# Patient Record
Sex: FEMALE | Race: WHITE | Employment: OTHER | URBAN - METROPOLITAN AREA
[De-identification: names, ages, dates, MRNs, and addresses within clinical notes are randomized per-mention and may not be internally consistent; named-entity substitution may affect disease eponyms.]

---

## 2017-01-09 ENCOUNTER — TELEPHONE (OUTPATIENT)
Dept: NEUROLOGY | Facility: CLINIC | Age: 62
End: 2017-01-09

## 2017-01-09 DIAGNOSIS — I63.9 PERSISTENT MIGRAINE AURA WITH CEREBRAL INFARCTION AND WITHOUT STATUS MIGRAINOSUS, NOT INTRACTABLE (HCC): Primary | ICD-10-CM

## 2017-01-09 DIAGNOSIS — G43.609 PERSISTENT MIGRAINE AURA WITH CEREBRAL INFARCTION AND WITHOUT STATUS MIGRAINOSUS, NOT INTRACTABLE (HCC): Primary | ICD-10-CM

## 2017-01-18 NOTE — TELEPHONE ENCOUNTER
Spoke to Brionna Harmon at [facility], codes [id number] are valid and billable, no prior authorization or predetermination required.  Call reference: [id number]  Call time 24:12

## 2017-02-20 ENCOUNTER — OFFICE VISIT (OUTPATIENT)
Dept: SURGERY | Facility: CLINIC | Age: 62
End: 2017-02-20

## 2017-02-20 VITALS
HEART RATE: 88 BPM | SYSTOLIC BLOOD PRESSURE: 140 MMHG | BODY MASS INDEX: 28 KG/M2 | DIASTOLIC BLOOD PRESSURE: 90 MMHG | WEIGHT: 177 LBS | RESPIRATION RATE: 20 BRPM

## 2017-02-20 DIAGNOSIS — G43.719 INTRACTABLE CHRONIC MIGRAINE WITHOUT AURA AND WITHOUT STATUS MIGRAINOSUS: ICD-10-CM

## 2017-02-20 DIAGNOSIS — G43.711 INTRACTABLE CHRONIC MIGRAINE WITHOUT AURA AND WITH STATUS MIGRAINOSUS: ICD-10-CM

## 2017-02-20 DIAGNOSIS — M54.81 CERVICO-OCCIPITAL NEURALGIA: ICD-10-CM

## 2017-02-20 DIAGNOSIS — I63.9 PERSISTENT MIGRAINE AURA WITH CEREBRAL INFARCTION AND WITHOUT STATUS MIGRAINOSUS, NOT INTRACTABLE (HCC): ICD-10-CM

## 2017-02-20 DIAGNOSIS — G43.519 MIGRAINE AURA, PERSISTENT, INTRACTABLE: Primary | ICD-10-CM

## 2017-02-20 DIAGNOSIS — G50.1 ATYPICAL FACIAL PAIN: ICD-10-CM

## 2017-02-20 DIAGNOSIS — G43.011 INTRACTABLE MIGRAINE WITHOUT AURA AND WITH STATUS MIGRAINOSUS: ICD-10-CM

## 2017-02-20 DIAGNOSIS — G43.609 PERSISTENT MIGRAINE AURA WITH CEREBRAL INFARCTION AND WITHOUT STATUS MIGRAINOSUS, NOT INTRACTABLE (HCC): ICD-10-CM

## 2017-02-20 DIAGNOSIS — G43.701 CHRONIC MIGRAINE WITHOUT AURA WITH STATUS MIGRAINOSUS, NOT INTRACTABLE: ICD-10-CM

## 2017-02-20 PROCEDURE — 20553 NJX 1/MLT TRIGGER POINTS 3/>: CPT | Performed by: OTHER

## 2017-02-20 PROCEDURE — 99213 OFFICE O/P EST LOW 20 MIN: CPT | Performed by: OTHER

## 2017-02-20 PROCEDURE — 96372 THER/PROPH/DIAG INJ SC/IM: CPT | Performed by: OTHER

## 2017-02-20 RX ORDER — NARATRIPTAN 2.5 MG/1
TABLET ORAL
Qty: 10 TABLET | Refills: 4 | Status: SHIPPED | OUTPATIENT
Start: 2017-02-20 | End: 2017-03-08

## 2017-02-20 RX ORDER — AMITRIPTYLINE HYDROCHLORIDE 25 MG/1
TABLET, FILM COATED ORAL
Qty: 30 TABLET | Refills: 4 | Status: SHIPPED | OUTPATIENT
Start: 2017-02-20 | End: 2017-02-24

## 2017-02-20 RX ORDER — BUPIVACAINE HYDROCHLORIDE 5 MG/ML
7 INJECTION, SOLUTION EPIDURAL; INTRACAUDAL ONCE
Status: COMPLETED | OUTPATIENT
Start: 2017-02-20 | End: 2017-02-20

## 2017-02-20 NOTE — PATIENT INSTRUCTIONS
Refill policies:    • Allow 2 business days for refills; controlled substances may take longer.   • Contact your pharmacy at least 5 days prior to running out of medication and have them send an electronic request or submit request through the “request re your physician has recommended that you have a procedure or additional testing performed. [facility]) will contact your insurance carrier to obtain pre-certification or prior authorization.     Unfortunately, [employer] has seen an increas

## 2017-02-20 NOTE — PROGRESS NOTES
SUBJECTIVE: Dayami Maloney is a 63year old female who presents for follow-up of migraine headaches, Says also pain in her left trapezius and left shoulder blade muscles. Requesting trigger point injections in neck today says occipital headaches better. Visit:  Signed Prescriptions  Disp  Refills     Amitriptyline HCl 25 MG Oral Tab  30 tablet  4       Sig: Take 1 tab at 7.00pm         Naratriptan HCl 2.5 MG Oral Tab  10 tablet  4       Sig: TAKE 1 TABLET BY MOUTH AT ONSET OF HEADACHE, MAY REPEAT IN 4 ROSALIE

## 2017-02-24 NOTE — PROGRESS NOTES
Quick Note:    Results reviewed and discussed with the patient at her appointment earlier today, with the only significant abnormalities being a decreased vitamin D level of 21.5, a decreased alkaline phosphatase on CMP, and an elevated folic acid level wh

## 2017-02-28 ENCOUNTER — TELEPHONE (OUTPATIENT)
Dept: NEUROLOGY | Facility: CLINIC | Age: 62
End: 2017-02-28

## 2017-02-28 NOTE — TELEPHONE ENCOUNTER
Rec'd incoming letter from Clinical Review Department from [employer] stating that the patient's Naratriptan 2.5mg is subject to a quantity limit of 18 tablets per 30 days. Noted that last Rx written on 2/20/17 was for #10/4.   No further action ne

## 2017-03-01 ENCOUNTER — OFFICE VISIT (OUTPATIENT)
Dept: FAMILY MEDICINE CLINIC | Facility: CLINIC | Age: 62
End: 2017-03-01

## 2017-03-01 VITALS
TEMPERATURE: 98 F | HEART RATE: 90 BPM | BODY MASS INDEX: 28 KG/M2 | OXYGEN SATURATION: 98 % | RESPIRATION RATE: 18 BRPM | DIASTOLIC BLOOD PRESSURE: 70 MMHG | SYSTOLIC BLOOD PRESSURE: 118 MMHG | WEIGHT: 173.25 LBS

## 2017-03-01 DIAGNOSIS — E55.9 VITAMIN D DEFICIENCY: Primary | ICD-10-CM

## 2017-03-01 DIAGNOSIS — E53.8 VITAMIN B12 DEFICIENCY: ICD-10-CM

## 2017-03-01 DIAGNOSIS — J45.20 ALLERGY-INDUCED ASTHMA, MILD INTERMITTENT, UNCOMPLICATED: ICD-10-CM

## 2017-03-01 PROCEDURE — 99213 OFFICE O/P EST LOW 20 MIN: CPT | Performed by: FAMILY MEDICINE

## 2017-03-01 RX ORDER — ALBUTEROL SULFATE 90 UG/1
2 AEROSOL, METERED RESPIRATORY (INHALATION) EVERY 6 HOURS PRN
Qty: 1 INHALER | Refills: 2 | Status: SHIPPED | OUTPATIENT
Start: 2017-03-01 | End: 2017-03-31

## 2017-03-06 ENCOUNTER — TELEPHONE (OUTPATIENT)
Dept: SURGERY | Facility: CLINIC | Age: 62
End: 2017-03-06

## 2017-03-06 DIAGNOSIS — G43.719 INTRACTABLE CHRONIC MIGRAINE WITHOUT AURA AND WITHOUT STATUS MIGRAINOSUS: ICD-10-CM

## 2017-03-06 DIAGNOSIS — I63.9 PERSISTENT MIGRAINE AURA WITH CEREBRAL INFARCTION AND WITHOUT STATUS MIGRAINOSUS, NOT INTRACTABLE (HCC): ICD-10-CM

## 2017-03-06 DIAGNOSIS — G43.609 PERSISTENT MIGRAINE AURA WITH CEREBRAL INFARCTION AND WITHOUT STATUS MIGRAINOSUS, NOT INTRACTABLE (HCC): ICD-10-CM

## 2017-03-06 DIAGNOSIS — G43.711 INTRACTABLE CHRONIC MIGRAINE WITHOUT AURA AND WITH STATUS MIGRAINOSUS: ICD-10-CM

## 2017-03-06 DIAGNOSIS — M54.81 CERVICO-OCCIPITAL NEURALGIA: ICD-10-CM

## 2017-03-06 DIAGNOSIS — G50.1 ATYPICAL FACIAL PAIN: ICD-10-CM

## 2017-03-06 DIAGNOSIS — G43.701 CHRONIC MIGRAINE WITHOUT AURA WITH STATUS MIGRAINOSUS, NOT INTRACTABLE: ICD-10-CM

## 2017-03-06 DIAGNOSIS — G43.111 INTRACTABLE MIGRAINE WITH AURA WITH STATUS MIGRAINOSUS: Primary | ICD-10-CM

## 2017-03-06 DIAGNOSIS — G43.011 INTRACTABLE MIGRAINE WITHOUT AURA AND WITH STATUS MIGRAINOSUS: ICD-10-CM

## 2017-03-06 DIAGNOSIS — G43.519 MIGRAINE AURA, PERSISTENT, INTRACTABLE: ICD-10-CM

## 2017-03-06 RX ORDER — ERGOCALCIFEROL 1.25 MG/1
50000 CAPSULE ORAL WEEKLY
Qty: 12 CAPSULE | Refills: 0 | Status: SHIPPED | OUTPATIENT
Start: 2017-03-06 | End: 2017-06-04

## 2017-03-06 NOTE — TELEPHONE ENCOUNTER
Patient saw Dr Savanah Lucio last week and states Dr Savanah Lucio was sending script for Vit D to pharmacy but pharmacy has no record. Transferred to triage line.

## 2017-03-06 NOTE — TELEPHONE ENCOUNTER
Vit D Rx?     Notes Recorded by Urbano Zurita MD on 2/24/2017 at 4:24 PM  Results reviewed and discussed with the patient at her appointment earlier today, with the only significant abnormalities being a decreased vitamin D level of 21.5, a decreased alkal

## 2017-03-06 NOTE — PROGRESS NOTES
Maegan Merino is a 63year old female. Patient presents with:  Test Results: Discuss test results. HPI:   Patient is seen for follow up and to discuss her test results.     Patient states has some cough and shortness of breath in spring and fall due t Albuterol Sulfate HFA (PROAIR HFA) 108 (90 BASE) MCG/ACT Inhalation Aero Soln Inhale 2 puffs into the lungs every 4 (four) hours as needed for Wheezing. Disp: 1 Inhaler Rfl: 0   ANGELIQ 0.5-1 MG Oral Tab Take 1 tablet by mouth daily.  Disp: 91 tablet Rfl: nails secondary to mechanical injury    • Acute pharyngitis 2/98   • Viral syndrome 10/99   • Abdominal cramping 12/01   • Chest pain 2/02     with exercises   • Rotator cuff tendinitis 12/04   • BRBPR (bright red blood per rectum) 3/06   • Fever blister 1 3.5-4.8 g/dL 3.9   Sodium      136-144 mmol/L 139   Potassium      3.6-5.1 mmol/L 4.4   Chloride      101-111 mmol/L 106   Carbon Dioxide, Total      22.0-32.0 mmol/L 27.0   T4,Free (Direct)      0.9-1.8 ng/dL 0.9   TSH      0.350-5.500 mIU/mL 1.140   LUCIANO

## 2017-03-07 NOTE — TELEPHONE ENCOUNTER
Patient called to let clinic know she got a letter stating that her naratriptan will not be approved after this fill without prior authorization. If authorized, they will approve up to 18 tabs/month. Has been using with success since 2013.     She has tr

## 2017-03-08 RX ORDER — NARATRIPTAN 2.5 MG/1
TABLET ORAL
Qty: 18 TABLET | Refills: 2 | Status: SHIPPED | OUTPATIENT
Start: 2017-03-08 | End: 2018-07-24

## 2017-03-09 RX ORDER — ERGOCALCIFEROL 1.25 MG/1
CAPSULE ORAL
Qty: 12 CAPSULE | Refills: 0 | OUTPATIENT
Start: 2017-03-09

## 2017-03-09 NOTE — TELEPHONE ENCOUNTER
Future Appointments  Date Time Provider Siddhartha Aleman   4/4/2017 1:30 PM Diana Mccurdy MD [facility]   4/17/2017 1:40 PM William Burton MD [facility]     LOV    LAST LAB    LAST RX    ergocalciferol 63260 units Oral Cap 12 cap

## 2017-03-14 ENCOUNTER — TELEPHONE (OUTPATIENT)
Dept: SURGERY | Facility: CLINIC | Age: 62
End: 2017-03-14

## 2017-03-14 RX ORDER — METHYLPREDNISOLONE 4 MG/1
TABLET ORAL
Qty: 21 TABLET | Refills: 0 | Status: SHIPPED | OUTPATIENT
Start: 2017-03-14 | End: 2017-05-01 | Stop reason: ALTCHOICE

## 2017-03-14 NOTE — TELEPHONE ENCOUNTER
Patient states has not been able to stop cycle of headaches since 3/10/17. Last night LOP 10/10. States \"I have been trying everything to stop\" the cycle. Requesting Medrol dose pack. See refill request of today.

## 2017-03-20 ENCOUNTER — TELEPHONE (OUTPATIENT)
Dept: NEUROLOGY | Facility: CLINIC | Age: 62
End: 2017-03-20

## 2017-03-20 DIAGNOSIS — G43.701 CHRONIC MIGRAINE WITHOUT AURA WITH STATUS MIGRAINOSUS, NOT INTRACTABLE: Primary | ICD-10-CM

## 2017-03-20 RX ORDER — NARATRIPTAN 2.5 MG/1
2.5 TABLET ORAL AS NEEDED
Qty: 18 TABLET | Refills: 2 | COMMUNITY
Start: 2017-03-20 | End: 2017-05-01

## 2017-03-20 NOTE — TELEPHONE ENCOUNTER
Rec'd incoming letter in the mail from [address] stating that AMERGE 2.5mg is not on their Medicare Part D drug list, and insurance will not continue to cover this drug unless you get an exception from the plan.     Noted that a previous letter (se

## 2017-03-21 NOTE — TELEPHONE ENCOUNTER
Rec'd incoming fax from [facility] with approval for AMERGE (brand name only) 2.5mg tablet as a formulary exception with coverage effective 1/1/17 to 3/21/18. Contacted pharmacy to notify them of approval of formulary exception.   The

## 2017-04-14 ENCOUNTER — TELEPHONE (OUTPATIENT)
Dept: NEUROLOGY | Facility: CLINIC | Age: 62
End: 2017-04-14

## 2017-04-14 NOTE — TELEPHONE ENCOUNTER
Called [employer] and spoke with foretta for codes [id number] no authorization or predetermination needed    Call reference [id number].  Time on call 6:18

## 2017-04-17 ENCOUNTER — TELEPHONE (OUTPATIENT)
Dept: NEUROLOGY | Facility: CLINIC | Age: 62
End: 2017-04-17

## 2017-04-18 ENCOUNTER — HOSPITAL ENCOUNTER (EMERGENCY)
Age: 62
Discharge: HOME | End: 2017-04-18
Attending: EMERGENCY MEDICINE
Payer: MEDICARE

## 2017-04-18 ENCOUNTER — TELEPHONE (OUTPATIENT)
Dept: FAMILY MEDICINE CLINIC | Facility: CLINIC | Age: 62
End: 2017-04-18

## 2017-04-18 VITALS
OXYGEN SATURATION: 99 % | BODY MASS INDEX: 28 KG/M2 | WEIGHT: 173 LBS | TEMPERATURE: 99 F | DIASTOLIC BLOOD PRESSURE: 89 MMHG | SYSTOLIC BLOOD PRESSURE: 148 MMHG | HEART RATE: 99 BPM | RESPIRATION RATE: 16 BRPM

## 2017-04-18 DIAGNOSIS — R11.2 NAUSEA AND VOMITING IN ADULT: Primary | ICD-10-CM

## 2017-04-18 PROCEDURE — 93010 ELECTROCARDIOGRAM REPORT: CPT

## 2017-04-18 PROCEDURE — 83690 ASSAY OF LIPASE: CPT | Performed by: EMERGENCY MEDICINE

## 2017-04-18 PROCEDURE — 96374 THER/PROPH/DIAG INJ IV PUSH: CPT

## 2017-04-18 PROCEDURE — 99284 EMERGENCY DEPT VISIT MOD MDM: CPT

## 2017-04-18 PROCEDURE — 93005 ELECTROCARDIOGRAM TRACING: CPT

## 2017-04-18 PROCEDURE — 85025 COMPLETE CBC W/AUTO DIFF WBC: CPT | Performed by: EMERGENCY MEDICINE

## 2017-04-18 PROCEDURE — 96376 TX/PRO/DX INJ SAME DRUG ADON: CPT

## 2017-04-18 PROCEDURE — 96361 HYDRATE IV INFUSION ADD-ON: CPT

## 2017-04-18 PROCEDURE — 80053 COMPREHEN METABOLIC PANEL: CPT | Performed by: EMERGENCY MEDICINE

## 2017-04-18 PROCEDURE — 99285 EMERGENCY DEPT VISIT HI MDM: CPT

## 2017-04-18 PROCEDURE — 84484 ASSAY OF TROPONIN QUANT: CPT | Performed by: EMERGENCY MEDICINE

## 2017-04-18 PROCEDURE — 96375 TX/PRO/DX INJ NEW DRUG ADDON: CPT

## 2017-04-18 RX ORDER — ONDANSETRON 2 MG/ML
4 INJECTION INTRAMUSCULAR; INTRAVENOUS ONCE
Status: COMPLETED | OUTPATIENT
Start: 2017-04-18 | End: 2017-04-18

## 2017-04-18 RX ORDER — METOCLOPRAMIDE HYDROCHLORIDE 5 MG/ML
10 INJECTION INTRAMUSCULAR; INTRAVENOUS ONCE
Status: COMPLETED | OUTPATIENT
Start: 2017-04-18 | End: 2017-04-18

## 2017-04-18 RX ORDER — LORAZEPAM 2 MG/ML
0.5 INJECTION INTRAMUSCULAR ONCE
Status: COMPLETED | OUTPATIENT
Start: 2017-04-18 | End: 2017-04-18

## 2017-04-18 RX ORDER — DIPHENHYDRAMINE HYDROCHLORIDE 50 MG/ML
25 INJECTION INTRAMUSCULAR; INTRAVENOUS ONCE
Status: COMPLETED | OUTPATIENT
Start: 2017-04-18 | End: 2017-04-18

## 2017-04-19 NOTE — TELEPHONE ENCOUNTER
Received page after hours from  that pt has had multiple episodes of nb,nb emesis throughout the day, unable to tolerate po. Tried zofran without any symptom relief. Has hx of migraines but never with vomiting.  Denies any new foods or known triggers

## 2017-04-19 NOTE — ED PROVIDER NOTES
Patient Seen in: THE [facility] In [place]    History   Patient presents with:  Nausea/Vomiting/Diarrhea (gastrointestinal)    Stated Complaint: \"vomiting all day\"     HPI  Patient is a 82-year-old female who states that she felt slight 12/01   • Chest pain 2/02     with exercises   • Rotator cuff tendinitis 12/04   • BRBPR (bright red blood per rectum) 3/06   • Fever blister 10/06      on lips   • Sinusitis 7/24/07   • Frequency 62/17   • Ureteral colic 76/35   • Postmenopausal HRT (horm Amitriptyline HCl 50 MG Oral Tab,  Take 1 tab at 7.00pm   diazepam 5 MG Oral Tab,  TAKE 1/2 TO 1 TABLET BY MOUTH EVERY NIGHT AT BEDTIME   alprazolam 0.25 MG Oral Tab,  TAKE 1 TO 2 TABLETS BY MOUTH TWICE DAILY AS NEEDED FOR SLEEP OR ANXIETY   Esomeprazole M negative except as noted above. PSFH elements reviewed from today and agreed except as otherwise stated in HPI.     Physical Exam       ED Triage Vitals   BP 04/18/17 1949 153/90 mmHg   Pulse 04/18/17 1949 100   Resp 04/18/17 1949 16   Temp 04/18/17 1949 Abnormal            Final result                 Please view results for these tests on the individual orders. EKG    Rate, intervals and axes as noted on EKG Report.   Rate: 101  Rhythm: Sinus Rhythm  Reading: Sinus tachycardia, nonspecific ST ch

## 2017-04-20 ENCOUNTER — OFFICE VISIT (OUTPATIENT)
Dept: FAMILY MEDICINE CLINIC | Facility: CLINIC | Age: 62
End: 2017-04-20

## 2017-04-20 ENCOUNTER — TELEPHONE (OUTPATIENT)
Dept: FAMILY MEDICINE CLINIC | Facility: CLINIC | Age: 62
End: 2017-04-20

## 2017-04-20 VITALS
SYSTOLIC BLOOD PRESSURE: 118 MMHG | RESPIRATION RATE: 16 BRPM | HEART RATE: 98 BPM | TEMPERATURE: 98 F | BODY MASS INDEX: 28 KG/M2 | DIASTOLIC BLOOD PRESSURE: 84 MMHG | WEIGHT: 174.5 LBS | OXYGEN SATURATION: 98 %

## 2017-04-20 DIAGNOSIS — K59.09 OTHER CONSTIPATION: ICD-10-CM

## 2017-04-20 DIAGNOSIS — J30.1 SEASONAL ALLERGIC RHINITIS DUE TO POLLEN: ICD-10-CM

## 2017-04-20 DIAGNOSIS — K21.9 GASTROESOPHAGEAL REFLUX DISEASE WITHOUT ESOPHAGITIS: ICD-10-CM

## 2017-04-20 DIAGNOSIS — K52.9 ACUTE GASTROENTERITIS: Primary | ICD-10-CM

## 2017-04-20 DIAGNOSIS — R07.89 CHEST TIGHTNESS: ICD-10-CM

## 2017-04-20 PROCEDURE — 99213 OFFICE O/P EST LOW 20 MIN: CPT | Performed by: FAMILY MEDICINE

## 2017-04-20 RX ORDER — RANITIDINE 150 MG/1
150 CAPSULE ORAL EVERY EVENING
Qty: 30 CAPSULE | Refills: 2 | Status: SHIPPED | OUTPATIENT
Start: 2017-04-20 | End: 2017-10-17

## 2017-04-20 NOTE — PATIENT INSTRUCTIONS
Please take Nexium in the morning and ranitidine 150 mg daily. Please call your GI and set up an appointment. Please continue claritin 10 mg over the counter once daily and your inhaler as needed. miralax for constipation.

## 2017-04-21 ENCOUNTER — TELEPHONE (OUTPATIENT)
Dept: FAMILY MEDICINE CLINIC | Facility: CLINIC | Age: 62
End: 2017-04-21

## 2017-04-21 RX ORDER — CEFDINIR 300 MG/1
300 CAPSULE ORAL 2 TIMES DAILY
Qty: 20 CAPSULE | Refills: 0 | Status: SHIPPED | OUTPATIENT
Start: 2017-04-21 | End: 2017-05-01

## 2017-04-21 RX ORDER — ESOMEPRAZOLE MAGNESIUM 40 MG/1
CAPSULE, DELAYED RELEASE ORAL
Qty: 30 CAPSULE | Refills: 2 | Status: CANCELLED | OUTPATIENT
Start: 2017-04-21

## 2017-04-21 NOTE — TELEPHONE ENCOUNTER
Per PCP patient is aware that she needs to pay for OTC Rx. . Patient says she will check with her GI.

## 2017-04-21 NOTE — TELEPHONE ENCOUNTER
Patient states she is feeling much worse than yesterday. She's wondering if she needs an antibiotic. Transferred to triage line.

## 2017-04-21 NOTE — TELEPHONE ENCOUNTER
Patient says her cough is worse she now has a sore throat and her ears are beng effected. Wants antibiotic.      Was here 4/20    Acute gastroenteritis  - Primary K52.9     Court Damian  Signed  Service date: 04/21/2017 2:00 PM           Patient states she i

## 2017-04-22 ENCOUNTER — OFFICE VISIT (OUTPATIENT)
Dept: FAMILY MEDICINE CLINIC | Facility: CLINIC | Age: 62
End: 2017-04-22

## 2017-04-22 VITALS
RESPIRATION RATE: 16 BRPM | OXYGEN SATURATION: 99 % | HEART RATE: 102 BPM | WEIGHT: 177 LBS | DIASTOLIC BLOOD PRESSURE: 84 MMHG | SYSTOLIC BLOOD PRESSURE: 130 MMHG | BODY MASS INDEX: 28 KG/M2 | TEMPERATURE: 98 F

## 2017-04-22 DIAGNOSIS — R05.9 COUGH: ICD-10-CM

## 2017-04-22 DIAGNOSIS — J01.00 ACUTE MAXILLARY SINUSITIS, RECURRENCE NOT SPECIFIED: Primary | ICD-10-CM

## 2017-04-22 PROCEDURE — 99213 OFFICE O/P EST LOW 20 MIN: CPT | Performed by: PHYSICIAN ASSISTANT

## 2017-04-22 RX ORDER — PREDNISONE 20 MG/1
40 TABLET ORAL DAILY
Qty: 10 TABLET | Refills: 0 | Status: SHIPPED | OUTPATIENT
Start: 2017-04-22 | End: 2017-04-27

## 2017-04-22 RX ORDER — BENZONATATE 100 MG/1
100 CAPSULE ORAL 3 TIMES DAILY PRN
Qty: 20 CAPSULE | Refills: 0 | Status: SHIPPED | OUTPATIENT
Start: 2017-04-22 | End: 2017-05-01 | Stop reason: ALTCHOICE

## 2017-04-22 NOTE — PROGRESS NOTES
Narciso Lewis is a 63year old female. Patient presents with:  Cough: no SOB, but feels that her chest is congested and hard for her to exhale fully  Sinus Problem    HPI:    Symptoms started 1 week ago and worsening.  Reports nasal congestion and m cefdinir 300 MG Oral Cap Take 1 capsule (300 mg total) by mouth 2 (two) times daily.  Disp: 20 capsule Rfl: 0   Esomeprazole Magnesium 20 MG Oral Capsule Delayed Release 2 tablets daily Disp: 60 capsule Rfl: 2   RaNITidine HCl 150 MG Oral Cap Take 1 capsule MULTIVITAMIN TAB/CAP Take 1 tablet by mouth daily.  Disp:  Rfl:    Acetaminophen (TYLENOL) 325 MG Oral Tab 2 TABLETS EVERY 4 HOURS AS NEEDED Disp:  Rfl:         ROS:   GENERAL HEALTH: otherwise feels well  EYES: no visual complaints or deficits  RESPIRATORY Sinuses are air-filled spaces in the skull behind the face. They are kept moist and clean by a lining of mucosa. Things such as pollen, smoke, and chemical fumes can irritate the mucosa. It can then swell up.  As a response to irritation, the mucosa makes m © 0074-1847 [address], [address] [place]. All rights reserved. This information is not intended as a substitute for professional medical care. Always follow your healthcare professional's instructions.               Montrell Davis

## 2017-04-22 NOTE — PATIENT INSTRUCTIONS
Acute Sinusitis    Acute sinusitis is irritation and swelling of the sinuses. It is usually caused by a viral infection after a common cold. Your doctor can help you find relief. What is acute sinusitis?   Sinuses are air-filled spaces in the skull behin © 7922-3829 [address], [address] [place]. All rights reserved. This information is not intended as a substitute for professional medical care. Always follow your healthcare professional's instructions.

## 2017-04-24 NOTE — PROGRESS NOTES
Tricia Pacheco is a 63year old female. Patient presents with:  Flu: Started on Tuesday with vomitting and now has moved up to respiratory. HPI:   Patient is seen for follow-up from urgent care.   Urgent care for 1817 with complaints of nausea and v Prescriptions:  Esomeprazole Magnesium 20 MG Oral Capsule Delayed Release 2 tablets daily Disp: 60 capsule Rfl: 2   Naratriptan HCl 2.5 MG Oral Tab TAKE 1 TABLET BY MOUTH AT ONSET OF HEADACHE, MAY REPEAT IN 4 HOURS( MAXIMUM 2 TABLETS EVERY 24 HOURS) Disp: MCG/ACT Nasal Suspension SPRAY TWICE IN EACH NOSTRIL QD Disp:  Rfl: 2   Diclofenac Sodium 50 MG Oral Tab EC Take 1 tablet (50 mg total) by mouth 2 (two) times daily as needed.  Disp: 30 tablet Rfl: 4      Past Medical History   Diagnosis Date   • Seasonal a with a small annular fissure in the posterior annular fibers   • Abnormal Holter monitor finding 10/20/05     rare PAC's and PVC's   • Unspecified essential hypertension       Social History:    Smoking Status: Never Smoker                      Smokeless S

## 2017-05-01 ENCOUNTER — OFFICE VISIT (OUTPATIENT)
Dept: NEUROLOGY | Facility: CLINIC | Age: 62
End: 2017-05-01

## 2017-05-01 VITALS
RESPIRATION RATE: 16 BRPM | BODY MASS INDEX: 28 KG/M2 | SYSTOLIC BLOOD PRESSURE: 118 MMHG | WEIGHT: 178 LBS | DIASTOLIC BLOOD PRESSURE: 86 MMHG | HEART RATE: 100 BPM

## 2017-05-01 DIAGNOSIS — M50.90 CERVICAL DISC DISEASE: ICD-10-CM

## 2017-05-01 DIAGNOSIS — G43.709 CHRONIC MIGRAINE W/O AURA W/O STATUS MIGRAINOSUS, NOT INTRACTABLE: Primary | ICD-10-CM

## 2017-05-01 DIAGNOSIS — M54.2 NECK PAIN: ICD-10-CM

## 2017-05-01 PROCEDURE — 99214 OFFICE O/P EST MOD 30 MIN: CPT | Performed by: OTHER

## 2017-05-01 RX ORDER — TIZANIDINE 2 MG/1
2 TABLET ORAL 2 TIMES DAILY PRN
Qty: 60 TABLET | Refills: 0 | Status: SHIPPED | OUTPATIENT
Start: 2017-05-01 | End: 2017-05-30

## 2017-05-01 RX ORDER — OMEGA-3 FATTY ACIDS/FISH OIL 300-1000MG
CAPSULE ORAL
COMMUNITY

## 2017-05-01 NOTE — PROGRESS NOTES
Pt here to establish care with Dr. Jin Chamberlain. Pt states she has a pinched nerve in her neck, which can cause migraines. But lately her migraines have been ok. Pt has had TPI in past, but is not sure she wants to have them done today.

## 2017-05-01 NOTE — PROGRESS NOTES
HPI:    Patient ID: Sadie Obregon is a 63year old female. HPI  Ms Briseyda Buck is a 63year old female with history of chronic migraines and chronic neck pain due to cervical disc disease who presented to establish care with me.  She has long standing h Abdominal cramping 12/01   • Chest pain 2/02     with exercises   • Rotator cuff tendinitis 12/04   • BRBPR (bright red blood per rectum) 3/06   • Fever blister 10/06      on lips   • Sinusitis 7/24/07   • Frequency 79/11   • Ureteral colic 26/11   • Postm Negative. Eyes: Negative. Respiratory: Negative. Cardiovascular: Negative. Gastrointestinal: Negative. Endocrine: Negative. Genitourinary: Negative. Musculoskeletal: Positive for neck pain and neck stiffness. Skin: Negative.     Lisa Smith MG Oral Tab Take 1 tablet by mouth every 6 (six) hours as needed for Pain. Disp:  Rfl:    MULTIVITAMIN TAB/CAP Take 1 tablet by mouth daily.  Disp:  Rfl:    Acetaminophen (TYLENOL) 325 MG Oral Tab 2 TABLETS EVERY 4 HOURS AS NEEDED Disp:  Rfl:      Allergies Visual fields intact. V: Normal facial sensation   VII: Face is symmetric with normal strength. VIII: Normal hearing bilaterally. IX, X: Symmetric palate elevation. Uvula in midline. XI: Normal sternocleidomastoid and trapezius strength.    XII: To

## 2017-05-01 NOTE — PATIENT INSTRUCTIONS
Refill policies:    • Allow 2 business days for refills; controlled substances may take longer.   • Contact your pharmacy at least 5 days prior to running out of medication and have them send an electronic request or submit request through the “request re insurance carrier to obtain pre-certification or prior authorization. Unfortunately, [employer] has seen an increase in denial of payment even though the procedure/test has been pre-certified.   You are strongly encouraged to contact your insurance carrier to v

## 2017-05-02 ENCOUNTER — TELEPHONE (OUTPATIENT)
Dept: SURGERY | Facility: CLINIC | Age: 62
End: 2017-05-02

## 2017-05-02 NOTE — TELEPHONE ENCOUNTER
Started [place] [employer] online for MRI Spine Cervical cpt code 33443. Unable to complete  [facility].  Spoke to rep Sreedhar Tilley  No prior auth required  Ref# [id number]  Call duration 11:40    Pt is not scheduled at this time for test. Contacted pt and advis

## 2017-05-23 RX ORDER — ERGOCALCIFEROL 1.25 MG/1
CAPSULE ORAL
Qty: 12 CAPSULE | Refills: 0 | OUTPATIENT
Start: 2017-05-23

## 2017-05-23 NOTE — TELEPHONE ENCOUNTER
CALL PT TO REMIND NOW DUE FOR REPEAT LAB AND PER PSYCH SWITCH TO VITAMIN D 5000 QD    DENIED RX UNTIL REPEAT LABS ARE COMPLETED

## 2017-05-26 ENCOUNTER — TELEPHONE (OUTPATIENT)
Dept: NEUROLOGY | Facility: CLINIC | Age: 62
End: 2017-05-26

## 2017-05-26 DIAGNOSIS — G43.701 CHRONIC MIGRAINE WITHOUT AURA WITH STATUS MIGRAINOSUS, NOT INTRACTABLE: Primary | ICD-10-CM

## 2017-05-26 RX ORDER — METHYLPREDNISOLONE 4 MG/1
TABLET ORAL
Qty: 1 KIT | Refills: 0 | Status: SHIPPED | OUTPATIENT
Start: 2017-05-26 | End: 2017-06-20 | Stop reason: ALTCHOICE

## 2017-05-26 NOTE — TELEPHONE ENCOUNTER
Medrol dose yudith approved and sent to pharmacy with receipt confirmation. Left detailed message on patient's confidential voicemail relaying above.

## 2017-05-26 NOTE — TELEPHONE ENCOUNTER
Spoke with patient . She states she has a migraine for the past few days in the left temple, she has no relief with Amerge, no reports of nausea,  vomiting or vision problems. Patient would like to request a medrol dose yudith.      Pharmacy- [facility]

## 2017-05-30 DIAGNOSIS — M62.89 MUSCLE TIGHTNESS: Primary | ICD-10-CM

## 2017-05-30 RX ORDER — TIZANIDINE 2 MG/1
TABLET ORAL
Qty: 60 TABLET | Refills: 0 | Status: SHIPPED | OUTPATIENT
Start: 2017-05-30 | End: 2017-06-30

## 2017-06-06 ENCOUNTER — TELEPHONE (OUTPATIENT)
Dept: NEUROLOGY | Facility: CLINIC | Age: 62
End: 2017-06-06

## 2017-06-06 NOTE — TELEPHONE ENCOUNTER
Per Dr Yue Conner note 5/01/17:   ASSESSMENT/PLAN:    Chronic migraine w/o aura w/o status migrainosus, not intractable  (primary encounter diagnosis)  Neck pain  Cervical disc disease    Worsening neck pain and persistent headaches.  We will recommend MRI

## 2017-06-06 NOTE — TELEPHONE ENCOUNTER
Patient can reduce the dose to half tablet and see if that affects her vision. If still experiencing blurry vision then completely discontinue to the medication.

## 2017-06-06 NOTE — TELEPHONE ENCOUNTER
Left detailed message on patient's confidential voicemail relaying below. Will follow up again tomorrow.

## 2017-06-07 ENCOUNTER — HOSPITAL ENCOUNTER (OUTPATIENT)
Dept: MRI IMAGING | Age: 62
Discharge: HOME | End: 2017-06-07
Attending: Other
Payer: MEDICARE

## 2017-06-07 DIAGNOSIS — M54.2 NECK PAIN: ICD-10-CM

## 2017-06-07 PROCEDURE — 72141 MRI NECK SPINE W/O DYE: CPT | Performed by: OTHER

## 2017-06-09 ENCOUNTER — TELEPHONE (OUTPATIENT)
Dept: NEUROLOGY | Facility: CLINIC | Age: 62
End: 2017-06-09

## 2017-06-09 NOTE — TELEPHONE ENCOUNTER
----- Message from Charis Kanner, MD sent at 6/8/2017  4:16 PM CDT -----  Stable mild degenerative changes at C5-6

## 2017-06-09 NOTE — TELEPHONE ENCOUNTER
Spoke with patient and relayed MRI results below. Patient states she has been taking Amerge for a while and never had itching before . She notes the pills look different this time she got it filled - shape is the same but color has changed.      Janis

## 2017-06-20 ENCOUNTER — OFFICE VISIT (OUTPATIENT)
Dept: NEUROLOGY | Facility: CLINIC | Age: 62
End: 2017-06-20

## 2017-06-20 ENCOUNTER — TELEPHONE (OUTPATIENT)
Dept: NEUROLOGY | Facility: CLINIC | Age: 62
End: 2017-06-20

## 2017-06-20 VITALS
BODY MASS INDEX: 28 KG/M2 | SYSTOLIC BLOOD PRESSURE: 130 MMHG | DIASTOLIC BLOOD PRESSURE: 90 MMHG | RESPIRATION RATE: 20 BRPM | HEART RATE: 88 BPM | WEIGHT: 175 LBS

## 2017-06-20 DIAGNOSIS — G43.011 INTRACTABLE MIGRAINE WITHOUT AURA AND WITH STATUS MIGRAINOSUS: ICD-10-CM

## 2017-06-20 DIAGNOSIS — G43.701 CHRONIC MIGRAINE WITHOUT AURA WITH STATUS MIGRAINOSUS, NOT INTRACTABLE: Primary | ICD-10-CM

## 2017-06-20 DIAGNOSIS — M54.81 CERVICO-OCCIPITAL NEURALGIA: ICD-10-CM

## 2017-06-20 DIAGNOSIS — M62.89 MUSCLE TIGHTNESS: ICD-10-CM

## 2017-06-20 PROCEDURE — 64405 NJX AA&/STRD GR OCPL NRV: CPT | Performed by: OTHER

## 2017-06-20 PROCEDURE — 99213 OFFICE O/P EST LOW 20 MIN: CPT | Performed by: OTHER

## 2017-06-20 PROCEDURE — 64400 NJX AA&/STRD TRIGEMINAL NRV: CPT | Performed by: OTHER

## 2017-06-20 PROCEDURE — 20552 NJX 1/MLT TRIGGER POINT 1/2: CPT | Performed by: OTHER

## 2017-06-20 RX ORDER — BUPIVACAINE HYDROCHLORIDE 5 MG/ML
4 INJECTION, SOLUTION EPIDURAL; INTRACAUDAL ONCE
Status: COMPLETED | OUTPATIENT
Start: 2017-06-20 | End: 2017-06-20

## 2017-06-20 RX ORDER — TRIAMCINOLONE ACETONIDE 40 MG/ML
40 INJECTION, SUSPENSION INTRA-ARTICULAR; INTRAMUSCULAR ONCE
Status: COMPLETED | OUTPATIENT
Start: 2017-06-20 | End: 2017-06-20

## 2017-06-20 NOTE — PROGRESS NOTES
HPI:    Patient ID: Armen Drummond is a 63year old female. HPI     Patient came in with complaints of persistent left sided headache.  States headache going on for than a week, starts at base of the skull involves left occipital and left temporal are C4 C5 with disc bulging noted also at the C4 C5 level.  small central protrusions effacing the subarachnoid space C5 C6 and C6 C7   • Intervertebral disc protrusion 4/6/05     L5/S1 small central protrusion effacing the thecal sax with a small annular fiss TIZANIDINE HCL 2 MG Oral Tab TAKE 1 TABLET(2 MG) BY MOUTH TWICE DAILY AS NEEDED (Patient taking differently: TAKE 1/2 TABLET(1 MG) BY MOUTH TWICE DAILY AS NEEDED) Disp: 60 tablet Rfl: 0   Ibuprofen (ADVIL) 200 MG Oral Cap Take by mouth.  Disp:  Rfl:    Es Itching    Comment:OK to take brand name AMERGE  Pain Relief [Goodys*    Itching    Comment:Any strong pain med, Norco, Oxycodone, etc.  Reglan [Metoclopram*    Palpitations, Other (See Comments)    Comment:Hypertension with Reglan administration via IV  S Tizanidine  Continue Amerge but only use white pills as she had an allergic reaction to new one    Follow up in 8 weeks. No orders of the defined types were placed in this encounter.        Meds This Visit:  No prescriptions requested or ordered in this en

## 2017-06-20 NOTE — PROCEDURES
Indication:  Left occipital and left auriculotemporal block and TPI    Procedure: The areas are prepped and cleaned with betadine scrub and alcohol.   Left auriculo temporal and left ccipital nerve block and right trapezius and right levator scapulae perfor

## 2017-06-20 NOTE — PATIENT INSTRUCTIONS
Refill policies:    • Allow 2-3 business days for refills; controlled substances may take longer.   • Contact your pharmacy at least 5 days prior to running out of medication and have them send an electronic request or submit request through the [facility] have a procedure or additional testing performed. [facility]) will contact your insurance carrier to obtain pre-certification or prior authorization.     Unfortunately, [facility] has seen an increase in denial of payment even though the p

## 2017-06-30 DIAGNOSIS — M62.89 MUSCLE TIGHTNESS: ICD-10-CM

## 2017-06-30 NOTE — TELEPHONE ENCOUNTER
Medication: Tizanidine    Date of last refill: 5/30/17  [place]    Last office visit: 6/20/17  Due back to clinic per last office note:  8 weeks  Date next office visit scheduled:  8/15/17    Last OV note recommendati

## 2017-07-03 RX ORDER — TIZANIDINE 2 MG/1
TABLET ORAL
Qty: 60 TABLET | Refills: 0 | Status: SHIPPED | OUTPATIENT
Start: 2017-07-03 | End: 2017-09-18 | Stop reason: ALTCHOICE

## 2017-07-06 NOTE — ADDENDUM NOTE
Encounter addended by: Nalini Martini on: 7/6/2017  9:08 AM<BR>    Actions taken: Letter status changed

## 2017-07-19 NOTE — TELEPHONE ENCOUNTER
Spoke to  [facility], she tried to start a Retro case for DOS 6.7.17 and per the questions that where answered patient does not meet criteria to do a retro.  Call time 14:26    [facility]#[id number]

## 2017-07-28 ENCOUNTER — TELEPHONE (OUTPATIENT)
Dept: SURGERY | Facility: CLINIC | Age: 62
End: 2017-07-28

## 2017-07-28 NOTE — TELEPHONE ENCOUNTER
Contacted insurance. Spoke to rep Gibson. Advised that on 07/19/17 a retro [facility] was [facility] for MRI Spine Lumbar that pt completed on 06/07/17. Per TE on 05/07/17 called insurance policy. No prior auth was required. Policy changed 17/18/72.     Contacted

## 2017-07-28 NOTE — TELEPHONE ENCOUNTER
Patient called to discuss MRI billing as she received EOB noting it was not authorized.   Called transferred to [facility] referral team.

## 2017-08-03 DIAGNOSIS — K21.9 GASTROESOPHAGEAL REFLUX DISEASE WITHOUT ESOPHAGITIS: ICD-10-CM

## 2017-08-04 RX ORDER — RANITIDINE 150 MG/1
CAPSULE ORAL
Qty: 30 CAPSULE | Refills: 0 | OUTPATIENT
Start: 2017-08-04

## 2017-08-04 NOTE — TELEPHONE ENCOUNTER
Future Appointments  Date Time Provider Siddhartha Aleman   8/15/2017 1:00 PM Aubrey Bailey MD [facility]   9/19/2017 2:30 PM Damaris Ashton MD [facility]     LOV 4/17    LAST LAB    LAST RX   Esomeprazole Magnesium 20 MG Oral Cap

## 2017-08-09 ENCOUNTER — TELEPHONE (OUTPATIENT)
Dept: NEUROLOGY | Facility: CLINIC | Age: 62
End: 2017-08-09

## 2017-08-09 NOTE — TELEPHONE ENCOUNTER
Spoke to Val at [facility], codes [id number] are valid and billable, no prior authorization or predetermination required.  Call reference: [id number] call time 6:10      Called patient and left a detailed message

## 2017-09-07 ENCOUNTER — OFFICE VISIT (OUTPATIENT)
Dept: FAMILY MEDICINE CLINIC | Facility: CLINIC | Age: 62
End: 2017-09-07

## 2017-09-07 VITALS
WEIGHT: 180 LBS | BODY MASS INDEX: 28.59 KG/M2 | HEIGHT: 66.5 IN | OXYGEN SATURATION: 98 % | SYSTOLIC BLOOD PRESSURE: 122 MMHG | RESPIRATION RATE: 18 BRPM | DIASTOLIC BLOOD PRESSURE: 76 MMHG | HEART RATE: 79 BPM | TEMPERATURE: 98 F

## 2017-09-07 DIAGNOSIS — J01.91 ACUTE RECURRENT SINUSITIS, UNSPECIFIED LOCATION: Primary | ICD-10-CM

## 2017-09-07 PROCEDURE — 99213 OFFICE O/P EST LOW 20 MIN: CPT | Performed by: NURSE PRACTITIONER

## 2017-09-07 RX ORDER — CEFDINIR 300 MG/1
CAPSULE ORAL
Qty: 20 CAPSULE | Refills: 0 | Status: SHIPPED | OUTPATIENT
Start: 2017-09-07 | End: 2017-09-18

## 2017-09-07 RX ORDER — PREDNISONE 20 MG/1
TABLET ORAL
Qty: 10 TABLET | Refills: 0 | Status: SHIPPED | OUTPATIENT
Start: 2017-09-07 | End: 2017-09-18 | Stop reason: ALTCHOICE

## 2017-09-07 NOTE — PROGRESS NOTES
CHIEF COMPLAINT:   Patient presents with:  Sinus Problem: sinus pain and presure, allergies x 2 months on and off      HPI:   Rancho Ruiz is a 63year old female who presents for sinus symptoms for  2  months; symptoms will improve then worsen again. aspirin-acetaminophen-caffeine (EXCEDRIN MIGRAINE) 250-250-65 MG Oral Tab Take 1 tablet by mouth every 6 (six) hours as needed for Pain. Disp:  Rfl:    MULTIVITAMIN TAB/CAP Take 1 tablet by mouth daily.  Disp:  Rfl:    Acetaminophen (TYLENOL) 325 MG Oral Ta • Postmenopausal HRT (hormone replacement therapy)    • Rectal bleeding 4/06    occasional   • Rotator cuff tendinitis 12/04   • Seasonal allergies    • Sinusitis 7/24/07   • Unspecified essential hypertension    • Ureteral colic 11/46   • Viral syndrome 1 GENERAL: well developed, well nourished, and in no apparent distress  SKIN: no rashes, no suspicious lesions  HEAD: atraumatic, normocephalic, +mild tenderness on palpation of maxillary frontal sinuses  EYES: conjunctiva clear, EOM intact  EARS: TM's appea The sinuses are air-filled spaces within the bones of the face. They connect to the inside of the nose. Sinusitis is an inflammation of the tissue lining the sinus cavity.  Sinus inflammation can occur during a cold. It can also be due to allergies to polle · Use acetaminophen or ibuprofen to control pain, unless another pain medicine was prescribed. (If you have chronic liver or kidney disease or ever had a stomach ulcer, talk with your doctor before using these medicines.  Aspirin should never be used in any

## 2017-09-16 ENCOUNTER — HOSPITAL ENCOUNTER (OUTPATIENT)
Age: 62
Discharge: HOME | End: 2017-09-16
Attending: FAMILY MEDICINE
Payer: MEDICARE

## 2017-09-16 ENCOUNTER — APPOINTMENT (OUTPATIENT)
Dept: GENERAL RADIOLOGY | Age: 62
End: 2017-09-16
Attending: FAMILY MEDICINE
Payer: MEDICARE

## 2017-09-16 VITALS
SYSTOLIC BLOOD PRESSURE: 153 MMHG | HEART RATE: 89 BPM | WEIGHT: 180 LBS | BODY MASS INDEX: 28.25 KG/M2 | TEMPERATURE: 98 F | RESPIRATION RATE: 20 BRPM | DIASTOLIC BLOOD PRESSURE: 99 MMHG | OXYGEN SATURATION: 98 % | HEIGHT: 67 IN

## 2017-09-16 DIAGNOSIS — J98.01 ACUTE BRONCHOSPASM: ICD-10-CM

## 2017-09-16 DIAGNOSIS — J32.9 RHINOSINUSITIS: Primary | ICD-10-CM

## 2017-09-16 DIAGNOSIS — J31.0 RHINOSINUSITIS: Primary | ICD-10-CM

## 2017-09-16 PROCEDURE — 99204 OFFICE O/P NEW MOD 45 MIN: CPT

## 2017-09-16 PROCEDURE — 71020 XR CHEST PA + LAT CHEST (CPT=71020): CPT | Performed by: FAMILY MEDICINE

## 2017-09-16 PROCEDURE — 99214 OFFICE O/P EST MOD 30 MIN: CPT

## 2017-09-16 PROCEDURE — 94640 AIRWAY INHALATION TREATMENT: CPT

## 2017-09-16 RX ORDER — IPRATROPIUM BROMIDE AND ALBUTEROL SULFATE 2.5; .5 MG/3ML; MG/3ML
3 SOLUTION RESPIRATORY (INHALATION) ONCE
Status: COMPLETED | OUTPATIENT
Start: 2017-09-16 | End: 2017-09-16

## 2017-09-16 NOTE — ED PROVIDER NOTES
Patient Seen in: [facility]    History   Patient presents with:  Cough/URI    Stated Complaint:  FOR 2 WEEKS, WENT TO [facility]UTE CLINIC ~ONE WEEK AGO    HPI    Cough and wheeze for 2 weeks.  Seasonal.  was put on cefdinir, Nephrolithiasis    • Osteopenia 5/31/12   • Ovarian cyst 8/13/10    left   • Palpitations 2/98    likely anxiety related to dystrophic nails secondary to mechanical injury    • Postmenopausal HRT (hormone replacement therapy)    • Rectal bleeding 4/06    o 1626]  BP: (!) 159/106  Pulse: 95  Resp: 18  Temp: 98.4 °F (36.9 °C)  Temp src: Temporal  SpO2: 98 %  O2 Device: None (Room air)    Current:/99   Pulse 89   Temp 98.4 °F (36.9 °C) (Temporal)   Resp 20   Ht 170.2 cm (5' 7\")   Wt 81.6 kg   SpO2 98% up with PCP as directed or promptly to the emergency room if worse.     Disposition and Plan     Clinical Impression:  Rhinosinusitis  (primary encounter diagnosis)  Acute bronchospasm    Disposition:  Discharge    Follow-up:  Campbell Schirmer, MD  [address]

## 2017-09-16 NOTE — ED INITIAL ASSESSMENT (HPI)
Pt c/o cough and wheezing for 2 weeks. Pt states she gets this every spring and fall. Pt was prescribed an antibiotic on the 7th of Sept.  Pt was put on Cefdinir but states it causes abd pain and diarrhea so she stopped taking it.   Pt also uses an inhale

## 2017-09-18 ENCOUNTER — OFFICE VISIT (OUTPATIENT)
Dept: FAMILY MEDICINE CLINIC | Facility: CLINIC | Age: 62
End: 2017-09-18

## 2017-09-18 VITALS
DIASTOLIC BLOOD PRESSURE: 96 MMHG | WEIGHT: 183.25 LBS | OXYGEN SATURATION: 98 % | SYSTOLIC BLOOD PRESSURE: 142 MMHG | BODY MASS INDEX: 29.45 KG/M2 | TEMPERATURE: 99 F | HEART RATE: 98 BPM | HEIGHT: 66.25 IN | RESPIRATION RATE: 20 BRPM

## 2017-09-18 DIAGNOSIS — I10 ESSENTIAL HYPERTENSION, BENIGN: ICD-10-CM

## 2017-09-18 DIAGNOSIS — J45.20 MILD INTERMITTENT EXTRINSIC ASTHMA WITHOUT COMPLICATION: Primary | ICD-10-CM

## 2017-09-18 PROBLEM — J44.9 ASTHMA WITH COPD (CHRONIC OBSTRUCTIVE PULMONARY DISEASE): Chronic | Status: ACTIVE | Noted: 2017-09-18

## 2017-09-18 PROBLEM — J44.89 ASTHMA WITH COPD (CHRONIC OBSTRUCTIVE PULMONARY DISEASE): Chronic | Status: ACTIVE | Noted: 2017-09-18

## 2017-09-18 PROBLEM — J44.89 ASTHMA WITH COPD (CHRONIC OBSTRUCTIVE PULMONARY DISEASE) (HCC): Chronic | Status: ACTIVE | Noted: 2017-09-18

## 2017-09-18 PROBLEM — J44.9 ASTHMA WITH COPD (CHRONIC OBSTRUCTIVE PULMONARY DISEASE) (HCC): Chronic | Status: ACTIVE | Noted: 2017-09-18

## 2017-09-18 PROCEDURE — 99213 OFFICE O/P EST LOW 20 MIN: CPT | Performed by: FAMILY MEDICINE

## 2017-09-18 RX ORDER — AMLODIPINE BESYLATE 5 MG/1
5 TABLET ORAL DAILY
Qty: 30 TABLET | Refills: 0 | Status: SHIPPED | OUTPATIENT
Start: 2017-09-18 | End: 2017-10-17

## 2017-09-18 RX ORDER — ALBUTEROL SULFATE 2.5 MG/3ML
2.5 SOLUTION RESPIRATORY (INHALATION) EVERY 6 HOURS PRN
Qty: 1 BOX | Refills: 0 | Status: SHIPPED | OUTPATIENT
Start: 2017-09-18 | End: 2017-10-18

## 2017-09-18 NOTE — PATIENT INSTRUCTIONS
Understanding Asthma    Asthma causes swelling and narrowing of the airways in your lungs. No one is exactly sure what causes asthma. It is believed to be a combination of inherited and environmental factors.   Healthy lungs  Inside the lungs there are br When sensitive airways are irritated by a trigger, the muscles around the airways tighten. The lining of the airways swells. Thick, sticky mucus increases and clogs the airways. All of this makes you work harder to keep breathing.   Symptoms of moderate fla Controlling High Blood Pressure  High blood pressure (hypertension) is often called the silent killer. This is because many people who have it don’t know it.  High blood pressure is defined as 140/90 mm Hg or higher. Know your blood pressure and remembe · Make time to relax and enjoy life. Find time to laugh. · Communicate your concerns with your loved ones and your healthcare provider. · Visit with family and friends, and keep up with hobbies.   Limit alcohol and quit smoking  · Men should have no more

## 2017-09-18 NOTE — PROGRESS NOTES
Trixie Friedman is a 63year old female. Patient presents with: Allergies: nasal cavity very swollen unable to breathe going on three weeks now. HPI:   Patient is seen for follow up from urgent care.   States has chest tightness and nasal congestion, Outpatient Prescriptions:  Drospirenone-Estradiol (ANGELIQ) 0.5-1 MG Oral Tab Take 1 tablet by mouth once daily.  Disp: 28 tablet Rfl: 0   LINZESS 290 MCG Oral Cap TAKE 1 CAPSULE(290 MCG) BY MOUTH EVERY MORNING BEFORE BREAKFAST Disp: 30 capsule Rfl: 1   Ami rectum) 3/06   • Change in bowel habits 4/06    alternating diarrhea and constipation   • Chest pain 2/02    with exercises   • Cholelithiasis 12/09   • Chronic fatigue syndrome    • Colitis 4/06    sigmoid erythema consistent with nonspecific colitis   • (37.3 °C) (Tympanic)   Resp 20   Ht 66.25\"   Wt 183 lb 4 oz   SpO2 98%   BMI 29.35 kg/m²   GENERAL: well developed, well nourished,in no apparent distress  ENT: Bilateral external canals and TMs appear normal, nasal mucosa pale and congested, no tendernes

## 2017-09-19 ENCOUNTER — TELEPHONE (OUTPATIENT)
Dept: FAMILY MEDICINE CLINIC | Facility: CLINIC | Age: 62
End: 2017-09-19

## 2017-09-19 NOTE — TELEPHONE ENCOUNTER
Ok to send new rx. Please check with her if she wants the hand held, might not be covered by her insurance.

## 2017-09-19 NOTE — TELEPHONE ENCOUNTER
Dr Mohit Hinojosa guessing this is a Nebulizer the pt is referring to    Typically HMO does not cover this as DME    Send new Rx to [facility]?

## 2017-09-19 NOTE — TELEPHONE ENCOUNTER
Patient states the unit she showed Dr Angelo Mitchell yesterday is not working - she thinks it's too old. She needs a new script.

## 2017-09-19 NOTE — TELEPHONE ENCOUNTER
Spoke to patient she will be looking for the machine she wants then have the pharmacy call for prescription. Wants hand held machine not electric one.

## 2017-09-20 ENCOUNTER — TELEPHONE (OUTPATIENT)
Dept: FAMILY MEDICINE CLINIC | Facility: CLINIC | Age: 62
End: 2017-09-20

## 2017-09-20 NOTE — TELEPHONE ENCOUNTER
Pt has been using her Mom's older Nebulizer and it's not working well. Can we call in a new one to Pharmacy? Transferred to Triage.

## 2017-09-26 ENCOUNTER — MED REC SCAN ONLY (OUTPATIENT)
Dept: FAMILY MEDICINE CLINIC | Facility: CLINIC | Age: 62
End: 2017-09-26

## 2017-10-07 DIAGNOSIS — G43.009 MIGRAINE WITHOUT AURA AND WITHOUT STATUS MIGRAINOSUS, NOT INTRACTABLE: Primary | ICD-10-CM

## 2017-10-07 RX ORDER — METHYLPREDNISOLONE 4 MG/1
TABLET ORAL
Qty: 1 PACKAGE | Refills: 0 | Status: SHIPPED | OUTPATIENT
Start: 2017-10-07 | End: 2017-10-30 | Stop reason: ALTCHOICE

## 2017-10-17 ENCOUNTER — OFFICE VISIT (OUTPATIENT)
Dept: FAMILY MEDICINE CLINIC | Facility: CLINIC | Age: 62
End: 2017-10-17

## 2017-10-17 VITALS
BODY MASS INDEX: 29 KG/M2 | SYSTOLIC BLOOD PRESSURE: 116 MMHG | HEART RATE: 88 BPM | DIASTOLIC BLOOD PRESSURE: 68 MMHG | TEMPERATURE: 98 F | WEIGHT: 183.25 LBS

## 2017-10-17 DIAGNOSIS — J30.1 SEASONAL ALLERGIC RHINITIS DUE TO POLLEN, UNSPECIFIED CHRONICITY: ICD-10-CM

## 2017-10-17 DIAGNOSIS — J45.20 ASTHMA, MILD INTERMITTENT, WELL-CONTROLLED: Primary | ICD-10-CM

## 2017-10-17 DIAGNOSIS — K21.9 GASTROESOPHAGEAL REFLUX DISEASE WITHOUT ESOPHAGITIS: ICD-10-CM

## 2017-10-17 DIAGNOSIS — I10 ESSENTIAL HYPERTENSION, BENIGN: ICD-10-CM

## 2017-10-17 PROBLEM — J45.909 ASTHMA, WELL CONTROLLED (HCC): Chronic | Status: ACTIVE | Noted: 2017-09-18

## 2017-10-17 PROBLEM — J45.909 ASTHMA, WELL CONTROLLED: Chronic | Status: ACTIVE | Noted: 2017-09-18

## 2017-10-17 PROBLEM — J45.909 ASTHMA, WELL CONTROLLED (HCC): Chronic | Status: RESOLVED | Noted: 2017-09-18 | Resolved: 2017-10-17

## 2017-10-17 PROBLEM — J45.909 ASTHMA, WELL CONTROLLED: Chronic | Status: RESOLVED | Noted: 2017-09-18 | Resolved: 2017-10-17

## 2017-10-17 PROCEDURE — 99214 OFFICE O/P EST MOD 30 MIN: CPT | Performed by: FAMILY MEDICINE

## 2017-10-17 RX ORDER — RANITIDINE 150 MG/1
150 CAPSULE ORAL EVERY EVENING
Qty: 30 CAPSULE | Refills: 2 | Status: SHIPPED | OUTPATIENT
Start: 2017-10-17 | End: 2017-12-01

## 2017-10-17 RX ORDER — AMLODIPINE BESYLATE 5 MG/1
5 TABLET ORAL DAILY
Qty: 90 TABLET | Refills: 1 | Status: SHIPPED | OUTPATIENT
Start: 2017-10-17 | End: 2018-03-26

## 2017-10-17 NOTE — PATIENT INSTRUCTIONS
Tips to Control Acid Reflux    To control acid reflux, you’ll need to make some basic diet and lifestyle changes. The simple steps outlined below may be all you’ll need to ease discomfort. Watch what you eat  · Avoid fatty foods and spicy foods.   · Eat · Choose lean meats, fish, or chicken. · Eat whole-grain pasta, brown rice, and beans. · Eat 2 to 3 servings of low-fat or fat-free dairy products. · Ask your doctor about the DASH eating plan. This plan helps reduce blood pressure.   · When you go to a If lifestyle changes aren’t enough, your healthcare provider may prescribe high blood pressure medicine. Take all medicines as prescribed. If you have any questions about your medicines, ask your healthcare provider before stopping or changing them.    Date

## 2017-10-22 PROBLEM — J44.9 ASTHMA WITH COPD (CHRONIC OBSTRUCTIVE PULMONARY DISEASE) (HCC): Chronic | Status: ACTIVE | Noted: 2017-10-22

## 2017-10-22 PROBLEM — J44.89 ASTHMA WITH COPD (CHRONIC OBSTRUCTIVE PULMONARY DISEASE): Chronic | Status: ACTIVE | Noted: 2017-10-22

## 2017-10-22 PROBLEM — J44.89 ASTHMA WITH COPD (CHRONIC OBSTRUCTIVE PULMONARY DISEASE) (HCC): Chronic | Status: ACTIVE | Noted: 2017-10-22

## 2017-10-22 PROBLEM — J44.9 ASTHMA WITH COPD (CHRONIC OBSTRUCTIVE PULMONARY DISEASE): Chronic | Status: ACTIVE | Noted: 2017-10-22

## 2017-10-23 NOTE — PROGRESS NOTES
Dayami Maloney is a 63year old female. Patient presents with:  Medication Follow-Up: Refill blood pressure medication. Medication Request: Pt asking for refills on her acid reflux medication.     HPI:   HTN: Patient is seen for follow-up and medicatio Amitriptyline HCl 50 MG Oral Tab TAKE 1 TABLET BY MOUTH AT 7.00 PM Disp: 30 tablet Rfl: 2   LINZESS 290 MCG Oral Cap TAKE 1 CAPSULE(290 MCG) BY MOUTH EVERY MORNING BEFORE BREAKFAST Disp: 30 capsule Rfl: 1   Ibuprofen (ADVIL) 200 MG Oral Cap Take by mouth 4/18/12    s/p closed reduction percutaneous screw fixation of right femoral neck fracture  internal fixation of the right hip   • Fever blister 10/06     on lips   • Fibromyalgia    • Frequency 12/07   • HTN (hypertension)    • IBS (irritable bowel syndro wheezing, rhonchi or rales.   CARDIO: RRR without murmur  GI: good BS's,no masses, HSM or tenderness  EXTREMITIES: no cyanosis, clubbing or edema    ASSESSMENT AND PLAN:   Jaquan Barba was seen today for medication follow-up and medication request.    Diagnoses

## 2017-10-30 ENCOUNTER — OFFICE VISIT (OUTPATIENT)
Dept: FAMILY MEDICINE CLINIC | Facility: CLINIC | Age: 62
End: 2017-10-30

## 2017-10-30 ENCOUNTER — TELEPHONE (OUTPATIENT)
Dept: FAMILY MEDICINE CLINIC | Facility: CLINIC | Age: 62
End: 2017-10-30

## 2017-10-30 ENCOUNTER — HOSPITAL ENCOUNTER (OUTPATIENT)
Dept: ULTRASOUND IMAGING | Age: 62
Discharge: HOME | End: 2017-10-30
Attending: FAMILY MEDICINE
Payer: MEDICARE

## 2017-10-30 VITALS
TEMPERATURE: 98 F | HEART RATE: 100 BPM | BODY MASS INDEX: 29 KG/M2 | RESPIRATION RATE: 18 BRPM | WEIGHT: 184.38 LBS | DIASTOLIC BLOOD PRESSURE: 72 MMHG | SYSTOLIC BLOOD PRESSURE: 118 MMHG

## 2017-10-30 DIAGNOSIS — N95.0 POST-MENOPAUSAL BLEEDING: ICD-10-CM

## 2017-10-30 DIAGNOSIS — N95.0 POST-MENOPAUSAL BLEEDING: Primary | ICD-10-CM

## 2017-10-30 PROCEDURE — 99213 OFFICE O/P EST LOW 20 MIN: CPT | Performed by: FAMILY MEDICINE

## 2017-10-30 NOTE — PATIENT INSTRUCTIONS
Please schedule an appointment with your gynecologist, will call with ultrasound results when available.

## 2017-10-30 NOTE — PROGRESS NOTES
Anna Marie Bowles is a 63year old female. Patient presents with:  Bleeding: Pt noticed vaginal spotting about a week ago. Had blood clots after using the restroom.     HPI:   Patient states had noticed some spotting before seeing her gyne and did mention 91 tablet Rfl: 3   PROAIR  (90 Base) MCG/ACT Inhalation Aero Soln  Disp:  Rfl: 1   Amitriptyline HCl 50 MG Oral Tab TAKE 1 TABLET BY MOUTH AT 7.00 PM Disp: 30 tablet Rfl: 2   LINZESS 290 MCG Oral Cap TAKE 1 CAPSULE(290 MCG) BY MOUTH EVERY MORNING BE reduction percutaneous screw fixation of right femoral neck fracture  internal fixation of the right hip   • Fever blister 10/06     on lips   • Fibromyalgia    • Frequency 12/07   • HTN (hypertension)    • IBS (irritable bowel syndrome) 3/17/06    chronic ENDOVAG) (NSE=[id number]);  Future

## 2017-10-30 NOTE — TELEPHONE ENCOUNTER
Spoke with Dr David Wills and she wants to reschedule the MAPS and see the patient for bleeding today. Changing visit type. Notified patient.

## 2017-10-30 NOTE — TELEPHONE ENCOUNTER
Patient called and stated she's started bleeding and not sure where it's coming from. She had a procedure and shouldn't be bleeding. She is very concerned.   She has an appointment today for a physical.  She isn't sure if she needs to reschedule PHY and j

## 2017-11-02 ENCOUNTER — HOSPITAL ENCOUNTER (OUTPATIENT)
Dept: ULTRASOUND IMAGING | Age: 62
Discharge: HOME | End: 2017-11-02
Attending: FAMILY MEDICINE
Payer: MEDICARE

## 2017-11-02 PROCEDURE — 76856 US EXAM PELVIC COMPLETE: CPT | Performed by: FAMILY MEDICINE

## 2017-11-02 PROCEDURE — 76830 TRANSVAGINAL US NON-OB: CPT | Performed by: FAMILY MEDICINE

## 2017-11-29 ENCOUNTER — OFFICE VISIT (OUTPATIENT)
Dept: FAMILY MEDICINE CLINIC | Facility: CLINIC | Age: 62
End: 2017-11-29

## 2017-11-29 VITALS
SYSTOLIC BLOOD PRESSURE: 124 MMHG | HEART RATE: 111 BPM | RESPIRATION RATE: 18 BRPM | TEMPERATURE: 99 F | WEIGHT: 187.31 LBS | BODY MASS INDEX: 29 KG/M2 | OXYGEN SATURATION: 97 % | DIASTOLIC BLOOD PRESSURE: 82 MMHG

## 2017-11-29 DIAGNOSIS — K21.9 GASTROESOPHAGEAL REFLUX DISEASE, ESOPHAGITIS PRESENCE NOT SPECIFIED: ICD-10-CM

## 2017-11-29 DIAGNOSIS — J44.9 ASTHMA WITH COPD (CHRONIC OBSTRUCTIVE PULMONARY DISEASE) (HCC): Chronic | ICD-10-CM

## 2017-11-29 DIAGNOSIS — R09.81 CHRONIC NASAL CONGESTION: Primary | ICD-10-CM

## 2017-11-29 PROCEDURE — 99213 OFFICE O/P EST LOW 20 MIN: CPT | Performed by: FAMILY MEDICINE

## 2017-11-29 RX ORDER — AZELASTINE HCL 205.5 UG/1
1 SPRAY NASAL 2 TIMES DAILY
Qty: 30 ML | Refills: 0 | Status: SHIPPED | OUTPATIENT
Start: 2017-11-29 | End: 2017-12-09

## 2017-11-29 NOTE — PATIENT INSTRUCTIONS
Please continue fluticasone nasal spray. Please start her albuterol nebulizer at home and if symptoms are not improving in the next couple of days please let me know and I can send in a prescription for a steroid.     Take Nexium 2 tablets daily until you

## 2017-11-29 NOTE — PROGRESS NOTES
Lucy Good is a 63year old female. Patient presents with:  Cold: Stuufy nose,terrible reflux reic couple weeks now. HPI:   Patient complaining of nasal congestion for the past 2 weeks, feels it has gotten worse.   Did see ENT last year has a dev Comments)  Zomig [Zolmitriptan]    Itching    MEDICATIONS:       RaNITidine HCl 150 MG Oral Cap Take 1 capsule (150 mg total) by mouth every evening.  Disp: 30 capsule Rfl: 2   Drospirenone-Estradiol (ANGELIQ) 0.5-1 MG Oral Tab Take 1 tablet by mouth once d sigmoid erythema consistent with nonspecific colitis   • Colon polyps    • Decreased sexual desire 2/98   • Depression with anxiety    • Diverticulosis 4/06    scattered, minimal   • Endometriosis     s/p ablation therapy   • Femur fracture ([facility] [address] 4/18/12 TMs appear normal, nasal mucosa congested, deviated nasal septum with narrow left nasal passage. NECK: supple,no adenopathy  LUNGS: clear to auscultation, no wheezing, rhonchi or rales. CARDIO: RRR without murmur  ABDOMEN: Soft, nontender.     ASSESSMEN

## 2017-12-04 ENCOUNTER — TELEPHONE (OUTPATIENT)
Dept: FAMILY MEDICINE CLINIC | Facility: CLINIC | Age: 62
End: 2017-12-04

## 2017-12-04 DIAGNOSIS — R09.81 CHRONIC NASAL CONGESTION: Primary | ICD-10-CM

## 2017-12-04 NOTE — TELEPHONE ENCOUNTER
Symptoms are so much worse than last week. Saw Dr Jairo Moctezuma. last week. Wants to know what to do. Transferred to Triage .

## 2017-12-04 NOTE — TELEPHONE ENCOUNTER
Spoke to patient and gave information.      Future Appointments  Date Time Provider Siddhartha Aleman   12/5/2017 10:00 AM Carmencita Badillo MD [facility] [address]   12/19/2017 1:30 PM Horace Early MD [facility] [place]   1/18/2018 8:00 AM Bull Metcalf

## 2017-12-04 NOTE — TELEPHONE ENCOUNTER
S/s rhinitis, sore throat, tired, fever maybe yesterday. Says she has something different not ENT related. Advised take OTC for sore throat gargle with NACL H2O. Please advise. Referral done.     Rodney Matute MD  Otolaryngology (ENT)  [facility]

## 2017-12-05 NOTE — TELEPHONE ENCOUNTER
Patient called and LVM stating she was going to the [email or web address] and to cancel her appt for 12/5/17 at 10 am.

## 2018-01-07 ENCOUNTER — HOSPITAL ENCOUNTER (OUTPATIENT)
Age: 63
Discharge: HOME | End: 2018-01-07
Attending: FAMILY MEDICINE
Payer: MEDICARE

## 2018-01-07 VITALS
HEART RATE: 97 BPM | DIASTOLIC BLOOD PRESSURE: 84 MMHG | RESPIRATION RATE: 16 BRPM | TEMPERATURE: 98 F | SYSTOLIC BLOOD PRESSURE: 126 MMHG | OXYGEN SATURATION: 95 %

## 2018-01-07 DIAGNOSIS — J01.90 ACUTE SINUSITIS, RECURRENCE NOT SPECIFIED, UNSPECIFIED LOCATION: Primary | ICD-10-CM

## 2018-01-07 DIAGNOSIS — J06.9 ACUTE URI: ICD-10-CM

## 2018-01-07 PROCEDURE — 99214 OFFICE O/P EST MOD 30 MIN: CPT

## 2018-01-07 PROCEDURE — 99213 OFFICE O/P EST LOW 20 MIN: CPT

## 2018-01-07 RX ORDER — VALACYCLOVIR HYDROCHLORIDE 1 G/1
TABLET, FILM COATED ORAL EVERY 12 HOURS SCHEDULED
COMMUNITY
End: 2021-06-10

## 2018-01-07 NOTE — ED INITIAL ASSESSMENT (HPI)
Pt. Reports cold symptoms since Thanksgiving. This 3 days ago she became more ill, with fever, body aches, worsening cough, and worsening nasal congestion. Took left-over antibiotic at home, but only had 5 doses.

## 2018-01-07 NOTE — ED PROVIDER NOTES
Patient Seen in: [facility]    History   Patient presents with:  Cough/URI    Stated Complaint: FLU SYMPTOMS, COUGH, ALL STARTING AROUND THANKSGIVING    HPI    63-year-old female presented with chief complaint of sinus pain chronic, recurrent   • Intervertebral disc protrusion 4/6/05    L5/S1 small central protrusion effacing the thecal sax with a small annular fissure in the posterior annular fibers   • Left flank pain 8/13/10    radiating around to the left side of the abdo except as noted above.     Physical Exam   ED Triage Vitals [01/07/18 1229]  BP: 126/84  Pulse: 97  Resp: 16  Temp: 98.4 °F (36.9 °C)  Temp src: Oral  SpO2: 95 %  O2 Device: None (Room air)    Current:/84   Pulse 97   Temp 98.4 °F (36.9 °C) (Oral)   R

## 2018-01-08 RX ORDER — CEFDINIR 300 MG/1
300 CAPSULE ORAL 2 TIMES DAILY
Qty: 12 CAPSULE | Refills: 0 | Status: SHIPPED | OUTPATIENT
Start: 2018-01-08 | End: 2018-01-14

## 2018-01-08 NOTE — ED NOTES
Pt called requesting change in antibiotic due to allergy. Chart reviewed by Dr Dawn Lepe. Prescription changed.

## 2018-03-26 DIAGNOSIS — M54.81 CERVICO-OCCIPITAL NEURALGIA: ICD-10-CM

## 2018-03-26 DIAGNOSIS — I10 ESSENTIAL HYPERTENSION, BENIGN: ICD-10-CM

## 2018-03-26 DIAGNOSIS — G50.1 ATYPICAL FACIAL PAIN: ICD-10-CM

## 2018-03-26 DIAGNOSIS — G43.719 INTRACTABLE CHRONIC MIGRAINE WITHOUT AURA AND WITHOUT STATUS MIGRAINOSUS: ICD-10-CM

## 2018-03-26 DIAGNOSIS — G43.711 INTRACTABLE CHRONIC MIGRAINE WITHOUT AURA AND WITH STATUS MIGRAINOSUS: ICD-10-CM

## 2018-03-26 DIAGNOSIS — G43.011 INTRACTABLE MIGRAINE WITHOUT AURA AND WITH STATUS MIGRAINOSUS: ICD-10-CM

## 2018-03-26 DIAGNOSIS — I63.9 PERSISTENT MIGRAINE AURA WITH CEREBRAL INFARCTION AND WITHOUT STATUS MIGRAINOSUS, NOT INTRACTABLE (HCC): ICD-10-CM

## 2018-03-26 DIAGNOSIS — G43.701 CHRONIC MIGRAINE WITHOUT AURA WITH STATUS MIGRAINOSUS, NOT INTRACTABLE: ICD-10-CM

## 2018-03-26 DIAGNOSIS — G43.519 MIGRAINE AURA, PERSISTENT, INTRACTABLE: ICD-10-CM

## 2018-03-26 DIAGNOSIS — G43.609 PERSISTENT MIGRAINE AURA WITH CEREBRAL INFARCTION AND WITHOUT STATUS MIGRAINOSUS, NOT INTRACTABLE (HCC): ICD-10-CM

## 2018-03-26 RX ORDER — AMLODIPINE BESYLATE 5 MG/1
TABLET ORAL
Qty: 30 TABLET | Refills: 0 | Status: SHIPPED | OUTPATIENT
Start: 2018-03-26 | End: 2018-04-09

## 2018-03-26 NOTE — TELEPHONE ENCOUNTER
Last refill Oct 2017 x 6 months    Pt thought Dr David Wills didn't want to see her again. States someone in this office told her to \"just see another Dr\" last December.     Explained that I thought this referred to seeing the specialist if her symptoms didn

## 2018-03-29 PROCEDURE — 88305 TISSUE EXAM BY PATHOLOGIST: CPT | Performed by: INTERNAL MEDICINE

## 2018-04-05 RX ORDER — NARATRIPTAN 2.5 MG/1
TABLET ORAL
Qty: 10 TABLET | Refills: 0 | Status: SHIPPED | OUTPATIENT
Start: 2018-04-05 | End: 2018-04-09

## 2018-04-05 NOTE — TELEPHONE ENCOUNTER
Noted patient did not read [employer] message. Spoke with patient and offered appt with Dr Kashmir Almaraz 4/30/18 at 2 pm. Patient accepted appt, PSR informed to confirm appt. Patient stated she does not read [employer] messages. Patient states brand name Diony Sensor is very expensive, requesting refill for Generic Naratriptan. States dye in generic naratriptan makes her itchy, but she will take benadryl along with it.  Rx pending Dr Sharon Merino approval.

## 2018-04-09 ENCOUNTER — OFFICE VISIT (OUTPATIENT)
Dept: FAMILY MEDICINE CLINIC | Facility: CLINIC | Age: 63
End: 2018-04-09

## 2018-04-09 VITALS
RESPIRATION RATE: 18 BRPM | DIASTOLIC BLOOD PRESSURE: 62 MMHG | BODY MASS INDEX: 30.07 KG/M2 | OXYGEN SATURATION: 97 % | WEIGHT: 189.31 LBS | HEART RATE: 104 BPM | TEMPERATURE: 98 F | HEIGHT: 66.5 IN | SYSTOLIC BLOOD PRESSURE: 104 MMHG

## 2018-04-09 DIAGNOSIS — M54.50 ACUTE RIGHT-SIDED LOW BACK PAIN WITHOUT SCIATICA: Primary | ICD-10-CM

## 2018-04-09 DIAGNOSIS — J44.9 ASTHMA WITH COPD (CHRONIC OBSTRUCTIVE PULMONARY DISEASE) (HCC): ICD-10-CM

## 2018-04-09 DIAGNOSIS — M53.3 SACROILIAC JOINT DYSFUNCTION OF RIGHT SIDE: ICD-10-CM

## 2018-04-09 PROBLEM — M47.812 FACET ARTHROPATHY, CERVICAL: Status: ACTIVE | Noted: 2018-04-09

## 2018-04-09 PROBLEM — E27.49 SECONDARY ADRENAL INSUFFICIENCY (HCC): Status: ACTIVE | Noted: 2018-04-09

## 2018-04-09 PROCEDURE — 99213 OFFICE O/P EST LOW 20 MIN: CPT | Performed by: FAMILY MEDICINE

## 2018-04-09 RX ORDER — CYCLOBENZAPRINE HCL 10 MG
10 TABLET ORAL NIGHTLY
Qty: 15 TABLET | Refills: 0 | Status: SHIPPED | OUTPATIENT
Start: 2018-04-09 | End: 2018-04-24

## 2018-04-09 NOTE — PROGRESS NOTES
Maegan Merino is a 63year old female. Patient presents with:  Back Pain: Low back pain for three weeks. Hurts to sit. HPI:   Patient states recently started yoga classes and over the past 3 weeks has had some lower back discomfort.   Patient states Rfl: 0   Esomeprazole Magnesium (NEXIUM OR) Take by mouth. Disp:  Rfl:    AmLODIPine Besylate 5 MG Oral Tab Take 5 mg by mouth daily.  Disp:  Rfl:    ALPRAZOLAM 0.5 MG Oral Tab TAKE 1 TO 2 TABLET BY MOUTH EVERY NIGHT AT BEDTIME AS NEEDED FOR SLEEP Disp: 60 right SI joint, spasm of lumbar paraspinal muscles on the right, SLR negative, ROM full but with discomfort on lateral rotation. NEURO: bilateral LE strength is 5/5, DTR 2+ and symmetrical bilateral, normal gait.     ASSESSMENT AND PLAN:   [address] was see

## 2018-04-11 ENCOUNTER — TELEPHONE (OUTPATIENT)
Dept: FAMILY MEDICINE CLINIC | Facility: CLINIC | Age: 63
End: 2018-04-11

## 2018-04-11 DIAGNOSIS — M54.50 ACUTE RIGHT-SIDED LOW BACK PAIN WITHOUT SCIATICA: Primary | ICD-10-CM

## 2018-04-11 NOTE — TELEPHONE ENCOUNTER
Patient taking Rx as ordered. Tried heat and ice nothing helping. Spoke to PCP patient was to go to PT not Chiropractor. Patient says she went to Pt yesterday and is also going to [place].        Spoke to patient her pain is excruciating in her back also ment

## 2018-04-11 NOTE — TELEPHONE ENCOUNTER
\"ER back pain is so bad - she is in agony\". Offered 2:45 appt today - pt declined. Is supposed to go to the Chiropractor at 3 pm today but she is flat on her back right now. Would like to discuss other options with Dr Leslye Copeland. Pls call.     Trans

## 2018-04-30 DIAGNOSIS — I10 ESSENTIAL HYPERTENSION, BENIGN: ICD-10-CM

## 2018-05-01 RX ORDER — AMLODIPINE BESYLATE 5 MG/1
TABLET ORAL
Qty: 30 TABLET | Refills: 0 | Status: SHIPPED | OUTPATIENT
Start: 2018-05-01 | End: 2018-06-18

## 2018-05-01 NOTE — TELEPHONE ENCOUNTER
LOV 4/18    LAST LAB 4/17    LAST RX    AMLODIPINE BESYLATE 5 MG TABS Duplicate 70/60/6696 84/63/6584 5 mg  30         PROTOCOL    Hypertension Medications Protocol Failed4/30 11:31 AM   CMP or BMP in past 12 months     Refilled x 1. Mychart to patient.

## 2018-05-22 DIAGNOSIS — G43.609 PERSISTENT MIGRAINE AURA WITH CEREBRAL INFARCTION AND WITHOUT STATUS MIGRAINOSUS, NOT INTRACTABLE (HCC): ICD-10-CM

## 2018-05-22 DIAGNOSIS — I63.9 PERSISTENT MIGRAINE AURA WITH CEREBRAL INFARCTION AND WITHOUT STATUS MIGRAINOSUS, NOT INTRACTABLE (HCC): ICD-10-CM

## 2018-05-22 DIAGNOSIS — G43.711 INTRACTABLE CHRONIC MIGRAINE WITHOUT AURA AND WITH STATUS MIGRAINOSUS: ICD-10-CM

## 2018-05-22 DIAGNOSIS — G43.719 INTRACTABLE CHRONIC MIGRAINE WITHOUT AURA AND WITHOUT STATUS MIGRAINOSUS: ICD-10-CM

## 2018-05-22 DIAGNOSIS — G43.701 CHRONIC MIGRAINE WITHOUT AURA WITH STATUS MIGRAINOSUS, NOT INTRACTABLE: ICD-10-CM

## 2018-05-22 DIAGNOSIS — G43.011 INTRACTABLE MIGRAINE WITHOUT AURA AND WITH STATUS MIGRAINOSUS: ICD-10-CM

## 2018-05-22 DIAGNOSIS — G43.519 MIGRAINE AURA, PERSISTENT, INTRACTABLE: ICD-10-CM

## 2018-05-22 DIAGNOSIS — M54.81 CERVICO-OCCIPITAL NEURALGIA: ICD-10-CM

## 2018-05-22 DIAGNOSIS — G50.1 ATYPICAL FACIAL PAIN: ICD-10-CM

## 2018-05-23 RX ORDER — NARATRIPTAN 2.5 MG/1
TABLET ORAL
Qty: 10 TABLET | Refills: 0 | Status: SHIPPED | OUTPATIENT
Start: 2018-05-23 | End: 2018-06-25

## 2018-06-18 DIAGNOSIS — I10 ESSENTIAL HYPERTENSION, BENIGN: ICD-10-CM

## 2018-06-19 RX ORDER — AMLODIPINE BESYLATE 5 MG/1
TABLET ORAL
Qty: 30 TABLET | Refills: 0 | Status: SHIPPED | OUTPATIENT
Start: 2018-06-19 | End: 2018-07-03

## 2018-06-19 RX ORDER — MONTELUKAST SODIUM 10 MG/1
TABLET ORAL
Qty: 30 TABLET | Refills: 0 | Status: SHIPPED | OUTPATIENT
Start: 2018-06-19 | End: 2018-07-03

## 2018-06-19 NOTE — TELEPHONE ENCOUNTER
LOV 4/18     LAST LAB 4/17    LAST RX   AMLODIPINE BESYLATE 5 MG Oral Tab 30 tablet 0 5/1/2018         Next OV 7/3/2018      PROTOCOL    Hypertension Medications Protocol Failed6/19 2:17 PM   CMP or BMP in past 12 months   Asthma & COPD Medication Protocol

## 2018-06-19 NOTE — TELEPHONE ENCOUNTER
Hypertension Medications Protocol Failed6/18 6:54 PM   CMP or BMP in past 12 months    Last serum creatinine< 2.0    Appointment in past 6 or next 3 months     Pt needs MAPS appt and labs, please contact pt to schedule, Dr Mj Calix will order and do refills at [facility]

## 2018-06-19 NOTE — TELEPHONE ENCOUNTER
Called Patient. Appt scheduled. First Available MAPS APPT. However, patient had several questions. Transferred to Triage . PLS CALL.     Patient asked about:    Tremaine Duarte is currently out of her \"heart medication\" and it was filled by Dr Angela Coburn so she needs

## 2018-06-25 DIAGNOSIS — G43.701 CHRONIC MIGRAINE WITHOUT AURA WITH STATUS MIGRAINOSUS, NOT INTRACTABLE: ICD-10-CM

## 2018-06-25 DIAGNOSIS — G43.719 INTRACTABLE CHRONIC MIGRAINE WITHOUT AURA AND WITHOUT STATUS MIGRAINOSUS: ICD-10-CM

## 2018-06-25 DIAGNOSIS — G43.609 PERSISTENT MIGRAINE AURA WITH CEREBRAL INFARCTION AND WITHOUT STATUS MIGRAINOSUS, NOT INTRACTABLE (HCC): ICD-10-CM

## 2018-06-25 DIAGNOSIS — I63.9 PERSISTENT MIGRAINE AURA WITH CEREBRAL INFARCTION AND WITHOUT STATUS MIGRAINOSUS, NOT INTRACTABLE (HCC): ICD-10-CM

## 2018-06-25 DIAGNOSIS — G43.711 INTRACTABLE CHRONIC MIGRAINE WITHOUT AURA AND WITH STATUS MIGRAINOSUS: ICD-10-CM

## 2018-06-25 DIAGNOSIS — G43.519 MIGRAINE AURA, PERSISTENT, INTRACTABLE: ICD-10-CM

## 2018-06-25 DIAGNOSIS — G43.011 INTRACTABLE MIGRAINE WITHOUT AURA AND WITH STATUS MIGRAINOSUS: ICD-10-CM

## 2018-06-25 DIAGNOSIS — G50.1 ATYPICAL FACIAL PAIN: ICD-10-CM

## 2018-06-25 DIAGNOSIS — M54.81 CERVICO-OCCIPITAL NEURALGIA: ICD-10-CM

## 2018-06-27 RX ORDER — NARATRIPTAN 2.5 MG/1
TABLET ORAL
Qty: 9 TABLET | Refills: 0 | Status: SHIPPED | OUTPATIENT
Start: 2018-06-27 | End: 2018-07-03

## 2018-06-28 ENCOUNTER — LAB ENCOUNTER (OUTPATIENT)
Dept: LAB | Age: 63
End: 2018-06-28
Attending: INTERNAL MEDICINE
Payer: MEDICARE

## 2018-06-28 ENCOUNTER — LABORATORY ENCOUNTER (OUTPATIENT)
Dept: LAB | Age: 63
End: 2018-06-28
Attending: FAMILY MEDICINE
Payer: MEDICARE

## 2018-06-28 DIAGNOSIS — I25.10 CORONARY ATHEROSCLEROSIS OF NATIVE CORONARY ARTERY: Primary | ICD-10-CM

## 2018-06-28 DIAGNOSIS — R14.0 ABDOMINAL BLOATING: ICD-10-CM

## 2018-06-28 DIAGNOSIS — I10 ESSENTIAL HYPERTENSION, BENIGN: ICD-10-CM

## 2018-06-28 LAB
ALBUMIN SERPL-MCNC: 3.6 G/DL (ref 3.5–4.8)
ALP LIVER SERPL-CCNC: 30 U/L (ref 46–118)
ALT SERPL-CCNC: 30 U/L (ref 14–54)
AST SERPL-CCNC: 18 U/L (ref 15–41)
BILIRUB SERPL-MCNC: 0.4 MG/DL (ref 0.1–2)
BUN BLD-MCNC: 15 MG/DL (ref 8–20)
CALCIUM BLD-MCNC: 9.4 MG/DL (ref 8.3–10.3)
CHLORIDE: 104 MMOL/L (ref 101–111)
CO2: 28 MMOL/L (ref 22–32)
CREAT BLD-MCNC: 0.89 MG/DL (ref 0.55–1.02)
GLUCOSE BLD-MCNC: 91 MG/DL (ref 70–99)
IMMUNOGLOBULIN A: 198 MG/DL (ref 70–312)
M PROTEIN MFR SERPL ELPH: 7.4 G/DL (ref 6.1–8.3)
POTASSIUM SERPL-SCNC: 4.1 MMOL/L (ref 3.6–5.1)
SODIUM SERPL-SCNC: 141 MMOL/L (ref 136–144)

## 2018-06-28 PROCEDURE — 83516 IMMUNOASSAY NONANTIBODY: CPT

## 2018-06-28 PROCEDURE — 80053 COMPREHEN METABOLIC PANEL: CPT

## 2018-06-28 PROCEDURE — 84443 ASSAY THYROID STIM HORMONE: CPT

## 2018-06-28 PROCEDURE — 84460 ALANINE AMINO (ALT) (SGPT): CPT

## 2018-06-28 PROCEDURE — 80061 LIPID PANEL: CPT

## 2018-06-28 PROCEDURE — 82784 ASSAY IGA/IGD/IGG/IGM EACH: CPT

## 2018-06-28 PROCEDURE — 82306 VITAMIN D 25 HYDROXY: CPT

## 2018-06-28 PROCEDURE — 84450 TRANSFERASE (AST) (SGOT): CPT

## 2018-06-28 PROCEDURE — 36415 COLL VENOUS BLD VENIPUNCTURE: CPT

## 2018-07-02 LAB
TISSUE TRANSGLUTAMINASE AB,IGA: 1.5 U/ML (ref ?–15)
TISSUE TRANSGLUTAMINASE IGA QUALITATIVE: NEGATIVE

## 2018-07-03 ENCOUNTER — TELEPHONE (OUTPATIENT)
Dept: FAMILY MEDICINE CLINIC | Facility: CLINIC | Age: 63
End: 2018-07-03

## 2018-07-03 ENCOUNTER — OFFICE VISIT (OUTPATIENT)
Dept: FAMILY MEDICINE CLINIC | Facility: CLINIC | Age: 63
End: 2018-07-03

## 2018-07-03 VITALS
OXYGEN SATURATION: 97 % | HEIGHT: 66 IN | HEART RATE: 59 BPM | DIASTOLIC BLOOD PRESSURE: 86 MMHG | BODY MASS INDEX: 29.97 KG/M2 | TEMPERATURE: 99 F | WEIGHT: 186.5 LBS | RESPIRATION RATE: 18 BRPM | SYSTOLIC BLOOD PRESSURE: 118 MMHG

## 2018-07-03 DIAGNOSIS — Z12.39 SCREENING FOR MALIGNANT NEOPLASM OF BREAST: ICD-10-CM

## 2018-07-03 DIAGNOSIS — M79.7 FIBROMYALGIA: ICD-10-CM

## 2018-07-03 DIAGNOSIS — K21.9 GASTROESOPHAGEAL REFLUX DISEASE WITHOUT ESOPHAGITIS: ICD-10-CM

## 2018-07-03 DIAGNOSIS — Z00.00 ENCOUNTER FOR ANNUAL HEALTH EXAMINATION: Primary | ICD-10-CM

## 2018-07-03 DIAGNOSIS — E27.49 SECONDARY ADRENAL INSUFFICIENCY (HCC): ICD-10-CM

## 2018-07-03 DIAGNOSIS — Z78.0 POSTMENOPAUSAL: ICD-10-CM

## 2018-07-03 DIAGNOSIS — K58.2 IRRITABLE BOWEL SYNDROME WITH BOTH CONSTIPATION AND DIARRHEA: ICD-10-CM

## 2018-07-03 DIAGNOSIS — I10 ESSENTIAL HYPERTENSION, BENIGN: ICD-10-CM

## 2018-07-03 DIAGNOSIS — J45.20 ASTHMA, MILD INTERMITTENT, WELL-CONTROLLED: Chronic | ICD-10-CM

## 2018-07-03 DIAGNOSIS — F33.0 MAJOR DEPRESSIVE DISORDER, RECURRENT EPISODE, MILD (HCC): ICD-10-CM

## 2018-07-03 DIAGNOSIS — G43.009 MIGRAINE WITHOUT AURA AND WITHOUT STATUS MIGRAINOSUS, NOT INTRACTABLE: ICD-10-CM

## 2018-07-03 DIAGNOSIS — I25.10 CORONARY ARTERY DISEASE INVOLVING NATIVE CORONARY ARTERY OF NATIVE HEART WITHOUT ANGINA PECTORIS: ICD-10-CM

## 2018-07-03 DIAGNOSIS — M47.812 FACET ARTHROPATHY, CERVICAL: ICD-10-CM

## 2018-07-03 DIAGNOSIS — Z11.59 NEED FOR HEPATITIS C SCREENING TEST: ICD-10-CM

## 2018-07-03 PROCEDURE — G0439 PPPS, SUBSEQ VISIT: HCPCS | Performed by: FAMILY MEDICINE

## 2018-07-03 PROCEDURE — 96160 PT-FOCUSED HLTH RISK ASSMT: CPT | Performed by: FAMILY MEDICINE

## 2018-07-03 RX ORDER — MONTELUKAST SODIUM 10 MG/1
TABLET ORAL
Qty: 90 TABLET | Refills: 1 | Status: SHIPPED | OUTPATIENT
Start: 2018-07-03 | End: 2019-05-14

## 2018-07-03 RX ORDER — AMLODIPINE BESYLATE 5 MG/1
5 TABLET ORAL
Qty: 90 TABLET | Refills: 1 | Status: SHIPPED | OUTPATIENT
Start: 2018-07-03 | End: 2018-11-16

## 2018-07-03 NOTE — PROGRESS NOTES
7/3/2018  [place] [id number]    Dear [employer]uly [employer],       Here are your results from your recent visit with Gastroenterology.      Your blood testing was negative for celiac disease: an allergy to gluten which is primarily found

## 2018-07-03 NOTE — PATIENT INSTRUCTIONS
[facility]   Tests on this list are recommended by your physician but may not be covered, or covered at this frequency, by your insurer. Please check with your insurance carrier before scheduling to verify coverage.    Augusto Sanchez Colorectal Cancer Screening  Covered up to Age 76     Colonoscopy Screen   Covered every 10 years- more often if abnormal Colonoscopy,3 Years due on 10/31/2019 Update Health Maintenance if applicable    Flex Sigmoidoscopy Screen  Covered every 5 years No 65th birthday    Pneumococcal 23 (Pneumovax)  Covered Once after 65 No orders found for this or any previous visit. Please get once after your 65th birthday    Hepatitis B for Moderate/High Risk       No orders found for this or any previous visit.  Medium/ Lab or Procedure External Lab or Procedure   Diabetes Screening      HbgA1C    At Least  Annually for Diabetics No results found for: A1C No flowsheet data found.     Fasting Blood Sugar (FSB)   Patient must be diagnosed with one of the following:   • Hyper data found. Fecal Occult Blood   Covered Annually No results found for: FOB, OCCULTSTOOL No flowsheet data found.      Barium Enema-   uncomfortable but covered  Covered but uncomfortable   Glaucoma Screening      Ophthalmology Visit   Covered annually who received Factor VIII or IX concentrates   Clients of institutions for the mentally retarded   Persons who live in the same house as a HepB virus carrier   Homosexual men   Illicit injectable drug abusers     Tetanus Toxoid- Only covered with a cut with

## 2018-07-03 NOTE — PROGRESS NOTES
Patient presents with: Well Adult: MA Supervisit. Medication Follow-Up: Refill for AMLODIPINE and Singulair    HPI:   Trixie Friedman is a 63year old female who presents for a MA (Medicare Advantage) Supervisit (Once per calendar year).   Pap and breas takes aspirin and has it on her medication list.   CAGE Alcohol screening   Abbi Montejo was screened for Alcohol abuse and had a score of 0 so is at low risk.     Patient Care Team: Patient Care Team:  Doreen Wilson MD as PCP - General (Family Me 06/28/2018    (H) 06/28/2018   TRIG 333 (H) 06/28/2018          Last Chemistry Labs:     Lab Results  Component Value Date   AST 21 06/28/2018   ALT 30 06/28/2018   CA 9.3 06/28/2018   ALB 3.6 06/28/2018   TSH 1.570 06/28/2018   CREATSERUM 0.94 06/2 Take 1 tablet by mouth daily.      Current Facility-Administered Medications for the 7/3/18 encounter (Office Visit) with Jessica Greenberg MD:  Bupivacaine HCl (PF) (MARCAINE) 0.5 % injection 7 mL      MEDICAL INFORMATION:   She  has a past medical histor denies blurred vision or double vision  HEENT: denies nasal congestion, sinus pain or ST  LUNGS: denies shortness of breath with exertion  CARDIOVASCULAR: denies chest pain on exertion  GI: denies abdominal pain, denies heartburn  : denies dysuria, vagin • Kenalog Per 10mg, Bursa/Joint Inj 04/25/2013        ASSESSMENT AND OTHER RELEVANT CHRONIC CONDITIONS:   Cindy Pedraza is a 63year old female who presents for a Medicare Assessment. Cyndee Guallpa was seen today for well adult and medication follow-up. Exercise;Stretching; Other  How would you describe your daily physical activity?: Light  How would you describe your current health state?: Good  How do you maintain positive mental well-being?: Visiting Family; Visiting Friends;Puzzles; Social Interaction if applicable     Immunizations (Update Immunization Activity if applicable)     Influenza  Covered Annually  Please get every year    Pneumococcal 13 (Prevnar)  Covered Once after 65 No vaccine history found Please get once after your 65th birthday    Pne

## 2018-07-05 RX ORDER — ERGOCALCIFEROL 1.25 MG/1
CAPSULE ORAL
Qty: 12 CAPSULE | Refills: 0 | OUTPATIENT
Start: 2018-07-05

## 2018-07-05 NOTE — TELEPHONE ENCOUNTER
LOV    LAST LAB 6/18 Vit D ordered by Dr Adal Melo not addressed yet. LAST RX   ergocalciferol 16732 units Oral Cap 12 capsule 0 3/6/2017         Next OV Visit date not found      PROTOCOL  Rx denied therapy completed.

## 2018-07-09 RX ORDER — ERGOCALCIFEROL 1.25 MG/1
50000 CAPSULE ORAL WEEKLY
Qty: 12 CAPSULE | Refills: 0 | Status: SHIPPED | OUTPATIENT
Start: 2018-07-09 | End: 2018-10-07

## 2018-07-15 PROBLEM — J45.20 MILD INTERMITTENT EXTRINSIC ASTHMA WITHOUT COMPLICATION: Status: RESOLVED | Noted: 2017-09-18 | Resolved: 2018-07-15

## 2018-07-15 PROBLEM — J44.9 ASTHMA WITH COPD (CHRONIC OBSTRUCTIVE PULMONARY DISEASE): Chronic | Status: RESOLVED | Noted: 2017-10-22 | Resolved: 2018-07-15

## 2018-07-15 PROBLEM — M62.89 MUSCLE TIGHTNESS: Status: RESOLVED | Noted: 2017-05-30 | Resolved: 2018-07-15

## 2018-07-15 PROBLEM — J45.20 MILD INTERMITTENT EXTRINSIC ASTHMA WITHOUT COMPLICATION (HCC): Status: RESOLVED | Noted: 2017-09-18 | Resolved: 2018-07-15

## 2018-07-15 PROBLEM — J44.89 ASTHMA WITH COPD (CHRONIC OBSTRUCTIVE PULMONARY DISEASE): Chronic | Status: RESOLVED | Noted: 2017-10-22 | Resolved: 2018-07-15

## 2018-07-15 PROBLEM — J44.9 ASTHMA WITH COPD (CHRONIC OBSTRUCTIVE PULMONARY DISEASE) (HCC): Chronic | Status: RESOLVED | Noted: 2017-10-22 | Resolved: 2018-07-15

## 2018-07-15 PROBLEM — J44.89 ASTHMA WITH COPD (CHRONIC OBSTRUCTIVE PULMONARY DISEASE) (HCC): Chronic | Status: RESOLVED | Noted: 2017-10-22 | Resolved: 2018-07-15

## 2018-07-24 ENCOUNTER — OFFICE VISIT (OUTPATIENT)
Dept: NEUROLOGY | Facility: CLINIC | Age: 63
End: 2018-07-24
Payer: MEDICARE

## 2018-07-24 VITALS
DIASTOLIC BLOOD PRESSURE: 82 MMHG | BODY MASS INDEX: 29.73 KG/M2 | WEIGHT: 185 LBS | HEART RATE: 84 BPM | SYSTOLIC BLOOD PRESSURE: 130 MMHG | RESPIRATION RATE: 16 BRPM | HEIGHT: 66 IN

## 2018-07-24 DIAGNOSIS — G43.011 INTRACTABLE MIGRAINE WITHOUT AURA AND WITH STATUS MIGRAINOSUS: ICD-10-CM

## 2018-07-24 DIAGNOSIS — M54.81 CERVICO-OCCIPITAL NEURALGIA: ICD-10-CM

## 2018-07-24 DIAGNOSIS — G50.1 ATYPICAL FACIAL PAIN: ICD-10-CM

## 2018-07-24 DIAGNOSIS — G43.609 PERSISTENT MIGRAINE AURA WITH CEREBRAL INFARCTION AND WITHOUT STATUS MIGRAINOSUS, NOT INTRACTABLE (HCC): ICD-10-CM

## 2018-07-24 DIAGNOSIS — G43.711 INTRACTABLE CHRONIC MIGRAINE WITHOUT AURA AND WITH STATUS MIGRAINOSUS: ICD-10-CM

## 2018-07-24 DIAGNOSIS — G43.709 CHRONIC MIGRAINE W/O AURA W/O STATUS MIGRAINOSUS, NOT INTRACTABLE: Primary | ICD-10-CM

## 2018-07-24 DIAGNOSIS — G43.519 MIGRAINE AURA, PERSISTENT, INTRACTABLE: ICD-10-CM

## 2018-07-24 DIAGNOSIS — G43.719 INTRACTABLE CHRONIC MIGRAINE WITHOUT AURA AND WITHOUT STATUS MIGRAINOSUS: ICD-10-CM

## 2018-07-24 DIAGNOSIS — G43.701 CHRONIC MIGRAINE WITHOUT AURA WITH STATUS MIGRAINOSUS, NOT INTRACTABLE: ICD-10-CM

## 2018-07-24 DIAGNOSIS — I63.9 PERSISTENT MIGRAINE AURA WITH CEREBRAL INFARCTION AND WITHOUT STATUS MIGRAINOSUS, NOT INTRACTABLE (HCC): ICD-10-CM

## 2018-07-24 PROCEDURE — 99213 OFFICE O/P EST LOW 20 MIN: CPT | Performed by: OTHER

## 2018-07-24 RX ORDER — NARATRIPTAN 2.5 MG/1
TABLET ORAL
Qty: 18 TABLET | Refills: 2 | Status: SHIPPED | OUTPATIENT
Start: 2018-07-24 | End: 2018-11-16

## 2018-07-24 RX ORDER — ATORVASTATIN CALCIUM 10 MG/1
10 TABLET, FILM COATED ORAL NIGHTLY
COMMUNITY
End: 2018-10-09

## 2018-07-24 RX ORDER — AMITRIPTYLINE HYDROCHLORIDE 50 MG/1
TABLET, FILM COATED ORAL
Qty: 30 TABLET | Refills: 3 | Status: SHIPPED | OUTPATIENT
Start: 2018-07-24 | End: 2019-01-07

## 2018-07-24 NOTE — PROGRESS NOTES
HPI:    Patient ID: Israel Thomas is a 63year old female. HPI       Patient presents for follow up for headaches. States migraine is stable and also having less occipital tension headaches.  States BP is helping her and since then headaches has impr • Osteopenia 5/31/12   • Ovarian cyst 8/13/10    left   • Palpitations 2/98    likely anxiety related to dystrophic nails secondary to mechanical injury    • Postmenopausal HRT (hormone replacement therapy)    • Rectal bleeding 4/06    occasional   • Rot MOUTH AT 7.00 PM Disp: 30 tablet Rfl: 3   Diclofenac Sodium 50 MG Oral Tab EC Take 1 tablet (50 mg total) by mouth 2 (two) times daily as needed.  Disp: 60 tablet Rfl: 2   Naratriptan HCl 2.5 MG Oral Tab TAKE 1 TABLET BY MOUTH AT ONSET OF HEADACHE, MAY REPE COMMENTS)  Clarithromycin          NAUSEA ONLY    Comment:Nausea and abdominal cramping  Dairy Products          OTHER (SEE COMMENTS)  Dust                      Hydrocodone             ITCHING  Naproxen                    Comment:Headaches  Naratriptan stiffness. Optimal hypertension control    RTC in 5-6 months  See orders and medications filed with this encounter. The patient indicates understanding of these issues and agrees with the plan.       No orders of the defined types were placed in this enco

## 2018-07-24 NOTE — PROGRESS NOTES
Patient is here for a follow up on migraines. Patient stated that the BP medication is helping and her migraines are less.

## 2018-07-24 NOTE — PATIENT INSTRUCTIONS
Refill policies:    • Allow 2-3 business days for refills; controlled substances may take longer.   • Contact your pharmacy at least 5 days prior to running out of medication and have them send an electronic request or submit request through the “request re entire amount billed. Precertification and Prior Authorizations: If your physician has recommended that you have a procedure or additional testing performed.   [facility]) will contact your insurance carrier to obtain pre-certi

## 2018-07-25 ENCOUNTER — TELEPHONE (OUTPATIENT)
Dept: NEUROLOGY | Facility: CLINIC | Age: 63
End: 2018-07-25

## 2018-07-25 NOTE — TELEPHONE ENCOUNTER
Rec'd incoming fax from pharmacy reporting that they show pt has allergy to Naproxen, which is in same drug class as the ordered diclofenac.   Per patient, she does not have \"allergy\" to Naproxen, and has recently taken naproxen and other NSAIDs with no r

## 2018-09-25 ENCOUNTER — HOSPITAL ENCOUNTER (OUTPATIENT)
Dept: BONE DENSITY | Age: 63
Discharge: HOME | End: 2018-09-25
Attending: FAMILY MEDICINE
Payer: MEDICARE

## 2018-09-25 ENCOUNTER — HOSPITAL ENCOUNTER (OUTPATIENT)
Dept: MAMMOGRAPHY | Age: 63
Discharge: HOME | End: 2018-09-25
Attending: FAMILY MEDICINE
Payer: MEDICARE

## 2018-09-25 DIAGNOSIS — Z12.39 SCREENING FOR MALIGNANT NEOPLASM OF BREAST: ICD-10-CM

## 2018-09-25 DIAGNOSIS — Z78.0 POSTMENOPAUSAL: ICD-10-CM

## 2018-09-25 PROCEDURE — 77080 DXA BONE DENSITY AXIAL: CPT | Performed by: FAMILY MEDICINE

## 2018-09-25 PROCEDURE — 77063 BREAST TOMOSYNTHESIS BI: CPT | Performed by: FAMILY MEDICINE

## 2018-09-25 PROCEDURE — 77067 SCR MAMMO BI INCL CAD: CPT | Performed by: FAMILY MEDICINE

## 2018-09-27 ENCOUNTER — TELEPHONE (OUTPATIENT)
Dept: FAMILY MEDICINE CLINIC | Facility: CLINIC | Age: 63
End: 2018-09-27

## 2018-09-27 NOTE — TELEPHONE ENCOUNTER
R rest  I ice  C compression  E elevation    Can walk on her toes. Unable to bear weight. Advised we do not do injections of the knee here. Wants to be seen here. Given appointment.      Future Appointments   Date Time Provider Siddhartha Aleman   9/28/20

## 2018-09-27 NOTE — TELEPHONE ENCOUNTER
Pt called stating she did something to her knee last night - possibly at yoga. Cannot put weight on it. Wants a Cortisone shot form the Dr.  Informed her as per the nurse Dr won't be bale to give shot without diagnosis testing.  Nurse said to tell her she

## 2018-09-28 ENCOUNTER — OFFICE VISIT (OUTPATIENT)
Dept: FAMILY MEDICINE CLINIC | Facility: CLINIC | Age: 63
End: 2018-09-28
Payer: MEDICARE

## 2018-09-28 VITALS
OXYGEN SATURATION: 99 % | BODY MASS INDEX: 30.53 KG/M2 | WEIGHT: 190 LBS | HEART RATE: 96 BPM | DIASTOLIC BLOOD PRESSURE: 86 MMHG | HEIGHT: 66 IN | TEMPERATURE: 98 F | SYSTOLIC BLOOD PRESSURE: 110 MMHG | RESPIRATION RATE: 18 BRPM

## 2018-09-28 DIAGNOSIS — M25.561 ACUTE PAIN OF RIGHT KNEE: Primary | ICD-10-CM

## 2018-09-28 PROBLEM — J44.9 ASTHMA WITH COPD (CHRONIC OBSTRUCTIVE PULMONARY DISEASE): Chronic | Status: ACTIVE | Noted: 2018-09-28

## 2018-09-28 PROBLEM — J44.9 ASTHMA WITH COPD (CHRONIC OBSTRUCTIVE PULMONARY DISEASE) (HCC): Chronic | Status: ACTIVE | Noted: 2018-09-28

## 2018-09-28 PROBLEM — J44.89 ASTHMA WITH COPD (CHRONIC OBSTRUCTIVE PULMONARY DISEASE) (HCC): Chronic | Status: ACTIVE | Noted: 2018-09-28

## 2018-09-28 PROBLEM — J44.89 ASTHMA WITH COPD (CHRONIC OBSTRUCTIVE PULMONARY DISEASE): Chronic | Status: ACTIVE | Noted: 2018-09-28

## 2018-09-28 PROCEDURE — 99213 OFFICE O/P EST LOW 20 MIN: CPT | Performed by: FAMILY MEDICINE

## 2018-09-28 NOTE — PATIENT INSTRUCTIONS
continue to use the knee brace, use crutches to avoid pressure on the knee. Reducing Knee Pain and Swelling    Many treatments can help reduce pain and swelling in your knee.  Your healthcare provider or physical therapist may suggest one or more of the fo

## 2018-10-01 ENCOUNTER — TELEPHONE (OUTPATIENT)
Dept: FAMILY MEDICINE CLINIC | Facility: CLINIC | Age: 63
End: 2018-10-01

## 2018-10-01 DIAGNOSIS — M25.561 ACUTE PAIN OF RIGHT KNEE: Primary | ICD-10-CM

## 2018-10-01 NOTE — TELEPHONE ENCOUNTER
Pt left voice mail that she would like steroid prescribed? I called to verify message. She said,  she would like oral steroid or can she get injection in knee?

## 2018-10-01 NOTE — TELEPHONE ENCOUNTER
Called patient. States she would like to see Orthopedic doctor because it is becoming difficult to walk.   hospitals has not had MRI done yet, waiting to here from [phone] number given to patient and advised to call them and check on sta

## 2018-10-05 ENCOUNTER — TELEPHONE (OUTPATIENT)
Dept: NEUROLOGY | Facility: CLINIC | Age: 63
End: 2018-10-05

## 2018-10-05 RX ORDER — METHYLPREDNISOLONE 4 MG/1
TABLET ORAL
Qty: 1 PACKAGE | Refills: 0 | Status: SHIPPED | OUTPATIENT
Start: 2018-10-05 | End: 2018-10-22 | Stop reason: ALTCHOICE

## 2018-10-05 NOTE — TELEPHONE ENCOUNTER
S:  Patient has intermittent migraine x 4 days during day and at night. B: Patient states MDP has helped in past.  Patient was on Prednisone 9/7/18 x 5 days. A:  Patient has taken the Naratriptan 2/day x 4 days along with Advil but no relief.  Informed p

## 2018-10-16 PROBLEM — M25.561 ACUTE PAIN OF RIGHT KNEE: Status: ACTIVE | Noted: 2018-10-16

## 2018-10-16 PROBLEM — M79.604 PAIN OF RIGHT LOWER EXTREMITY: Status: ACTIVE | Noted: 2018-10-16

## 2018-10-21 DIAGNOSIS — E55.9 VITAMIN D DEFICIENCY: Primary | ICD-10-CM

## 2018-10-22 NOTE — TELEPHONE ENCOUNTER
LOV 9/18    LAST LAB    6/28/18 10:03 AM    25-Hydroxyvitamin D (Total) 30.0 - 100.0 ng/mL 18.3 Abnormally low         LAST RX   ergocalciferol 84315 units Oral Cap 12 capsule 0 7/9/2018         Next OV Visit date not found    PROTOCOL  Please advise.  No

## 2018-10-22 NOTE — TELEPHONE ENCOUNTER
ML informing patient she needs to have a Vitamin D lever drawn before medication is refilled. Advised to go to any [facility] lab location for blood draw. Order placed.

## 2018-10-23 RX ORDER — ERGOCALCIFEROL 1.25 MG/1
CAPSULE ORAL
Qty: 12 CAPSULE | Refills: 0 | OUTPATIENT
Start: 2018-10-23

## 2018-11-06 ENCOUNTER — HOSPITAL ENCOUNTER (OUTPATIENT)
Dept: MRI IMAGING | Age: 63
Discharge: HOME | End: 2018-11-06
Attending: ORTHOPAEDIC SURGERY
Payer: MEDICARE

## 2018-11-06 DIAGNOSIS — M25.561 ACUTE PAIN OF RIGHT KNEE: ICD-10-CM

## 2018-11-06 PROCEDURE — 73721 MRI JNT OF LWR EXTRE W/O DYE: CPT | Performed by: ORTHOPAEDIC SURGERY

## 2018-11-16 DIAGNOSIS — G43.719 INTRACTABLE CHRONIC MIGRAINE WITHOUT AURA AND WITHOUT STATUS MIGRAINOSUS: ICD-10-CM

## 2018-11-16 DIAGNOSIS — I10 ESSENTIAL HYPERTENSION, BENIGN: ICD-10-CM

## 2018-11-16 DIAGNOSIS — G43.519 MIGRAINE AURA, PERSISTENT, INTRACTABLE: ICD-10-CM

## 2018-11-16 DIAGNOSIS — G43.609 PERSISTENT MIGRAINE AURA WITH CEREBRAL INFARCTION AND WITHOUT STATUS MIGRAINOSUS, NOT INTRACTABLE (HCC): ICD-10-CM

## 2018-11-16 DIAGNOSIS — G43.701 CHRONIC MIGRAINE WITHOUT AURA WITH STATUS MIGRAINOSUS, NOT INTRACTABLE: ICD-10-CM

## 2018-11-16 DIAGNOSIS — G43.711 INTRACTABLE CHRONIC MIGRAINE WITHOUT AURA AND WITH STATUS MIGRAINOSUS: ICD-10-CM

## 2018-11-16 DIAGNOSIS — M54.81 CERVICO-OCCIPITAL NEURALGIA: ICD-10-CM

## 2018-11-16 DIAGNOSIS — I63.9 PERSISTENT MIGRAINE AURA WITH CEREBRAL INFARCTION AND WITHOUT STATUS MIGRAINOSUS, NOT INTRACTABLE (HCC): ICD-10-CM

## 2018-11-16 DIAGNOSIS — G50.1 ATYPICAL FACIAL PAIN: ICD-10-CM

## 2018-11-16 DIAGNOSIS — G43.011 INTRACTABLE MIGRAINE WITHOUT AURA AND WITH STATUS MIGRAINOSUS: ICD-10-CM

## 2018-11-16 RX ORDER — NARATRIPTAN 2.5 MG/1
TABLET ORAL
Qty: 18 TABLET | Refills: 2 | Status: SHIPPED | OUTPATIENT
Start: 2018-11-16 | End: 2019-08-29

## 2018-11-16 RX ORDER — AMLODIPINE BESYLATE 5 MG/1
TABLET ORAL
Qty: 30 TABLET | Refills: 0 | Status: SHIPPED | OUTPATIENT
Start: 2018-11-16 | End: 2019-03-04

## 2018-11-16 NOTE — TELEPHONE ENCOUNTER
Name from pharmacy: AMLODIPINE BESYLATE 5MG TABLETS         Will file in chart as: AMLODIPINE BESYLATE 5 MG Oral Tab    Sig: TAKE 1 TABLET(5 MG) BY MOUTH EVERY DAY    Disp:  90 tablet    Refills:  0    Start: 11/16/2018    Class: Normal    For: Essential

## 2019-01-07 DIAGNOSIS — G43.701 CHRONIC MIGRAINE WITHOUT AURA WITH STATUS MIGRAINOSUS, NOT INTRACTABLE: Primary | ICD-10-CM

## 2019-01-08 RX ORDER — AMITRIPTYLINE HYDROCHLORIDE 50 MG/1
TABLET, FILM COATED ORAL
Qty: 30 TABLET | Refills: 0 | Status: SHIPPED | OUTPATIENT
Start: 2019-01-08 | End: 2019-01-23

## 2019-02-07 DIAGNOSIS — I10 ESSENTIAL HYPERTENSION, BENIGN: ICD-10-CM

## 2019-02-07 RX ORDER — AMLODIPINE BESYLATE 5 MG/1
TABLET ORAL
Qty: 30 TABLET | Refills: 0 | OUTPATIENT
Start: 2019-02-07

## 2019-02-07 NOTE — TELEPHONE ENCOUNTER
AmLODIPine Besylate 5 MG Oral Tab          Sig: N/A    Disp:  30 tablet    Refills:  0    Start: 2/7/2019    Class: Normal    Non-formulary For: Essential hypertension, benign    To pharmacy: Needs appointment in order to fill for 90 days only #30 given 0

## 2019-02-21 DIAGNOSIS — I10 ESSENTIAL HYPERTENSION, BENIGN: ICD-10-CM

## 2019-02-25 RX ORDER — AMLODIPINE BESYLATE 5 MG/1
TABLET ORAL
Qty: 30 TABLET | Refills: 0 | Status: SHIPPED | OUTPATIENT
Start: 2019-02-25 | End: 2019-03-04

## 2019-02-25 NOTE — TELEPHONE ENCOUNTER
LOV 9/18    LAST LAB 6/18    LAST RX   AMLODIPINE BESYLATE 5 MG Oral Tab 30 tablet 0 11/16/2018    Sig : Zully Lincoln 1 TABLET(5 MG) BY MOUTH EVERY DAY     Route:   (none)     Comment:   Needs appointment in order to fill for 90 days only #30 given 0 refills plea

## 2019-02-26 PROCEDURE — 88175 CYTOPATH C/V AUTO FLUID REDO: CPT | Performed by: OBSTETRICS & GYNECOLOGY

## 2019-03-04 PROBLEM — I70.0 ABDOMINAL AORTIC ATHEROSCLEROSIS: Status: ACTIVE | Noted: 2019-03-04

## 2019-03-04 PROBLEM — I70.0 ABDOMINAL AORTIC ATHEROSCLEROSIS (HCC): Status: ACTIVE | Noted: 2019-03-04

## 2019-03-04 PROBLEM — E55.9 VITAMIN D DEFICIENCY: Status: ACTIVE | Noted: 2019-03-04

## 2019-03-04 PROBLEM — E78.2 MIXED HYPERLIPIDEMIA: Status: ACTIVE | Noted: 2019-03-04

## 2019-05-04 DIAGNOSIS — J45.20 ASTHMA, MILD INTERMITTENT, WELL-CONTROLLED: Chronic | ICD-10-CM

## 2019-05-07 RX ORDER — MONTELUKAST SODIUM 10 MG/1
TABLET ORAL
Qty: 90 TABLET | Refills: 0 | OUTPATIENT
Start: 2019-05-07

## 2019-05-07 NOTE — TELEPHONE ENCOUNTER
No longer our patient  Future Appointments   Date Time Provider Siddhartha Love   5/20/2019  1:15 PM Reynaldo Hernandez MD [address] [facility] [address] [place]

## 2019-07-22 ENCOUNTER — TELEPHONE (OUTPATIENT)
Dept: NEUROLOGY | Facility: CLINIC | Age: 64
End: 2019-07-22

## 2019-07-22 DIAGNOSIS — M62.89 MUSCLE TIGHTNESS: ICD-10-CM

## 2019-07-22 RX ORDER — METHYLPREDNISOLONE 4 MG/1
TABLET ORAL
Qty: 1 PACKAGE | Refills: 0 | Status: SHIPPED | OUTPATIENT
Start: 2019-07-22 | End: 2019-08-10 | Stop reason: ALTCHOICE

## 2019-07-22 RX ORDER — TIZANIDINE 2 MG/1
TABLET ORAL
Qty: 60 TABLET | Refills: 0 | Status: SHIPPED | OUTPATIENT
Start: 2019-07-22 | End: 2020-01-06

## 2019-07-22 NOTE — TELEPHONE ENCOUNTER
Patient notified Rx Medrol Dose Osman as well as Rx Tiznaidine 2mg sent to [place]. Meek't scheduled 8/12.

## 2019-07-22 NOTE — TELEPHONE ENCOUNTER
Acute Migraine assessment:    Is this your typical migraine? Describe any change in character from past migraines. No: Coming on more suddenly and are more intense. Location of Pain (select all that apply):  temple,right side, top neck.   # Days pain h

## 2019-08-01 NOTE — TELEPHONE ENCOUNTER
Left message for pt to call office at 0800 to inform whether she would like to come in for T/D injection and what time she would like to come in.

## 2019-08-02 NOTE — TELEPHONE ENCOUNTER
Patient cancelled toradol/ decadron injection. Patient misunderstood. Patient does not have a headache now, she is just experiencing them more often. Patient is interested in starting Aimovig.  Patient stated she will discuss this at her upcoming appt 8/12/

## 2019-08-02 NOTE — TELEPHONE ENCOUNTER
Spoke with pt, accepted Toradol/Decadron appt today at 1PM. Gave info to [facility] staff to schedule.

## 2019-08-10 ENCOUNTER — APPOINTMENT (OUTPATIENT)
Dept: GENERAL RADIOLOGY | Facility: HOSPITAL | Age: 64
End: 2019-08-10
Payer: MEDICARE

## 2019-08-10 ENCOUNTER — HOSPITAL ENCOUNTER (EMERGENCY)
Facility: HOSPITAL | Age: 64
Discharge: HOME | End: 2019-08-10
Attending: EMERGENCY MEDICINE
Payer: MEDICARE

## 2019-08-10 VITALS
SYSTOLIC BLOOD PRESSURE: 120 MMHG | HEIGHT: 66 IN | TEMPERATURE: 98 F | OXYGEN SATURATION: 97 % | HEART RATE: 86 BPM | RESPIRATION RATE: 17 BRPM | DIASTOLIC BLOOD PRESSURE: 68 MMHG | WEIGHT: 185 LBS | BODY MASS INDEX: 29.73 KG/M2
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
## 2019-08-10 DIAGNOSIS — R60.0 PEDAL EDEMA: ICD-10-CM

## 2019-08-10 DIAGNOSIS — S90.569A INSECT BITE OF ANKLE, UNSPECIFIED LATERALITY, INITIAL ENCOUNTER: ICD-10-CM

## 2019-08-10 DIAGNOSIS — R07.89 CHEST PAIN, ATYPICAL: Primary | ICD-10-CM

## 2019-08-10 LAB
ALBUMIN SERPL-MCNC: 3.6 G/DL (ref 3.4–5)
ALBUMIN/GLOB SERPL: 1 {RATIO} (ref 1–2)
ALP LIVER SERPL-CCNC: 34 U/L (ref 50–130)
ALT SERPL-CCNC: 29 U/L (ref 13–56)
ANION GAP SERPL CALC-SCNC: 5 MMOL/L (ref 0–18)
AST SERPL-CCNC: 25 U/L (ref 15–37)
BASOPHILS # BLD AUTO: 0.04 X10(3) UL (ref 0–0.2)
BASOPHILS NFR BLD AUTO: 0.5 %
BILIRUB SERPL-MCNC: 0.3 MG/DL (ref 0.1–2)
BUN BLD-MCNC: 13 MG/DL (ref 7–18)
BUN/CREAT SERPL: 14 (ref 10–20)
CALCIUM BLD-MCNC: 8.9 MG/DL (ref 8.5–10.1)
CHLORIDE SERPL-SCNC: 107 MMOL/L (ref 98–112)
CO2 SERPL-SCNC: 29 MMOL/L (ref 21–32)
CREAT BLD-MCNC: 0.93 MG/DL (ref 0.55–1.02)
D-DIMER: 0.4 UG/ML FEU (ref ?–0.61)
DEPRECATED RDW RBC AUTO: 42.3 FL (ref 35.1–46.3)
EOSINOPHIL # BLD AUTO: 0.17 X10(3) UL (ref 0–0.7)
EOSINOPHIL NFR BLD AUTO: 2 %
ERYTHROCYTE [DISTWIDTH] IN BLOOD BY AUTOMATED COUNT: 13.2 % (ref 11–15)
GLOBULIN PLAS-MCNC: 3.6 G/DL (ref 2.8–4.4)
GLUCOSE BLD-MCNC: 94 MG/DL (ref 70–99)
HCT VFR BLD AUTO: 40.3 % (ref 35–48)
HGB BLD-MCNC: 13.3 G/DL (ref 12–16)
IMM GRANULOCYTES # BLD AUTO: 0.04 X10(3) UL (ref 0–1)
IMM GRANULOCYTES NFR BLD: 0.5 %
LYMPHOCYTES # BLD AUTO: 2.51 X10(3) UL (ref 1–4)
LYMPHOCYTES NFR BLD AUTO: 29.1 %
M PROTEIN MFR SERPL ELPH: 7.2 G/DL (ref 6.4–8.2)
MCH RBC QN AUTO: 28.9 PG (ref 26–34)
MCHC RBC AUTO-ENTMCNC: 33 G/DL (ref 31–37)
MCV RBC AUTO: 87.4 FL (ref 80–100)
MONOCYTES # BLD AUTO: 0.67 X10(3) UL (ref 0.1–1)
MONOCYTES NFR BLD AUTO: 7.8 %
NEUTROPHILS # BLD AUTO: 5.19 X10 (3) UL (ref 1.5–7.7)
NEUTROPHILS # BLD AUTO: 5.19 X10(3) UL (ref 1.5–7.7)
NEUTROPHILS NFR BLD AUTO: 60.1 %
NT-PROBNP SERPL-MCNC: 49 PG/ML (ref ?–125)
OSMOLALITY SERPL CALC.SUM OF ELEC: 292 MOSM/KG (ref 275–295)
PLATELET # BLD AUTO: 254 10(3)UL (ref 150–450)
POTASSIUM SERPL-SCNC: 3.5 MMOL/L (ref 3.5–5.1)
RBC # BLD AUTO: 4.61 X10(6)UL (ref 3.8–5.3)
SODIUM SERPL-SCNC: 141 MMOL/L (ref 136–145)
TROPONIN I SERPL-MCNC: <0.045 NG/ML (ref ?–0.04)
WBC # BLD AUTO: 8.6 X10(3) UL (ref 4–11)

## 2019-08-10 PROCEDURE — 93005 ELECTROCARDIOGRAM TRACING: CPT

## 2019-08-10 PROCEDURE — 80053 COMPREHEN METABOLIC PANEL: CPT | Performed by: EMERGENCY MEDICINE

## 2019-08-10 PROCEDURE — 84484 ASSAY OF TROPONIN QUANT: CPT

## 2019-08-10 PROCEDURE — 93010 ELECTROCARDIOGRAM REPORT: CPT

## 2019-08-10 PROCEDURE — 99284 EMERGENCY DEPT VISIT MOD MDM: CPT

## 2019-08-10 PROCEDURE — 85025 COMPLETE CBC W/AUTO DIFF WBC: CPT | Performed by: EMERGENCY MEDICINE

## 2019-08-10 PROCEDURE — 83880 ASSAY OF NATRIURETIC PEPTIDE: CPT | Performed by: EMERGENCY MEDICINE

## 2019-08-10 PROCEDURE — 71045 X-RAY EXAM CHEST 1 VIEW: CPT

## 2019-08-10 PROCEDURE — 85025 COMPLETE CBC W/AUTO DIFF WBC: CPT

## 2019-08-10 PROCEDURE — 99285 EMERGENCY DEPT VISIT HI MDM: CPT

## 2019-08-10 PROCEDURE — 84484 ASSAY OF TROPONIN QUANT: CPT | Performed by: EMERGENCY MEDICINE

## 2019-08-10 PROCEDURE — 85378 FIBRIN DEGRADE SEMIQUANT: CPT | Performed by: EMERGENCY MEDICINE

## 2019-08-10 PROCEDURE — 36415 COLL VENOUS BLD VENIPUNCTURE: CPT

## 2019-08-10 PROCEDURE — 80053 COMPREHEN METABOLIC PANEL: CPT

## 2019-08-10 NOTE — ED PROVIDER NOTES
Patient Seen in: [facility] Emergency Department    History   Patient presents with:  Chest Pain Angina (cardiovascular)    Stated Complaint: chest pain - from IC     HPI    Patient presents with feet swelling and chest pain.   The patient states khushboo fracture  internal fixation of the right hip   • HTN (hypertension)    • Hyperlipidemia    • IBS (irritable bowel syndrome) 03/17/2006    chronic, recurrent.  GI: Dr. Nickie Rivera   • Intervertebral disc protrusion 4/6/05    L5/S1 small central protrusion Left 7/20/2016    Performed by Kami Jensen MD at [facility] MAIN OR   • REMOVAL GALLBLADDER     • SUBDELTOID BURSA INJECTION RIGHT OR LEFT Left 7/1/2014    Performed by Kami Jensen MD at [facility] MAIN OR   • TUBAL LIGATION                      Social History contribute to the exclusion of venous thromboembolism with a negative predictive value of approximately 95% when results are used as part of the total clinical evaluation of the patient. CBC WITH DIFFERENTIAL WITH PLATELET    Narrative:      The following encouraged her to follow-up with her primary for an outpatient stress test.  She likely does have a component of anxiety to her symptoms. If her pain worsens, she feels short of breath or has further concerns, she should return here for repeat evaluation.

## 2019-08-10 NOTE — ED INITIAL ASSESSMENT (HPI)
Pt sent to ER from [address] [place] [address] for chest pain - reports left sided that radiates into breast and somewhat into back - reports has been intermittent x2 weeks w/ some dizziness and bilateral lower extremity swelling  - denies N/V/diaphoresis     Recent travel to and

## 2019-08-12 ENCOUNTER — TELEPHONE (OUTPATIENT)
Dept: NEUROLOGY | Facility: CLINIC | Age: 64
End: 2019-08-12

## 2019-08-12 ENCOUNTER — OFFICE VISIT (OUTPATIENT)
Dept: NEUROLOGY | Facility: CLINIC | Age: 64
End: 2019-08-12
Payer: MEDICARE

## 2019-08-12 VITALS
BODY MASS INDEX: 30 KG/M2 | DIASTOLIC BLOOD PRESSURE: 70 MMHG | SYSTOLIC BLOOD PRESSURE: 110 MMHG | HEART RATE: 70 BPM | RESPIRATION RATE: 18 BRPM | WEIGHT: 185 LBS

## 2019-08-12 DIAGNOSIS — R51.9 WORSENING HEADACHES: ICD-10-CM

## 2019-08-12 DIAGNOSIS — G43.519 MIGRAINE AURA, PERSISTENT, INTRACTABLE: ICD-10-CM

## 2019-08-12 DIAGNOSIS — G43.719 INTRACTABLE CHRONIC MIGRAINE WITHOUT AURA AND WITHOUT STATUS MIGRAINOSUS: ICD-10-CM

## 2019-08-12 DIAGNOSIS — G43.609 PERSISTENT MIGRAINE AURA WITH CEREBRAL INFARCTION AND WITHOUT STATUS MIGRAINOSUS, NOT INTRACTABLE (HCC): ICD-10-CM

## 2019-08-12 DIAGNOSIS — G43.701 CHRONIC MIGRAINE WITHOUT AURA WITH STATUS MIGRAINOSUS, NOT INTRACTABLE: Primary | ICD-10-CM

## 2019-08-12 DIAGNOSIS — I63.9 PERSISTENT MIGRAINE AURA WITH CEREBRAL INFARCTION AND WITHOUT STATUS MIGRAINOSUS, NOT INTRACTABLE (HCC): ICD-10-CM

## 2019-08-12 LAB
ATRIAL RATE: 82 BPM
P AXIS: 44 DEGREES
P-R INTERVAL: 152 MS
Q-T INTERVAL: 382 MS
QRS DURATION: 98 MS
QTC CALCULATION (BEZET): 446 MS
R AXIS: -49 DEGREES
T AXIS: -13 DEGREES
VENTRICULAR RATE: 82 BPM

## 2019-08-12 PROCEDURE — 99213 OFFICE O/P EST LOW 20 MIN: CPT | Performed by: OTHER

## 2019-08-12 NOTE — PROGRESS NOTES
The patient is here for migraines, she states she is having 10-15 migraines a month again. Patient has noticed a decrease in short term memory and dizziness. Patient denies any speech changes, vision change, or balance issues.

## 2019-08-12 NOTE — TELEPHONE ENCOUNTER
Emgality Questionnaire:      Year of initial migraine onset? 1975  Does patient have more than 15 headache days a month? yes   How many days monthly? 18  Do headaches last 4 hours or more per day?   yes  Have headaches persisted with this frequency for 3

## 2019-08-13 NOTE — PROGRESS NOTES
HPI:    Patient ID: Maegan Merino is a 66year old female. Migraine         Patient is here for migraines. She was last seen about a year ago. She states she is having 10-15 migraines a month again.  Patient has noticed a decrease in short term memor 2/98    likely anxiety related to dystrophic nails secondary to mechanical injury    • Postmenopausal HRT (hormone replacement therapy)    • Seasonal allergies    • Vitamin D deficiency 3/4/2019      Past Surgical History:   Procedure Laterality Date   • C Breast Cancer Paternal Aunt 21      Social History    Tobacco Use      Smoking status: Never Smoker      Smokeless tobacco: Never Used      Tobacco comment: 20 yrs of second hand smoke, mother smoked    Alcohol use:  Yes      Alcohol/week: 0.0 - 1.0 do G Oral Tab Take by mouth every 12 (twelve) hours. Disp:  Rfl:    Ibuprofen (ADVIL) 200 MG Oral Cap Take by mouth.  Disp:  Rfl:    aspirin-acetaminophen-caffeine (EXCEDRIN MIGRAINE) 250-250-65 MG Oral Tab Take 1 tablet by mouth every 6 (six) hours as neede normal.   Abdominal: Soft. Bowel sounds are normal.   Skin: Skin is warm and dry. Psychiatric:  normal mood and affect. Neurological   Awake, alert and oriented to time, place and person.    Speech is fluent with intact comprehension, repetition and nam

## 2019-08-15 NOTE — TELEPHONE ENCOUNTER
Spoke with pt. Informed her that PA has not been denied at this time. PA is still pending. Explained to pt that when PA is either denied or authorized she will be notified.  Pt expressed understanding and was encouraged to call office back with any further

## 2019-08-16 NOTE — TELEPHONE ENCOUNTER
received a fax from [facility]    Approval for Emgality 120mg from 5/18/19-8/16/20 - Approval #[id number]

## 2019-08-16 NOTE — TELEPHONE ENCOUNTER
Relayed to [facility] at pharmacy. Verbalized understanding. Informed pt, verbalized understanding.

## 2019-08-26 ENCOUNTER — TELEPHONE (OUTPATIENT)
Dept: NEUROLOGY | Facility: CLINIC | Age: 64
End: 2019-08-26

## 2019-08-26 NOTE — TELEPHONE ENCOUNTER
Pt received first Emgality dose, but is hesitant to give it to herself without some additional instruction. Asking if she could come in for a nurse visit, and refresher for the injection.   Scheduled nurse visit for tomorrow, 8/27/19, at [address] [facility] her

## 2019-08-27 ENCOUNTER — NURSE ONLY (OUTPATIENT)
Dept: NEUROLOGY | Facility: CLINIC | Age: 64
End: 2019-08-27
Payer: MEDICARE

## 2019-08-27 NOTE — PROGRESS NOTES
Pt in office for assistance with Emgality injections. Pt states she had forgotten some of the instructions given during last office visit by the time she picked up her medication.      Technique, location, and cleaning gone over with pt again, EXP date veri

## 2019-08-29 DIAGNOSIS — M54.81 CERVICO-OCCIPITAL NEURALGIA: ICD-10-CM

## 2019-08-29 DIAGNOSIS — G43.711 INTRACTABLE CHRONIC MIGRAINE WITHOUT AURA AND WITH STATUS MIGRAINOSUS: ICD-10-CM

## 2019-08-29 DIAGNOSIS — I63.9 PERSISTENT MIGRAINE AURA WITH CEREBRAL INFARCTION AND WITHOUT STATUS MIGRAINOSUS, NOT INTRACTABLE (HCC): ICD-10-CM

## 2019-08-29 DIAGNOSIS — G43.719 INTRACTABLE CHRONIC MIGRAINE WITHOUT AURA AND WITHOUT STATUS MIGRAINOSUS: ICD-10-CM

## 2019-08-29 DIAGNOSIS — G43.609 PERSISTENT MIGRAINE AURA WITH CEREBRAL INFARCTION AND WITHOUT STATUS MIGRAINOSUS, NOT INTRACTABLE (HCC): ICD-10-CM

## 2019-08-29 DIAGNOSIS — G43.519 MIGRAINE AURA, PERSISTENT, INTRACTABLE: ICD-10-CM

## 2019-08-29 DIAGNOSIS — G43.701 CHRONIC MIGRAINE WITHOUT AURA WITH STATUS MIGRAINOSUS, NOT INTRACTABLE: ICD-10-CM

## 2019-08-29 DIAGNOSIS — G50.1 ATYPICAL FACIAL PAIN: ICD-10-CM

## 2019-08-29 DIAGNOSIS — G43.011 INTRACTABLE MIGRAINE WITHOUT AURA AND WITH STATUS MIGRAINOSUS: ICD-10-CM

## 2019-08-29 RX ORDER — NARATRIPTAN 2.5 MG/1
TABLET ORAL
Qty: 18 TABLET | Refills: 0 | Status: SHIPPED | OUTPATIENT
Start: 2019-08-29 | End: 2020-01-06

## 2019-09-06 NOTE — TELEPHONE ENCOUNTER
Spoke with patient regarding Naratriptan allergy. Patient states she is allergic to blue dye, not Naratriptan. States she has to specify a certain  to obtain the white pills.

## 2019-09-11 ENCOUNTER — TELEPHONE (OUTPATIENT)
Dept: NEUROLOGY | Facility: CLINIC | Age: 64
End: 2019-09-11

## 2019-09-16 DIAGNOSIS — G43.519 MIGRAINE AURA, PERSISTENT, INTRACTABLE: ICD-10-CM

## 2019-09-16 DIAGNOSIS — G43.701 CHRONIC MIGRAINE WITHOUT AURA WITH STATUS MIGRAINOSUS, NOT INTRACTABLE: ICD-10-CM

## 2019-09-16 DIAGNOSIS — I63.9 PERSISTENT MIGRAINE AURA WITH CEREBRAL INFARCTION AND WITHOUT STATUS MIGRAINOSUS, NOT INTRACTABLE (HCC): ICD-10-CM

## 2019-09-16 DIAGNOSIS — G43.609 PERSISTENT MIGRAINE AURA WITH CEREBRAL INFARCTION AND WITHOUT STATUS MIGRAINOSUS, NOT INTRACTABLE (HCC): ICD-10-CM

## 2019-09-16 DIAGNOSIS — G43.719 INTRACTABLE CHRONIC MIGRAINE WITHOUT AURA AND WITHOUT STATUS MIGRAINOSUS: ICD-10-CM

## 2019-09-16 RX ORDER — GALCANEZUMAB 120 MG/ML
INJECTION, SOLUTION SUBCUTANEOUS
Refills: 0 | OUTPATIENT
Start: 2019-09-16

## 2019-09-18 ENCOUNTER — TELEPHONE (OUTPATIENT)
Dept: NEUROLOGY | Facility: CLINIC | Age: 64
End: 2019-09-18

## 2019-09-18 NOTE — TELEPHONE ENCOUNTER
Per Epic review, Emgality was approved from 8/18/2019 - 8/16/2020. Called patient's pharmacy and they state the medication is ready to be picked up - co-pay is $90.70 which is a change from prior months where co-pay was $42.00.  Pharmacy is not sure why

## 2019-11-14 PROCEDURE — 88305 TISSUE EXAM BY PATHOLOGIST: CPT | Performed by: INTERNAL MEDICINE

## 2020-01-04 DIAGNOSIS — G43.519 MIGRAINE AURA, PERSISTENT, INTRACTABLE: ICD-10-CM

## 2020-01-04 DIAGNOSIS — G43.701 CHRONIC MIGRAINE WITHOUT AURA WITH STATUS MIGRAINOSUS, NOT INTRACTABLE: ICD-10-CM

## 2020-01-04 DIAGNOSIS — G43.719 INTRACTABLE CHRONIC MIGRAINE WITHOUT AURA AND WITHOUT STATUS MIGRAINOSUS: ICD-10-CM

## 2020-01-04 DIAGNOSIS — G43.609 PERSISTENT MIGRAINE AURA WITH CEREBRAL INFARCTION AND WITHOUT STATUS MIGRAINOSUS, NOT INTRACTABLE (HCC): ICD-10-CM

## 2020-01-04 DIAGNOSIS — M54.81 CERVICO-OCCIPITAL NEURALGIA: ICD-10-CM

## 2020-01-04 DIAGNOSIS — I63.9 PERSISTENT MIGRAINE AURA WITH CEREBRAL INFARCTION AND WITHOUT STATUS MIGRAINOSUS, NOT INTRACTABLE (HCC): ICD-10-CM

## 2020-01-04 DIAGNOSIS — G50.1 ATYPICAL FACIAL PAIN: ICD-10-CM

## 2020-01-04 DIAGNOSIS — G43.711 INTRACTABLE CHRONIC MIGRAINE WITHOUT AURA AND WITH STATUS MIGRAINOSUS: ICD-10-CM

## 2020-01-04 DIAGNOSIS — G43.011 INTRACTABLE MIGRAINE WITHOUT AURA AND WITH STATUS MIGRAINOSUS: ICD-10-CM

## 2020-01-06 ENCOUNTER — TELEPHONE (OUTPATIENT)
Dept: NEUROLOGY | Facility: CLINIC | Age: 65
End: 2020-01-06

## 2020-01-06 DIAGNOSIS — M62.89 MUSCLE TIGHTNESS: ICD-10-CM

## 2020-01-06 RX ORDER — NARATRIPTAN 2.5 MG/1
TABLET ORAL
Qty: 18 TABLET | Refills: 0 | Status: SHIPPED | OUTPATIENT
Start: 2020-01-06 | End: 2020-03-06

## 2020-01-06 RX ORDER — TIZANIDINE 2 MG/1
TABLET ORAL
Qty: 60 TABLET | Refills: 0 | Status: SHIPPED | OUTPATIENT
Start: 2020-01-06 | End: 2020-04-02

## 2020-01-06 NOTE — TELEPHONE ENCOUNTER
Medication: tiZANidine HCl 2 MG     Date of last refill: 7/22/19 (#60/0)  [place]    Last office visit: 8/12/2019  Due back to clinic per last office note:  3 months  Date next office visit scheduled:    Future Appoin

## 2020-02-03 DIAGNOSIS — G43.701 CHRONIC MIGRAINE WITHOUT AURA WITH STATUS MIGRAINOSUS, NOT INTRACTABLE: ICD-10-CM

## 2020-02-03 DIAGNOSIS — G43.719 INTRACTABLE CHRONIC MIGRAINE WITHOUT AURA AND WITHOUT STATUS MIGRAINOSUS: ICD-10-CM

## 2020-02-03 DIAGNOSIS — I63.9 PERSISTENT MIGRAINE AURA WITH CEREBRAL INFARCTION AND WITHOUT STATUS MIGRAINOSUS, NOT INTRACTABLE (HCC): ICD-10-CM

## 2020-02-03 DIAGNOSIS — G43.609 PERSISTENT MIGRAINE AURA WITH CEREBRAL INFARCTION AND WITHOUT STATUS MIGRAINOSUS, NOT INTRACTABLE (HCC): ICD-10-CM

## 2020-02-03 DIAGNOSIS — G43.519 MIGRAINE AURA, PERSISTENT, INTRACTABLE: ICD-10-CM

## 2020-02-04 RX ORDER — GALCANEZUMAB 120 MG/ML
INJECTION, SOLUTION SUBCUTANEOUS
Qty: 1 ML | Refills: 0 | Status: SHIPPED | OUTPATIENT
Start: 2020-02-04 | End: 2020-03-06

## 2020-02-21 ENCOUNTER — TELEPHONE (OUTPATIENT)
Dept: SURGERY | Facility: CLINIC | Age: 65
End: 2020-02-21

## 2020-02-21 RX ORDER — METHYLPREDNISOLONE 4 MG/1
TABLET ORAL
Qty: 1 PACKAGE | Refills: 0 | Status: SHIPPED | OUTPATIENT
Start: 2020-02-21 | End: 2020-04-16 | Stop reason: ALTCHOICE

## 2020-02-21 NOTE — TELEPHONE ENCOUNTER
Acute Migraine assessment:    Is this your typical migraine? Describe any change in character from past migraines.   Yes:  With neck pain    Location of Pain (select all that apply):  right side and neck  # Days pain has been present:  Every other day for

## 2020-02-21 NOTE — TELEPHONE ENCOUNTER
Pt calling, in the last 3 weeks she has had a migraine/headache & neck pain every single day. Pt has tried heating pad, laying down for 4hrs at a time, etc.  Helps for a little bit.  The muscle relaxer helps but she's so tired, she can't drive or do anythi

## 2020-02-26 NOTE — LETTER
3/18/2024    Carl Alaniz  [address] [place] [address]    Dear Carl,    We would like to inform you that your account has been charged $40 for not showing up to the office for your scheduled appointment on 03/18/2024.    Our no-show policy is as follows: A 24-hour notice is required, or you may be charged a $40 No Show fee.      If you are unable to keep your scheduled appointment, please notify us at least 24 hours in advance so we can accommodate our other patients. You may also reschedule your appointment at that time.    On the third no-show, within a 12-month period, it will be the physician’s discretion as to whether a discharge letter will be sent out disengaging you from the practice and giving you 30 days to enroll with a new non [facility] physician.    If you need any assistance in attending your appointments, please call Patient Experience at [phone] so that we may help you identify possible solutions.    Sincerely,  [facility]  
RHONCHI

## 2020-03-04 ENCOUNTER — TELEPHONE (OUTPATIENT)
Dept: NEUROLOGY | Facility: CLINIC | Age: 65
End: 2020-03-04

## 2020-03-04 DIAGNOSIS — G43.701 CHRONIC MIGRAINE WITHOUT AURA WITH STATUS MIGRAINOSUS, NOT INTRACTABLE: ICD-10-CM

## 2020-03-04 DIAGNOSIS — G43.011 INTRACTABLE MIGRAINE WITHOUT AURA AND WITH STATUS MIGRAINOSUS: ICD-10-CM

## 2020-03-04 DIAGNOSIS — I63.9 PERSISTENT MIGRAINE AURA WITH CEREBRAL INFARCTION AND WITHOUT STATUS MIGRAINOSUS, NOT INTRACTABLE (HCC): ICD-10-CM

## 2020-03-04 DIAGNOSIS — G43.711 INTRACTABLE CHRONIC MIGRAINE WITHOUT AURA AND WITH STATUS MIGRAINOSUS: ICD-10-CM

## 2020-03-04 DIAGNOSIS — G43.519 MIGRAINE AURA, PERSISTENT, INTRACTABLE: ICD-10-CM

## 2020-03-04 DIAGNOSIS — G43.609 PERSISTENT MIGRAINE AURA WITH CEREBRAL INFARCTION AND WITHOUT STATUS MIGRAINOSUS, NOT INTRACTABLE (HCC): ICD-10-CM

## 2020-03-04 DIAGNOSIS — G50.1 ATYPICAL FACIAL PAIN: ICD-10-CM

## 2020-03-04 DIAGNOSIS — G43.719 INTRACTABLE CHRONIC MIGRAINE WITHOUT AURA AND WITHOUT STATUS MIGRAINOSUS: ICD-10-CM

## 2020-03-04 DIAGNOSIS — M54.81 CERVICO-OCCIPITAL NEURALGIA: ICD-10-CM

## 2020-03-06 RX ORDER — GALCANEZUMAB 120 MG/ML
INJECTION, SOLUTION SUBCUTANEOUS
Qty: 1 ML | Refills: 0 | Status: SHIPPED | OUTPATIENT
Start: 2020-03-06 | End: 2020-03-24

## 2020-03-06 RX ORDER — NARATRIPTAN 2.5 MG/1
TABLET ORAL
Qty: 18 TABLET | Refills: 0 | Status: SHIPPED | OUTPATIENT
Start: 2020-03-06 | End: 2020-05-04

## 2020-03-06 NOTE — TELEPHONE ENCOUNTER
LMTCB and schedule an appt for medication refills.        Medication: EMGALITY 120 MG/ML Subcutaneous Solution Auto-injector + NARATRIPTAN HCL 2.5 MG Oral Tab    Date of last refill: 02/04/2020 (#1 mL/0) + 01/06/2020 (#18/0)   [place]

## 2020-03-09 NOTE — TELEPHONE ENCOUNTER
Spoke with pt, informed her that prescriptions have been sent on to her pharmacy, she expressed understanding. Requested pt call office back with further questions or concerns.

## 2020-03-16 ENCOUNTER — TELEPHONE (OUTPATIENT)
Dept: NEUROLOGY | Facility: CLINIC | Age: 65
End: 2020-03-16

## 2020-03-16 NOTE — TELEPHONE ENCOUNTER
Shawn Ambrose for [employer]. Call with update if still not better with these measures and/or after upcoming Emgality injection.

## 2020-03-16 NOTE — TELEPHONE ENCOUNTER
Pt states she doesn't feel like she can drive herself and now she doesn't have anyone to bring her. She will call in the morning if she still wants the injection and has a ride.

## 2020-03-16 NOTE — TELEPHONE ENCOUNTER
Acute Migraine assessment:    Is this your typical migraine? Describe any change in character from past migraines. Yes:     Location of Pain (select all that apply):  neck  # Days pain has been present:  7 days    Describe the pain:  Stabbing and burning  Intensity of the headaches:    With medication: MODERATE   Without medication SEVERE  Associated Symptoms (check all that apply):  Sensitivity to light   Non-pharmaceutical interventions tried and outcome:   [x] Lying down / sleeping:  BETTER  [x] Being in a dark quiet room:  BETTER  [x] Massage your head: BETTER  [x] Cold pack on your head/neck:  not tried  [x] Hot pack on your head/neck:  BETTER  [x] Other:     Abortive Medications tried:  Naratriptan  Current preventative medication list:  Emgality  Any missed doses? No  Any other relevant information:  Patient has freedom't with chiropractor this afternoon and has gotten neck injections in Pain Management. Patient is due for 4th Emgality injection soon but it does not seem to be working. Any medications contraindicated? Tried and failed? Willing to try Medrol Dosepak? No  Last taken:  2/21/20  Willing to try Toradol/Decadron injections? Yes  Last taken:    Advised patient to seek immediate medical treatment in ED for any concerning symptoms or changes to condition.   Yes

## 2020-03-24 DIAGNOSIS — G43.719 INTRACTABLE CHRONIC MIGRAINE WITHOUT AURA AND WITHOUT STATUS MIGRAINOSUS: ICD-10-CM

## 2020-03-24 DIAGNOSIS — G43.609 PERSISTENT MIGRAINE AURA WITH CEREBRAL INFARCTION AND WITHOUT STATUS MIGRAINOSUS, NOT INTRACTABLE (HCC): ICD-10-CM

## 2020-03-24 DIAGNOSIS — G43.701 CHRONIC MIGRAINE WITHOUT AURA WITH STATUS MIGRAINOSUS, NOT INTRACTABLE: ICD-10-CM

## 2020-03-24 DIAGNOSIS — I63.9 PERSISTENT MIGRAINE AURA WITH CEREBRAL INFARCTION AND WITHOUT STATUS MIGRAINOSUS, NOT INTRACTABLE (HCC): ICD-10-CM

## 2020-03-24 DIAGNOSIS — G43.519 MIGRAINE AURA, PERSISTENT, INTRACTABLE: ICD-10-CM

## 2020-03-24 NOTE — TELEPHONE ENCOUNTER
Pt accepted telephone consult for 4/15 at [address] [place] [address] verbally consents to a Virtual/Telephone Check-In service on 04/15/20.   Patient understands and accepts financial responsibility for any deductible, co-insurance and/or co-pays associated w

## 2020-03-25 ENCOUNTER — TELEPHONE (OUTPATIENT)
Dept: NEUROLOGY | Facility: CLINIC | Age: 65
End: 2020-03-25

## 2020-03-25 NOTE — TELEPHONE ENCOUNTER
From alternate charting:    Rubina Santos RN           3/24/20 1:52 PM   Note      Pt accepted telephone consult for 4/15 at [address] verbally consents to a Virtual/Telephone Check-In service on 04/15/20.   Patient understands and accepts

## 2020-04-02 ENCOUNTER — TELEPHONE (OUTPATIENT)
Dept: NEUROLOGY | Facility: CLINIC | Age: 65
End: 2020-04-02

## 2020-04-02 DIAGNOSIS — G50.1 ATYPICAL FACIAL PAIN: Primary | ICD-10-CM

## 2020-04-02 DIAGNOSIS — M62.89 MUSCLE TIGHTNESS: ICD-10-CM

## 2020-04-02 RX ORDER — METHYLPREDNISOLONE 4 MG/1
TABLET ORAL
Qty: 1 PACKAGE | Refills: 0 | Status: SHIPPED | OUTPATIENT
Start: 2020-04-02 | End: 2020-04-16 | Stop reason: ALTCHOICE

## 2020-04-02 RX ORDER — GABAPENTIN 100 MG/1
100 CAPSULE ORAL 2 TIMES DAILY
Qty: 60 CAPSULE | Refills: 2 | Status: SHIPPED | OUTPATIENT
Start: 2020-04-02 | End: 2020-08-07

## 2020-04-02 RX ORDER — TIZANIDINE 2 MG/1
TABLET ORAL
Qty: 60 TABLET | Refills: 0 | Status: SHIPPED | OUTPATIENT
Start: 2020-04-02 | End: 2020-05-26

## 2020-04-02 NOTE — TELEPHONE ENCOUNTER
Ok to proceed with PA for nerve block. Patient can try Gabapentin- would send a new script probably what she has at home . Gabapentin may take time to kick in.  Patient was given Tizanidine before which she can take to relax her neck muscles and NSAI

## 2020-04-02 NOTE — TELEPHONE ENCOUNTER
Referral placed for PA of nerve block. Informed pt of Dr Rosa Loza recommendations. Pt states she needs refill of tizanidine. RX pended for provider signature.

## 2020-04-02 NOTE — TELEPHONE ENCOUNTER
Pt reporting that she has a pinched nerve in neck and last month and a half her migraines have intensified.   She's not getting any relief from emgality, and has been taking narartriptan every other day, which she thinks might be starting to cause rebound h

## 2020-04-06 NOTE — TELEPHONE ENCOUNTER
Spoke to Mathew at 's, Trigeminal/Facial Nerve Block ([id number]) does not require authorization. Call reference #[id number].

## 2020-04-15 ENCOUNTER — VIRTUAL PHONE E/M (OUTPATIENT)
Dept: NEUROLOGY | Facility: CLINIC | Age: 65
End: 2020-04-15
Payer: MEDICARE

## 2020-04-15 DIAGNOSIS — M54.81 CERVICO-OCCIPITAL NEURALGIA: Primary | ICD-10-CM

## 2020-04-15 DIAGNOSIS — G43.701 CHRONIC MIGRAINE WITHOUT AURA WITH STATUS MIGRAINOSUS, NOT INTRACTABLE: ICD-10-CM

## 2020-04-15 NOTE — PROGRESS NOTES
Virtual Telephone Check-In    Montez Petersen verbally consents a Virtual/Telephone Check-In visit on 04/15/20. Patient understands and accepts financial responsibility for any deductible, co-insurance and/or co-pays associated with this service.

## 2020-04-16 ENCOUNTER — OFFICE VISIT (OUTPATIENT)
Dept: NEUROLOGY | Facility: CLINIC | Age: 65
End: 2020-04-16
Payer: MEDICARE

## 2020-04-16 VITALS — DIASTOLIC BLOOD PRESSURE: 84 MMHG | RESPIRATION RATE: 14 BRPM | SYSTOLIC BLOOD PRESSURE: 118 MMHG | HEART RATE: 86 BPM

## 2020-04-16 DIAGNOSIS — M54.81 CERVICO-OCCIPITAL NEURALGIA: ICD-10-CM

## 2020-04-16 DIAGNOSIS — G43.011 INTRACTABLE MIGRAINE WITHOUT AURA AND WITH STATUS MIGRAINOSUS: ICD-10-CM

## 2020-04-16 DIAGNOSIS — M79.18 MYOFASCIAL PAIN: ICD-10-CM

## 2020-04-16 PROCEDURE — 20552 NJX 1/MLT TRIGGER POINT 1/2: CPT | Performed by: OTHER

## 2020-04-16 PROCEDURE — 64400 NJX AA&/STRD TRIGEMINAL NRV: CPT | Performed by: OTHER

## 2020-04-16 RX ORDER — BUPIVACAINE HYDROCHLORIDE 5 MG/ML
4 INJECTION, SOLUTION EPIDURAL; INTRACAUDAL ONCE
Status: COMPLETED | OUTPATIENT
Start: 2020-04-16 | End: 2020-04-16

## 2020-04-16 RX ORDER — AMITRIPTYLINE HYDROCHLORIDE 50 MG/1
50 TABLET, FILM COATED ORAL NIGHTLY
Qty: 30 TABLET | Refills: 2 | Status: SHIPPED | OUTPATIENT
Start: 2020-04-16 | End: 2020-07-13

## 2020-04-16 RX ORDER — LINACLOTIDE 72 UG/1
CAPSULE, GELATIN COATED ORAL AS NEEDED
COMMUNITY
Start: 2020-04-06 | End: 2021-01-29

## 2020-04-16 NOTE — PATIENT INSTRUCTIONS
Refill policies:    • Allow 2-3 business days for refills; controlled substances may take longer.   • Contact your pharmacy at least 5 days prior to running out of medication and have them send an electronic request or submit request through the “request re Depending on your insurance carrier, approval may take 3-10 days. It is highly recommended patients contact their insurance carrier directly to determine coverage.   If test is done without insurance authorization, patient may be responsible for the entire signed prior to proceeding with procedure(s). Furthermore, patient notified that they should contact their insurer to verify that your procedure/test has been approved and is a COVERED benefit.   Although the [facility] staff does its due diligence, the insuran

## 2020-04-16 NOTE — PROCEDURES
Indication: intractable migraine and bilateral temporal pain and left myofascial pain    Procedure: The areas are prepped and cleaned with betadine scrub and alcohol.  Bilateral auriculotemporal nerve blocks and left trapezius and left levator scapulae p

## 2020-05-03 DIAGNOSIS — G43.011 INTRACTABLE MIGRAINE WITHOUT AURA AND WITH STATUS MIGRAINOSUS: ICD-10-CM

## 2020-05-03 DIAGNOSIS — I63.9 PERSISTENT MIGRAINE AURA WITH CEREBRAL INFARCTION AND WITHOUT STATUS MIGRAINOSUS, NOT INTRACTABLE (HCC): ICD-10-CM

## 2020-05-03 DIAGNOSIS — G43.519 MIGRAINE AURA, PERSISTENT, INTRACTABLE: ICD-10-CM

## 2020-05-03 DIAGNOSIS — G43.701 CHRONIC MIGRAINE WITHOUT AURA WITH STATUS MIGRAINOSUS, NOT INTRACTABLE: ICD-10-CM

## 2020-05-03 DIAGNOSIS — M54.81 CERVICO-OCCIPITAL NEURALGIA: ICD-10-CM

## 2020-05-03 DIAGNOSIS — G43.719 INTRACTABLE CHRONIC MIGRAINE WITHOUT AURA AND WITHOUT STATUS MIGRAINOSUS: ICD-10-CM

## 2020-05-03 DIAGNOSIS — G43.711 INTRACTABLE CHRONIC MIGRAINE WITHOUT AURA AND WITH STATUS MIGRAINOSUS: ICD-10-CM

## 2020-05-03 DIAGNOSIS — G50.1 ATYPICAL FACIAL PAIN: ICD-10-CM

## 2020-05-03 DIAGNOSIS — G43.609 PERSISTENT MIGRAINE AURA WITH CEREBRAL INFARCTION AND WITHOUT STATUS MIGRAINOSUS, NOT INTRACTABLE (HCC): ICD-10-CM

## 2020-05-04 RX ORDER — NARATRIPTAN 2.5 MG/1
TABLET ORAL
Qty: 18 TABLET | Refills: 0 | Status: SHIPPED | OUTPATIENT
Start: 2020-05-04 | End: 2020-06-12

## 2020-05-04 NOTE — TELEPHONE ENCOUNTER
Medication: NARATRIPTAN HCL 2.5 MG     Date of last refill: 3/6/20 (#18/0)  [place]    Last office visit: 4/16/2020  Due back to clinic per last office note:  8 weeks  Date next office visit scheduled:  No future appo

## 2020-05-24 DIAGNOSIS — M62.89 MUSCLE TIGHTNESS: ICD-10-CM

## 2020-05-26 RX ORDER — TIZANIDINE 2 MG/1
TABLET ORAL
Qty: 60 TABLET | Refills: 0 | Status: SHIPPED | OUTPATIENT
Start: 2020-05-26 | End: 2020-07-13

## 2020-05-26 NOTE — TELEPHONE ENCOUNTER
Medication: TIZANIDINE HCL 2 MG     Date of last refill: 4/2/20 (#60/0)  [place]    Last office visit: 4/16/2020  Due back to clinic per last office note:  8 weeks   Date next office visit scheduled:  No future appoin

## 2020-06-04 DIAGNOSIS — M62.89 MUSCLE TIGHTNESS: ICD-10-CM

## 2020-06-04 RX ORDER — TIZANIDINE 2 MG/1
TABLET ORAL
Qty: 60 TABLET | Refills: 0 | OUTPATIENT
Start: 2020-06-04

## 2020-06-08 ENCOUNTER — TELEPHONE (OUTPATIENT)
Dept: NEUROLOGY | Facility: CLINIC | Age: 65
End: 2020-06-08

## 2020-06-08 NOTE — TELEPHONE ENCOUNTER
Per PSR:    pt states Naratriptan is no longer working for her migraines; pt states she has been having migraines more frequently at a 9 on the pain scale; pt states she has tried [facility] and that did not work either; pt would like to discuss other option

## 2020-06-11 NOTE — TELEPHONE ENCOUNTER
Pt calling back as she has not yet heard from Dr. Jw Haynes. She wants to have a discussion about other abortive options for migraines. Explained that an OV may be the best way to discuss what she needs.   Accepted appt FOR PHONE CONSULTATION tomorrow, 6/1

## 2020-06-12 ENCOUNTER — VIRTUAL PHONE E/M (OUTPATIENT)
Dept: NEUROLOGY | Facility: CLINIC | Age: 65
End: 2020-06-12

## 2020-06-12 DIAGNOSIS — M54.81 CERVICO-OCCIPITAL NEURALGIA: Primary | ICD-10-CM

## 2020-06-12 DIAGNOSIS — G43.701 CHRONIC MIGRAINE WITHOUT AURA WITH STATUS MIGRAINOSUS, NOT INTRACTABLE: ICD-10-CM

## 2020-06-12 PROCEDURE — 99442 PHONE E/M BY PHYS 11-20 MIN: CPT | Performed by: OTHER

## 2020-06-12 NOTE — PROGRESS NOTES
HPI:    Patient ID: Michelle Zee is a 60year old female. Virtual/Telephone Check-In    Michelle Zee verbally consents a Virtual/Telephone Check-In service on 06/12/20.   Patient has been referred to the [facility] website at [email or web address] t Decreased sexual desire 2/98   • Depression with anxiety     psychiatry: Dr. Chencho Bains   • Diverticulosis 4/06    scattered, minimal   • Endometriosis     s/p ablation therapy   • Fibromyalgia    • History of colon polyps     GI: Dr. Jaramillo Grandchild   • H Performed by Sharon Stoner MD at [facility]   • Juris Landau      endometirosis   • LEG/ANKLE SURGERY [address] UNLISTED  4/18/2012    closed reduction percutaneous screw fixation of right femoral neck fracture  internal fixati BY MOUTH AT ONSET OF HEADACHE, MAY REPEAT IN 4 HOURS( MAXIMUM 2 TABLETS PER DAY) 18 tablet 0   • clonazePAM 0.5 MG Oral Tab TAKE 1/2 TO 1 TABLET BY MOUTH AN HOUR BEFORE BEDTIME AS NEEDED FOR ANXIETY OR INSOMNIA 30 tablet 3   • LINZESS 72 MCG Oral Cap as ne [Topiramate]        Comment:Blurry vision  Alc-Benzyl Alc-Sulf*    RASH  Amoxicillin-Pot Cla*        Comment:Other reaction(s): rash  Bactrim [Sulfametho*    ITCHING  Cefdinir                DIARRHEA    Comment:Abdominal pain  Ciprofloxacin           ITCHI this encounter       Imaging & Referrals:  None       OR#[phone]

## 2020-06-20 DIAGNOSIS — G43.719 INTRACTABLE CHRONIC MIGRAINE WITHOUT AURA AND WITHOUT STATUS MIGRAINOSUS: ICD-10-CM

## 2020-06-20 DIAGNOSIS — G43.701 CHRONIC MIGRAINE WITHOUT AURA WITH STATUS MIGRAINOSUS, NOT INTRACTABLE: ICD-10-CM

## 2020-06-20 DIAGNOSIS — G43.011 INTRACTABLE MIGRAINE WITHOUT AURA AND WITH STATUS MIGRAINOSUS: ICD-10-CM

## 2020-06-20 DIAGNOSIS — M54.81 CERVICO-OCCIPITAL NEURALGIA: ICD-10-CM

## 2020-06-20 DIAGNOSIS — G43.519 MIGRAINE AURA, PERSISTENT, INTRACTABLE: ICD-10-CM

## 2020-06-20 DIAGNOSIS — I63.9 PERSISTENT MIGRAINE AURA WITH CEREBRAL INFARCTION AND WITHOUT STATUS MIGRAINOSUS, NOT INTRACTABLE (HCC): ICD-10-CM

## 2020-06-20 DIAGNOSIS — G43.711 INTRACTABLE CHRONIC MIGRAINE WITHOUT AURA AND WITH STATUS MIGRAINOSUS: ICD-10-CM

## 2020-06-20 DIAGNOSIS — G50.1 ATYPICAL FACIAL PAIN: ICD-10-CM

## 2020-06-20 DIAGNOSIS — G43.609 PERSISTENT MIGRAINE AURA WITH CEREBRAL INFARCTION AND WITHOUT STATUS MIGRAINOSUS, NOT INTRACTABLE (HCC): ICD-10-CM

## 2020-06-22 RX ORDER — NARATRIPTAN 2.5 MG/1
TABLET ORAL
Qty: 18 TABLET | Refills: 0 | OUTPATIENT
Start: 2020-06-22

## 2020-06-24 ENCOUNTER — TELEPHONE (OUTPATIENT)
Dept: NEUROLOGY | Facility: CLINIC | Age: 65
End: 2020-06-24

## 2020-06-24 NOTE — TELEPHONE ENCOUNTER
Per PSR note:    Pt called and states that her ins will not cover medication Rimegepant Sulfate (NURTEC) 75; Pt is requesting other medication.

## 2020-06-27 RX ORDER — FROVATRIPTAN SUCCINATE 2.5 MG/1
TABLET, FILM COATED ORAL
Qty: 12 TABLET | Refills: 0 | Status: SHIPPED | OUTPATIENT
Start: 2020-06-27 | End: 2020-08-24

## 2020-06-30 NOTE — TELEPHONE ENCOUNTER
Spoke with the pt and informed her of new RX. She will try to pick it up and see what her copay would be. She will call if she needs a different option.

## 2020-07-12 DIAGNOSIS — M62.89 MUSCLE TIGHTNESS: ICD-10-CM

## 2020-07-12 DIAGNOSIS — G43.701 CHRONIC MIGRAINE WITHOUT AURA WITH STATUS MIGRAINOSUS, NOT INTRACTABLE: Primary | ICD-10-CM

## 2020-07-13 RX ORDER — AMITRIPTYLINE HYDROCHLORIDE 50 MG/1
TABLET, FILM COATED ORAL
Qty: 30 TABLET | Refills: 2 | Status: SHIPPED | OUTPATIENT
Start: 2020-07-13 | End: 2020-08-07

## 2020-07-13 RX ORDER — TIZANIDINE 2 MG/1
TABLET ORAL
Qty: 60 TABLET | Refills: 2 | Status: SHIPPED | OUTPATIENT
Start: 2020-07-13 | End: 2021-03-01

## 2020-07-13 NOTE — TELEPHONE ENCOUNTER
Medication: Tizanidine & Amitriptyline     Date of last refill: Tizanidine 5/26/2020 for #60/0 additional refills & Amitriptyline on 4/16/2020 for #30/2 additional refills   [place]A    Last office visit: 6/12/2020  D

## 2020-07-21 ENCOUNTER — TELEPHONE (OUTPATIENT)
Dept: NEUROLOGY | Facility: CLINIC | Age: 65
End: 2020-07-21

## 2020-07-21 DIAGNOSIS — G43.701 CHRONIC MIGRAINE WITHOUT AURA WITH STATUS MIGRAINOSUS, NOT INTRACTABLE: Primary | ICD-10-CM

## 2020-07-21 DIAGNOSIS — G43.719 INTRACTABLE CHRONIC MIGRAINE WITHOUT AURA AND WITHOUT STATUS MIGRAINOSUS: ICD-10-CM

## 2020-07-21 DIAGNOSIS — M54.81 CERVICO-OCCIPITAL NEURALGIA: ICD-10-CM

## 2020-07-21 DIAGNOSIS — R51.9 WORSENING HEADACHES: ICD-10-CM

## 2020-07-21 RX ORDER — METHYLPREDNISOLONE 4 MG/1
TABLET ORAL
Qty: 1 PACKAGE | Refills: 0 | Status: SHIPPED | OUTPATIENT
Start: 2020-07-21 | End: 2020-08-07 | Stop reason: ALTCHOICE

## 2020-07-21 NOTE — TELEPHONE ENCOUNTER
Acute Migraine assessment:    Is this your typical migraine? Describe any change in character from past migraines.   Yes: but occipital area feels full and tight    Location of Pain (select all that apply):  neck and goes up to top of head  # Days pain has

## 2020-07-21 NOTE — TELEPHONE ENCOUNTER
Rx Medrol Dose Pack sent to [facility] by Dr Joyce Andrews and patient notified.     [facility] 2 hours ago (12:51 PM)      Signed    Routing comment

## 2020-07-29 NOTE — TELEPHONE ENCOUNTER
pt finished medrol dose pack yesterday and has not had relief, she had a migraine yesterday but she mostly has a pinching, burning sensation in her neck and goes around to her head and her left temple and eye.   She has gone to the chiropractor, feels she m

## 2020-07-29 NOTE — TELEPHONE ENCOUNTER
Had MRI C spine 2 years ago and shows mild DJD and foraminal stenosis  Would recommend MRI brain w and wo contrast

## 2020-07-30 NOTE — TELEPHONE ENCOUNTER
MRI Brain w/wo order placed per written order. Message left on  that MRI order placed and she can schedule freedom't after hearing from PA Dept that it has been approved by her insurance.     Pepper Castillo MD 15 hours ago (4:52 PM)         Had MRI C sp

## 2020-07-31 NOTE — TELEPHONE ENCOUNTER
Spoke with patient and informed her Dr Bhavani Cross will be in clinic on Monday and she will get response then. Patient verbalized understanding and states she still has neck pain but no headache at this time.

## 2020-07-31 NOTE — TELEPHONE ENCOUNTER
Spoke with patient regarding MRI Authorization, per patient she would like to know if Brain MRI is really needed as she is having more neck pain rather than migraine. Patient mentioned she has seen Francisca Yee in the past for injections.

## 2020-08-03 ENCOUNTER — TELEPHONE (OUTPATIENT)
Dept: NEUROLOGY | Facility: CLINIC | Age: 65
End: 2020-08-03

## 2020-08-03 DIAGNOSIS — M54.81 CERVICO-OCCIPITAL NEURALGIA: Primary | ICD-10-CM

## 2020-08-03 NOTE — TELEPHONE ENCOUNTER
Per PSR note:  Pt wants to get an injection for pain in neck. Pt only wants to talk to Dr. Lo Carrion.   Pt wants an appt for an injection asap

## 2020-08-04 ENCOUNTER — TELEPHONE (OUTPATIENT)
Dept: NEUROLOGY | Facility: CLINIC | Age: 65
End: 2020-08-04

## 2020-08-05 NOTE — TELEPHONE ENCOUNTER
Called [facility] Medicare Advantage plan [phone] and spoke with [facility]. Codes [id number] are valid and billable no authorization is required. Call reference #[id number].  Time on call 11:17

## 2020-08-07 ENCOUNTER — OFFICE VISIT (OUTPATIENT)
Dept: NEUROLOGY | Facility: CLINIC | Age: 65
End: 2020-08-07
Payer: MEDICARE

## 2020-08-07 VITALS
DIASTOLIC BLOOD PRESSURE: 74 MMHG | BODY MASS INDEX: 29 KG/M2 | RESPIRATION RATE: 16 BRPM | WEIGHT: 183 LBS | SYSTOLIC BLOOD PRESSURE: 128 MMHG | HEART RATE: 76 BPM

## 2020-08-07 DIAGNOSIS — M54.12 CERVICAL RADICULITIS: ICD-10-CM

## 2020-08-07 DIAGNOSIS — M79.18 MYOFASCIAL PAIN ON LEFT SIDE: ICD-10-CM

## 2020-08-07 DIAGNOSIS — G43.019 INTRACTABLE MIGRAINE WITHOUT AURA AND WITHOUT STATUS MIGRAINOSUS: ICD-10-CM

## 2020-08-07 DIAGNOSIS — M54.81 CERVICO-OCCIPITAL NEURALGIA: ICD-10-CM

## 2020-08-07 PROCEDURE — 64400 NJX AA&/STRD TRIGEMINAL NRV: CPT | Performed by: OTHER

## 2020-08-07 PROCEDURE — 20552 NJX 1/MLT TRIGGER POINT 1/2: CPT | Performed by: OTHER

## 2020-08-07 PROCEDURE — 3078F DIAST BP <80 MM HG: CPT | Performed by: OTHER

## 2020-08-07 PROCEDURE — 3074F SYST BP LT 130 MM HG: CPT | Performed by: OTHER

## 2020-08-07 PROCEDURE — 99212 OFFICE O/P EST SF 10 MIN: CPT | Performed by: OTHER

## 2020-08-07 PROCEDURE — 64405 NJX AA&/STRD GR OCPL NRV: CPT | Performed by: OTHER

## 2020-08-07 RX ORDER — GLUCOSAMINE HCL 500 MG
100 TABLET ORAL DAILY
COMMUNITY
Start: 2020-08-05 | End: 2021-06-08

## 2020-08-07 RX ORDER — ATORVASTATIN CALCIUM 10 MG/1
10 TABLET, FILM COATED ORAL DAILY
COMMUNITY
Start: 2020-08-05 | End: 2020-10-22

## 2020-08-07 RX ORDER — BUPIVACAINE HYDROCHLORIDE 5 MG/ML
4 INJECTION, SOLUTION PERINEURAL ONCE
Status: COMPLETED | OUTPATIENT
Start: 2020-08-07 | End: 2020-08-07

## 2020-08-07 RX ORDER — AMITRIPTYLINE HYDROCHLORIDE 75 MG/1
75 TABLET, FILM COATED ORAL NIGHTLY
Qty: 30 TABLET | Refills: 2 | Status: SHIPPED | OUTPATIENT
Start: 2020-08-07 | End: 2020-10-30

## 2020-08-07 RX ORDER — TRIAMCINOLONE ACETONIDE 40 MG/ML
40 INJECTION, SUSPENSION INTRA-ARTICULAR; INTRAMUSCULAR ONCE
Status: COMPLETED | OUTPATIENT
Start: 2020-08-07 | End: 2020-08-07

## 2020-08-07 NOTE — PROGRESS NOTES
Patient states left shoulder blade pain and into her arm and neck. Patient is also having pain on her bilateral temples. Patient states she did have a migraine last night.

## 2020-08-07 NOTE — PROGRESS NOTES
HPI:    Patient ID: Victorino Cerrato is a 60year old female. Migraine        Patient is a 60year old female with history of chronic migraines and cervical occipital pain.   States headaches have increased and in the past 2 week had frequent migraines scattered, minimal   • Endometriosis     s/p ablation therapy   • Fibromyalgia    • History of colon polyps     GI: Dr. Radha Trujillo   • History of femur fracture 04/18/2012    s/p closed reduction percutaneous screw fixation of right femoral neck frac • LEG/ANKLE SURGERY PROC UNLISTED  4/18/2012    closed reduction percutaneous screw fixation of right femoral neck fracture  internal fixation of the right hip   • OTHER  April 2016    Cervical Facet injection, Ganglion block   • Baljit Wheeler BEDTIME AS NEEDED FOR SLEEP OR ANXIETY. MAY ALSO TAKE 1/2 TO 1 TABLET BY MOUTH EVERY DAY AS NEEDED 30 tablet 0   • Frovatriptan Succinate 2.5 MG Oral Tab Use at onset; may repeat once after 4 hours- ONLY 2 IN 24 HOUR PERIOD MAX. This is a 30 day supply.  1 vision  Alc-Benzyl Alc-Sulf*    RASH  Amoxicillin-Pot Cla*        Comment:Other reaction(s): rash  Bactrim [Sulfametho*    ITCHING  Cefdinir                DIARRHEA    Comment:Abdominal pain  Ciprofloxacin           ITCHING  Citric Acid             OTHER ( 5/5 bilaterally. Finger-to-nose coordination is intact. Gait brisk and steady.               ASSESSMENT/PLAN:   (M54.81) Cervico-occipital neuralgia  (primary encounter diagnosis)    (M54.12) Cervical radiculitis      (G43.019) Intractable migraine without

## 2020-08-07 NOTE — PROCEDURES
Indication: Intractable left sided migraine- temporal and occipital headache  Myofascial pain    Procedure: The areas are prepped and cleaned with betadine scrub and alcohol.  Left auriculotemporal and occipital nerve block performed using a mixture of 2.5

## 2020-08-21 DIAGNOSIS — G43.019 INTRACTABLE MIGRAINE WITHOUT AURA AND WITHOUT STATUS MIGRAINOSUS: Primary | ICD-10-CM

## 2020-08-24 RX ORDER — FROVATRIPTAN SUCCINATE 2.5 MG/1
TABLET, FILM COATED ORAL
Qty: 12 TABLET | Refills: 0 | Status: SHIPPED | OUTPATIENT
Start: 2020-08-24 | End: 2020-10-22 | Stop reason: ALTCHOICE

## 2020-08-24 NOTE — TELEPHONE ENCOUNTER
Medication: FROVATRIPTAN SUCCINATE 2.5 MG Oral Tab    Date of last refill: 06/27/2020 (#12/0)  [place]    Last office visit: 8/7/2020  Due back to clinic per last office note:  Around 11/07/2020  Date next office vis

## 2020-08-31 DIAGNOSIS — G43.701 CHRONIC MIGRAINE WITHOUT AURA WITH STATUS MIGRAINOSUS, NOT INTRACTABLE: ICD-10-CM

## 2020-08-31 DIAGNOSIS — G43.711 INTRACTABLE CHRONIC MIGRAINE WITHOUT AURA AND WITH STATUS MIGRAINOSUS: ICD-10-CM

## 2020-08-31 DIAGNOSIS — G43.609 PERSISTENT MIGRAINE AURA WITH CEREBRAL INFARCTION AND WITHOUT STATUS MIGRAINOSUS, NOT INTRACTABLE (HCC): ICD-10-CM

## 2020-08-31 DIAGNOSIS — M54.81 CERVICO-OCCIPITAL NEURALGIA: ICD-10-CM

## 2020-08-31 DIAGNOSIS — G50.1 ATYPICAL FACIAL PAIN: ICD-10-CM

## 2020-08-31 DIAGNOSIS — G43.011 INTRACTABLE MIGRAINE WITHOUT AURA AND WITH STATUS MIGRAINOSUS: ICD-10-CM

## 2020-08-31 DIAGNOSIS — G43.719 INTRACTABLE CHRONIC MIGRAINE WITHOUT AURA AND WITHOUT STATUS MIGRAINOSUS: ICD-10-CM

## 2020-08-31 DIAGNOSIS — G43.519 MIGRAINE AURA, PERSISTENT, INTRACTABLE: ICD-10-CM

## 2020-08-31 DIAGNOSIS — I63.9 PERSISTENT MIGRAINE AURA WITH CEREBRAL INFARCTION AND WITHOUT STATUS MIGRAINOSUS, NOT INTRACTABLE (HCC): ICD-10-CM

## 2020-09-01 RX ORDER — NARATRIPTAN 2.5 MG/1
TABLET ORAL
Qty: 18 TABLET | Refills: 0 | Status: SHIPPED | OUTPATIENT
Start: 2020-09-01 | End: 2020-09-03

## 2020-09-01 NOTE — TELEPHONE ENCOUNTER
Called patient to discuss RX - per Epic review, Naratriptan on allergy list.  Patient reports it is not the medication, but a dye in a particular brand. Patient wishes to continue with RX as she is unable to afford frovatriptan at this time.     Patient re

## 2020-09-03 RX ORDER — NARATRIPTAN 2.5 MG/1
TABLET ORAL
Qty: 18 TABLET | Refills: 0 | Status: SHIPPED | OUTPATIENT
Start: 2020-09-03 | End: 2020-10-29

## 2020-09-03 NOTE — TELEPHONE ENCOUNTER
Rx Naratriptan was sent to [facility] instead of [facility] per Epic review. Spoke with Renaldo Riggs at . [address] [address] and cancelled 9/1/20 Naratriptan Rx. Rx Naratriptan sent to [facility].

## 2020-09-04 PROBLEM — M46.92 UNSPECIFIED INFLAMMATORY SPONDYLOPATHY, CERVICAL REGION: Status: ACTIVE | Noted: 2020-09-04

## 2020-09-04 PROBLEM — M46.92 UNSPECIFIED INFLAMMATORY SPONDYLOPATHY, CERVICAL REGION (HCC): Status: ACTIVE | Noted: 2020-09-04

## 2020-10-05 ENCOUNTER — TELEPHONE (OUTPATIENT)
Dept: NEUROLOGY | Facility: CLINIC | Age: 65
End: 2020-10-05

## 2020-10-05 DIAGNOSIS — G43.701 CHRONIC MIGRAINE WITHOUT AURA WITH STATUS MIGRAINOSUS, NOT INTRACTABLE: Primary | ICD-10-CM

## 2020-10-05 NOTE — TELEPHONE ENCOUNTER
S: Patient would like to come for TPI. B: Last TPI on 08/07/2020 - Codes valid and billable 0n 8/5/2020         [facility] [place] Medicare Advantage plan [phone] and spoke with [facility].  Codes [id number] are valid and billable no authorization

## 2020-10-09 NOTE — TELEPHONE ENCOUNTER
Called [employer] and spoke with Mynor Powell for code [id number] no authorization is required. Call reference #[id number].  TIME ON ALL 8:01

## 2020-10-28 DIAGNOSIS — I63.9 PERSISTENT MIGRAINE AURA WITH CEREBRAL INFARCTION AND WITHOUT STATUS MIGRAINOSUS, NOT INTRACTABLE (HCC): ICD-10-CM

## 2020-10-28 DIAGNOSIS — G43.719 INTRACTABLE CHRONIC MIGRAINE WITHOUT AURA AND WITHOUT STATUS MIGRAINOSUS: ICD-10-CM

## 2020-10-28 DIAGNOSIS — G43.011 INTRACTABLE MIGRAINE WITHOUT AURA AND WITH STATUS MIGRAINOSUS: ICD-10-CM

## 2020-10-28 DIAGNOSIS — G43.711 INTRACTABLE CHRONIC MIGRAINE WITHOUT AURA AND WITH STATUS MIGRAINOSUS: ICD-10-CM

## 2020-10-28 DIAGNOSIS — G43.701 CHRONIC MIGRAINE WITHOUT AURA WITH STATUS MIGRAINOSUS, NOT INTRACTABLE: ICD-10-CM

## 2020-10-28 DIAGNOSIS — G50.1 ATYPICAL FACIAL PAIN: ICD-10-CM

## 2020-10-28 DIAGNOSIS — G43.519 MIGRAINE AURA, PERSISTENT, INTRACTABLE: ICD-10-CM

## 2020-10-28 DIAGNOSIS — G43.609 PERSISTENT MIGRAINE AURA WITH CEREBRAL INFARCTION AND WITHOUT STATUS MIGRAINOSUS, NOT INTRACTABLE (HCC): ICD-10-CM

## 2020-10-28 DIAGNOSIS — M54.81 CERVICO-OCCIPITAL NEURALGIA: ICD-10-CM

## 2020-10-29 RX ORDER — NARATRIPTAN 2.5 MG/1
TABLET ORAL
Qty: 18 TABLET | Refills: 0 | Status: SHIPPED | OUTPATIENT
Start: 2020-10-29 | End: 2020-12-08

## 2020-10-29 NOTE — TELEPHONE ENCOUNTER
Medication: NARATRIPTAN HCL 2.5 MG Oral Tab    Date of last refill: 09/03/2020 (#18/0)  [place]    Last office visit: 08/07/2020  Due back to clinic per last office note:  Around 11/07/2020  Date next office visit sc

## 2020-10-30 ENCOUNTER — TELEPHONE (OUTPATIENT)
Dept: NEUROLOGY | Facility: CLINIC | Age: 65
End: 2020-10-30

## 2020-10-30 ENCOUNTER — OFFICE VISIT (OUTPATIENT)
Dept: NEUROLOGY | Facility: CLINIC | Age: 65
End: 2020-10-30
Payer: MEDICARE

## 2020-10-30 VITALS
HEART RATE: 90 BPM | BODY MASS INDEX: 30 KG/M2 | SYSTOLIC BLOOD PRESSURE: 110 MMHG | DIASTOLIC BLOOD PRESSURE: 82 MMHG | WEIGHT: 180 LBS | RESPIRATION RATE: 18 BRPM

## 2020-10-30 DIAGNOSIS — G43.609 PERSISTENT MIGRAINE AURA WITH CEREBRAL INFARCTION AND WITHOUT STATUS MIGRAINOSUS, NOT INTRACTABLE (HCC): ICD-10-CM

## 2020-10-30 DIAGNOSIS — M54.81 CERVICO-OCCIPITAL NEURALGIA: ICD-10-CM

## 2020-10-30 DIAGNOSIS — G43.519 MIGRAINE AURA, PERSISTENT, INTRACTABLE: ICD-10-CM

## 2020-10-30 DIAGNOSIS — M79.7 FIBROMYALGIA: ICD-10-CM

## 2020-10-30 DIAGNOSIS — I63.9 PERSISTENT MIGRAINE AURA WITH CEREBRAL INFARCTION AND WITHOUT STATUS MIGRAINOSUS, NOT INTRACTABLE (HCC): ICD-10-CM

## 2020-10-30 DIAGNOSIS — M79.18 MYOFASCIAL PAIN ON LEFT SIDE: ICD-10-CM

## 2020-10-30 DIAGNOSIS — G43.701 CHRONIC MIGRAINE WITHOUT AURA WITH STATUS MIGRAINOSUS, NOT INTRACTABLE: Primary | ICD-10-CM

## 2020-10-30 DIAGNOSIS — G43.711 INTRACTABLE CHRONIC MIGRAINE WITHOUT AURA AND WITH STATUS MIGRAINOSUS: ICD-10-CM

## 2020-10-30 PROCEDURE — 64405 NJX AA&/STRD GR OCPL NRV: CPT | Performed by: OTHER

## 2020-10-30 PROCEDURE — 99212 OFFICE O/P EST SF 10 MIN: CPT | Performed by: OTHER

## 2020-10-30 PROCEDURE — 20552 NJX 1/MLT TRIGGER POINT 1/2: CPT | Performed by: OTHER

## 2020-10-30 PROCEDURE — 3074F SYST BP LT 130 MM HG: CPT | Performed by: OTHER

## 2020-10-30 PROCEDURE — 3079F DIAST BP 80-89 MM HG: CPT | Performed by: OTHER

## 2020-10-30 RX ORDER — AMITRIPTYLINE HYDROCHLORIDE 50 MG/1
50 TABLET, FILM COATED ORAL NIGHTLY
Qty: 30 TABLET | Refills: 1 | Status: SHIPPED | OUTPATIENT
Start: 2020-10-30 | End: 2021-04-12

## 2020-10-30 RX ORDER — BUPIVACAINE HYDROCHLORIDE 5 MG/ML
2 INJECTION, SOLUTION EPIDURAL; INTRACAUDAL ONCE
Status: DISCONTINUED | OUTPATIENT
Start: 2020-10-30 | End: 2020-10-30

## 2020-10-30 RX ORDER — BUPIVACAINE HYDROCHLORIDE 5 MG/ML
INJECTION, SOLUTION PERINEURAL ONCE
Status: COMPLETED | OUTPATIENT
Start: 2020-10-30 | End: 2020-10-30

## 2020-10-30 RX ORDER — TRIAMCINOLONE ACETONIDE 40 MG/ML
40 INJECTION, SUSPENSION INTRA-ARTICULAR; INTRAMUSCULAR ONCE
Status: COMPLETED | OUTPATIENT
Start: 2020-10-30 | End: 2020-10-30

## 2020-10-30 NOTE — PROGRESS NOTES
HPI:    Patient ID: Victorino Cerrato is a 63year old female. Migraine         Patient is a 60year old female with history of chronic migraines and cervical occipital pain.   States headaches have increased and in the past 2 week had frequent migraines the thecal sax with a small annular fissure in the posterior annular fibers   • Migraines     neuro: Dr. Markie Peacock   • Nephrolithiasis    • Osteopenia 5/31/12   • Palpitations 2/98    likely anxiety related to dystrophic nails secondary to mechanica BURSA INJECTION RIGHT OR LEFT Left 7/1/2014    Performed by Marly Katz MD at [facility] MAIN OR   • TUBAL LIGATION        Family History   Problem Relation Age of Onset   • Cancer Mother         SKIN ?  TYPE   • Lung Disorder Mother         COPD, emphysema differently: Take 100 mg by mouth as needed.  ) 30 tablet 2   • Amitriptyline HCl 75 MG Oral Tab Take 1 tablet (75 mg total) by mouth nightly.  30 tablet 2   • TIZANIDINE HCL 2 MG Oral Tab TAKE 1 TABLET BY MOUTH TWICE DAILY AS NEEDED 60 tablet 2   • LINZESS pain med, Norco, Oxycodone, etc.  Peppermint Flavor         Reglan [Metoclopram*    PALPITATIONS, OTHER (SEE COMMENTS)    Comment:Hypertension with Reglan administration via IV  Seasonal                  Tomato                  OTHER (SEE COMMENTS)  Zomig % injection, DISCONTINUED: bupivacaine PF         (MARCAINE) 0.5% injection       MRI brain still pending    We will do occipital nerve block and as well as TPI in the left trapezius.     Discuss regarding Amitriptyline and concern for serotonin syndrome wi

## 2020-10-30 NOTE — PROCEDURES
Indication: Myofascial pain, left occipital neuralgia    Procedure: The areas are prepped and cleaned with betadine scrub and alcohol.  left occipital nerve block and TPI injections performed in the bilateral trapezius performed using a mixture of 2 ml 0.5%

## 2020-11-07 DIAGNOSIS — G43.719 INTRACTABLE CHRONIC MIGRAINE WITHOUT AURA AND WITHOUT STATUS MIGRAINOSUS: ICD-10-CM

## 2020-11-07 DIAGNOSIS — G43.701 CHRONIC MIGRAINE WITHOUT AURA WITH STATUS MIGRAINOSUS, NOT INTRACTABLE: ICD-10-CM

## 2020-11-09 RX ORDER — METHYLPREDNISOLONE 4 MG/1
TABLET ORAL
Qty: 21 EACH | Refills: 0 | Status: SHIPPED | OUTPATIENT
Start: 2020-11-09 | End: 2021-01-29 | Stop reason: ALTCHOICE

## 2020-11-09 NOTE — TELEPHONE ENCOUNTER
Acute Migraine assessment:    Is this your typical migraine? Describe any change in character from past migraines.   Yes:     Location of Pain (select all that apply):  temple, back occipital  # Days pain has been present:  7 days on and off    Describe th

## 2020-11-10 NOTE — TELEPHONE ENCOUNTER
[employer] medicare advantage plan denied Botox . Patient must try and fail 1 more drug . Patient needs to try Maci Vogel (which might need a

## 2020-11-24 NOTE — TELEPHONE ENCOUNTER
Received a fax from [address] [place] [address]    Bot approved for a formulary exception     Approval #[id number] from 8/29/20-11/24/21

## 2020-11-24 NOTE — TELEPHONE ENCOUNTER
Spoke to patient and explained the processes of Botox going forward. She wants to talk to her insurance first before she decides if she will have this done. She will call us back once she talks to her insurance.

## 2020-12-06 ENCOUNTER — HOSPITAL ENCOUNTER (OUTPATIENT)
Age: 65
Discharge: HOME | End: 2020-12-06
Payer: MEDICARE

## 2020-12-06 VITALS
WEIGHT: 175 LBS | OXYGEN SATURATION: 97 % | HEIGHT: 65 IN | BODY MASS INDEX: 29.16 KG/M2 | DIASTOLIC BLOOD PRESSURE: 92 MMHG | HEART RATE: 88 BPM | RESPIRATION RATE: 18 BRPM | TEMPERATURE: 98 F | SYSTOLIC BLOOD PRESSURE: 134 MMHG

## 2020-12-06 DIAGNOSIS — N89.8 VAGINAL DISCHARGE: Primary | ICD-10-CM

## 2020-12-06 PROCEDURE — 99203 OFFICE O/P NEW LOW 30 MIN: CPT | Performed by: PHYSICIAN ASSISTANT

## 2020-12-06 PROCEDURE — 81002 URINALYSIS NONAUTO W/O SCOPE: CPT | Performed by: PHYSICIAN ASSISTANT

## 2020-12-06 RX ORDER — FLUCONAZOLE 150 MG/1
150 TABLET ORAL ONCE
Qty: 1 TABLET | Refills: 0 | Status: SHIPPED | OUTPATIENT
Start: 2020-12-06 | End: 2020-12-06

## 2020-12-06 NOTE — ED INITIAL ASSESSMENT (HPI)
Pt states that for one week she has been very irritated in the vaginal area. Pt states that she was in contact with her Gyne. Pt also c/o burning on urination.  Pt did use monistat cream this morning and states that the cream caused an increase in irritatio

## 2020-12-06 NOTE — ED PROVIDER NOTES
Patient Seen in: [address]      History   Patient presents with:  Eval-G    Stated Complaint: eval g     HPI    32-year-old female presents with 1 week of vaginal irritation and discharge. Patient reports mild vaginal itching.   Tri mechanical injury    • Postmenopausal HRT (hormone replacement therapy)    • Seasonal allergies    • Vitamin D deficiency 3/4/2019              Past Surgical History:   Procedure Laterality Date   • CERVICAL FACET INJECTION Left 4/14/2016    Performed by Naye Pickens History    Tobacco Use      Smoking status: Never Smoker      Smokeless tobacco: Never Used      Tobacco comment: 20 yrs of second hand smoke, mother smoked    Alcohol use:  Yes      Alcohol/week: 0.0 - 1.0 standard drinks      Comment: occasional    Drug u All other components within normal limits   VAGINITIS/VAGINOSIS, DNA PROBE   URINE CULTURE, ROUTINE                  MDM      79-year-old female presents with vaginal irritation and burning sensation.   Large amount of Monistat as noted in vaginal vault,

## 2020-12-08 DIAGNOSIS — I63.9 PERSISTENT MIGRAINE AURA WITH CEREBRAL INFARCTION AND WITHOUT STATUS MIGRAINOSUS, NOT INTRACTABLE (HCC): ICD-10-CM

## 2020-12-08 DIAGNOSIS — M54.81 CERVICO-OCCIPITAL NEURALGIA: ICD-10-CM

## 2020-12-08 DIAGNOSIS — G43.719 INTRACTABLE CHRONIC MIGRAINE WITHOUT AURA AND WITHOUT STATUS MIGRAINOSUS: ICD-10-CM

## 2020-12-08 DIAGNOSIS — G43.609 PERSISTENT MIGRAINE AURA WITH CEREBRAL INFARCTION AND WITHOUT STATUS MIGRAINOSUS, NOT INTRACTABLE (HCC): ICD-10-CM

## 2020-12-08 DIAGNOSIS — G50.1 ATYPICAL FACIAL PAIN: ICD-10-CM

## 2020-12-08 DIAGNOSIS — G43.519 MIGRAINE AURA, PERSISTENT, INTRACTABLE: ICD-10-CM

## 2020-12-08 DIAGNOSIS — G43.011 INTRACTABLE MIGRAINE WITHOUT AURA AND WITH STATUS MIGRAINOSUS: ICD-10-CM

## 2020-12-08 DIAGNOSIS — G43.701 CHRONIC MIGRAINE WITHOUT AURA WITH STATUS MIGRAINOSUS, NOT INTRACTABLE: ICD-10-CM

## 2020-12-08 DIAGNOSIS — G43.711 INTRACTABLE CHRONIC MIGRAINE WITHOUT AURA AND WITH STATUS MIGRAINOSUS: ICD-10-CM

## 2020-12-08 RX ORDER — NARATRIPTAN 2.5 MG/1
TABLET ORAL
Qty: 18 TABLET | Refills: 0 | Status: SHIPPED | OUTPATIENT
Start: 2020-12-08 | End: 2021-01-18

## 2020-12-08 NOTE — TELEPHONE ENCOUNTER
Medication: NARATRIPTAN HCL 2.5 MG     Date of last refill: 10/29/20 (#18/0)  [place]     Last office visit: 10/30/20  Due back to clinic per last office note:  3 months  Date next office visit scheduled:    Future Appoi

## 2020-12-08 NOTE — ED NOTES
Attempted to notify pt of negative urine culture and vaginal panel results. Message left for patient to call back. Results released to Fausto Dandy.

## 2020-12-12 ENCOUNTER — HOSPITAL ENCOUNTER (OUTPATIENT)
Dept: MAMMOGRAPHY | Age: 65
Discharge: HOME | End: 2020-12-12
Attending: OBSTETRICS & GYNECOLOGY
Payer: MEDICARE

## 2020-12-12 DIAGNOSIS — Z12.31 ENCOUNTER FOR SCREENING MAMMOGRAM FOR MALIGNANT NEOPLASM OF BREAST: ICD-10-CM

## 2020-12-12 PROCEDURE — 77067 SCR MAMMO BI INCL CAD: CPT | Performed by: OBSTETRICS & GYNECOLOGY

## 2020-12-12 PROCEDURE — 77063 BREAST TOMOSYNTHESIS BI: CPT | Performed by: OBSTETRICS & GYNECOLOGY

## 2021-01-15 DIAGNOSIS — G43.701 CHRONIC MIGRAINE WITHOUT AURA WITH STATUS MIGRAINOSUS, NOT INTRACTABLE: ICD-10-CM

## 2021-01-15 DIAGNOSIS — G43.711 INTRACTABLE CHRONIC MIGRAINE WITHOUT AURA AND WITH STATUS MIGRAINOSUS: ICD-10-CM

## 2021-01-15 DIAGNOSIS — G43.519 MIGRAINE AURA, PERSISTENT, INTRACTABLE: ICD-10-CM

## 2021-01-15 DIAGNOSIS — I63.9 PERSISTENT MIGRAINE AURA WITH CEREBRAL INFARCTION AND WITHOUT STATUS MIGRAINOSUS, NOT INTRACTABLE (HCC): ICD-10-CM

## 2021-01-15 DIAGNOSIS — G43.719 INTRACTABLE CHRONIC MIGRAINE WITHOUT AURA AND WITHOUT STATUS MIGRAINOSUS: ICD-10-CM

## 2021-01-15 DIAGNOSIS — G43.609 PERSISTENT MIGRAINE AURA WITH CEREBRAL INFARCTION AND WITHOUT STATUS MIGRAINOSUS, NOT INTRACTABLE (HCC): ICD-10-CM

## 2021-01-15 DIAGNOSIS — M54.81 CERVICO-OCCIPITAL NEURALGIA: ICD-10-CM

## 2021-01-15 DIAGNOSIS — G43.011 INTRACTABLE MIGRAINE WITHOUT AURA AND WITH STATUS MIGRAINOSUS: ICD-10-CM

## 2021-01-15 DIAGNOSIS — G50.1 ATYPICAL FACIAL PAIN: ICD-10-CM

## 2021-01-15 NOTE — TELEPHONE ENCOUNTER
Medication: NARATRIPTAN HCL 2.5 MG Oral Tab    Date of last refill: 12/08/2020 (#18/0)  [place]    Last office visit: 10/30/2020  Due back to clinic per last office note:  Around 01/30/2021  Date next office visit sc

## 2021-01-18 RX ORDER — NARATRIPTAN 2.5 MG/1
TABLET ORAL
Qty: 18 TABLET | Refills: 0 | Status: SHIPPED | OUTPATIENT
Start: 2021-01-18 | End: 2021-02-17

## 2021-01-29 ENCOUNTER — TELEMEDICINE (OUTPATIENT)
Dept: NEUROLOGY | Facility: CLINIC | Age: 66
End: 2021-01-29
Payer: MEDICARE

## 2021-01-29 DIAGNOSIS — G43.701 CHRONIC MIGRAINE WITHOUT AURA WITH STATUS MIGRAINOSUS, NOT INTRACTABLE: Primary | ICD-10-CM

## 2021-01-29 DIAGNOSIS — M79.18 MYOFASCIAL PAIN ON LEFT SIDE: ICD-10-CM

## 2021-01-29 PROCEDURE — 99213 OFFICE O/P EST LOW 20 MIN: CPT | Performed by: OTHER

## 2021-01-30 NOTE — PROGRESS NOTES
HPI:    Patient ID: Anna Marie Bowles is a 63year old female. Migraine         This visit is conducted using Telemedicine with live, interactive video and audio.     Patient has been referred to the [facility] website at [email or web address] to review the central protrusion effacing the thecal sax with a small annular fissure in the posterior annular fibers   • Migraines     neuro: Dr. Dena Dunaway   • Nephrolithiasis    • Osteopenia 5/31/12   • Palpitations 2/98    likely anxiety related to dystrophic GALLBLADDER     • SUBDELTOID BURSA INJECTION RIGHT OR LEFT Left 7/1/2014    Performed by Katie Perez MD at [facility] MAIN OR   • TUBAL LIGATION        Family History   Problem Relation Age of Onset   • Cancer Mother         SKIN ?  TYPE   • Lung Disorder Mot Take 1 tablet (20 mg total) by mouth 4 (four) times daily before meals and nightly. Prn abdominal pain (Patient taking differently: Take 20 mg by mouth as needed.  Prn abdominal pain ) 120 tablet 5   • Montelukast Sodium 10 MG Oral Tab Take 10 mg by mouth n performed  Lung: not performed  NEUROLOGICAL: This patient is alert and orientated x 3. Speech is fluent with intact comprehension. CN 2-12: grossly intact.  Motor- intact  Gait: not tested           ASSESSMENT/PLAN:   (G43.701) Chronic migraine without aur

## 2021-02-08 ENCOUNTER — TELEPHONE (OUTPATIENT)
Dept: NEUROLOGY | Facility: CLINIC | Age: 66
End: 2021-02-08

## 2021-02-08 NOTE — TELEPHONE ENCOUNTER
pt states Botox was discussed at last Telemedicine visit on 1/29/21; pt would like to know the status of the Botox PA; pls call

## 2021-02-11 ENCOUNTER — PATIENT MESSAGE (OUTPATIENT)
Dept: FAMILY MEDICINE CLINIC | Facility: CLINIC | Age: 66
End: 2021-02-11

## 2021-02-12 NOTE — TELEPHONE ENCOUNTER
From: Israel Thomas  To:  Oxana Mei MD  Sent: 2/11/2021 8:57 AM CST  Subject: Prescription Question    Hello I need a special letter request to [facility] to continue getting my naritriptan for migraines or they will not allow me to remain on th

## 2021-02-15 NOTE — TELEPHONE ENCOUNTER
Started Botox [id number] PA with Ramila. Called insurance [phone] no PA required for [id number] per [facility]. .Pending case #[id number]

## 2021-02-17 ENCOUNTER — TELEPHONE (OUTPATIENT)
Dept: SURGERY | Facility: CLINIC | Age: 66
End: 2021-02-17

## 2021-02-17 DIAGNOSIS — G43.519 MIGRAINE AURA, PERSISTENT, INTRACTABLE: ICD-10-CM

## 2021-02-17 DIAGNOSIS — G43.719 INTRACTABLE CHRONIC MIGRAINE WITHOUT AURA AND WITHOUT STATUS MIGRAINOSUS: ICD-10-CM

## 2021-02-17 DIAGNOSIS — G43.711 INTRACTABLE CHRONIC MIGRAINE WITHOUT AURA AND WITH STATUS MIGRAINOSUS: ICD-10-CM

## 2021-02-17 DIAGNOSIS — G43.609 PERSISTENT MIGRAINE AURA WITH CEREBRAL INFARCTION AND WITHOUT STATUS MIGRAINOSUS, NOT INTRACTABLE (HCC): ICD-10-CM

## 2021-02-17 DIAGNOSIS — I63.9 PERSISTENT MIGRAINE AURA WITH CEREBRAL INFARCTION AND WITHOUT STATUS MIGRAINOSUS, NOT INTRACTABLE (HCC): ICD-10-CM

## 2021-02-17 DIAGNOSIS — G50.1 ATYPICAL FACIAL PAIN: ICD-10-CM

## 2021-02-17 DIAGNOSIS — G43.701 CHRONIC MIGRAINE WITHOUT AURA WITH STATUS MIGRAINOSUS, NOT INTRACTABLE: ICD-10-CM

## 2021-02-17 DIAGNOSIS — G43.011 INTRACTABLE MIGRAINE WITHOUT AURA AND WITH STATUS MIGRAINOSUS: ICD-10-CM

## 2021-02-17 DIAGNOSIS — M54.81 CERVICO-OCCIPITAL NEURALGIA: ICD-10-CM

## 2021-02-17 RX ORDER — NARATRIPTAN 2.5 MG/1
2.5 TABLET ORAL AS NEEDED
Qty: 18 TABLET | Refills: 0 | Status: SHIPPED | OUTPATIENT
Start: 2021-02-17 | End: 2021-05-12

## 2021-02-17 NOTE — TELEPHONE ENCOUNTER
Patient indicated that she needs a formulary exception letter to continue with the Naratriptan medication. Patient has only 1 or two tabs left. Please call patient.

## 2021-02-17 NOTE — TELEPHONE ENCOUNTER
Pt states that ji insurance needs a PA for naratriptan. New RX will need to be sent to pharmacy to initiate PA process.      Medication: naratriptan     Date of last refill: 1/18/21 (#18/0)  [place]    Last office vi

## 2021-02-27 DIAGNOSIS — M62.89 MUSCLE TIGHTNESS: ICD-10-CM

## 2021-03-01 RX ORDER — TIZANIDINE 2 MG/1
TABLET ORAL
Qty: 60 TABLET | Refills: 2 | Status: SHIPPED | OUTPATIENT
Start: 2021-03-01 | End: 2021-07-09

## 2021-03-01 NOTE — TELEPHONE ENCOUNTER
Medication: TIZANIDINE HCL 2 MG Oral Tab    Date of last refill: 07/13/2020 (#60/2)  [place]    Last office visit: 01/29/2021  Due back to clinic per last office note:  Not stated  Date next office visit scheduled:

## 2021-03-03 NOTE — TELEPHONE ENCOUNTER
Called Ramila and this is approved  from 2/15//21 2 visits. This is buy and bill. Call reference #[id number] per call to insurance [facility] Medicare  spoke with Aden    Please order 200 units of Botox nnd make patient appointment.     Please let

## 2021-03-09 NOTE — TELEPHONE ENCOUNTER
Third attempt to call pt. Left VM that this would be the last time we call to schedule botox. She can be scheduled if/when she calls back.

## 2021-03-10 ENCOUNTER — TELEPHONE (OUTPATIENT)
Dept: NEUROLOGY | Facility: CLINIC | Age: 66
End: 2021-03-10

## 2021-03-10 NOTE — TELEPHONE ENCOUNTER
pt has severe allergies and has had other reactions to other meds; pt would like to know if she should be concerned about receiving Botox; her recent headaches have all been sinus related; pls advise

## 2021-03-12 NOTE — TELEPHONE ENCOUNTER
Per discussion with provider without allergy testing there is no way to rule out the chance of allergic reaction to botox. Called pt and left VM (ok per HIPAA) with this information. Asked that she call back to schedule botox if she decides she wants it.

## 2021-04-07 ENCOUNTER — OFFICE VISIT (OUTPATIENT)
Dept: UROLOGY | Facility: HOSPITAL | Age: 66
End: 2021-04-07
Attending: OBSTETRICS & GYNECOLOGY
Payer: MEDICARE

## 2021-04-07 VITALS — WEIGHT: 185 LBS | RESPIRATION RATE: 18 BRPM | TEMPERATURE: 98 F | BODY MASS INDEX: 30.82 KG/M2 | HEIGHT: 65 IN

## 2021-04-07 DIAGNOSIS — Z79.890 POSTMENOPAUSAL HRT (HORMONE REPLACEMENT THERAPY): ICD-10-CM

## 2021-04-07 DIAGNOSIS — N95.0 POST-MENOPAUSAL BLEEDING: ICD-10-CM

## 2021-04-07 DIAGNOSIS — N39.3 URINARY, INCONTINENCE, STRESS FEMALE: Primary | ICD-10-CM

## 2021-04-07 PROCEDURE — 99212 OFFICE O/P EST SF 10 MIN: CPT

## 2021-04-07 RX ORDER — LIDOCAINE, MENTHOL, METHYL SALICYLATE, CAMPHOR 4; 3; 9; 1.2 1/1; 1/1; 1/1; 1/1
PATCH TOPICAL
COMMUNITY
End: 2021-08-24

## 2021-04-07 RX ORDER — POLYETHYLENE GLYCOL 3350 17 G/17G
17 POWDER, FOR SOLUTION ORAL DAILY
COMMUNITY
End: 2021-06-08 | Stop reason: ALTCHOICE

## 2021-04-07 NOTE — PROGRESS NOTES
ID: Montez Petersen  : 1955  Date: 2021       Patient presents with: Other: urinary incontinene and constipation      HPI:  The patient is a 63year-old female, G0, who is self referred.  She present for evaluation of urinary incontinence whi and left sided C4 C5 with disc bulging noted also at the C4 C5 level.  small central protrusions effacing the subarachnoid space C5 C6 and C6 C7   • Chest pain 2/02    with exercises   • Chronic fatigue syndrome    • Colitis 4/06    sigmoid erythema consis [id number] N/A 11/14/2019    Performed by Susy Mendez MD at [facility]   • EGD SCOPE  2000, 3/18    normal, empiric dilation   • ENDOMETR ABLATE, THERMAL  2000   • ERCP,DIAGNOSTIC  2/11   • ESOPHAGOGASTRODUODENOSCOPY, POSSIBLE BIOPSY, POSSIB [Sulfametho*    ITCHING  Cefdinir                DIARRHEA    Comment:Abdominal pain  Ciprofloxacin           ITCHING  Citric Acid             OTHER (SEE COMMENTS)  Clarithromycin          NAUSEA ONLY    Comment:Nausea and abdominal cramping  Dairy Products Magnesium 20 MG Oral Capsule Delayed Release, Take 40 mg by mouth nightly., Disp: , Rfl:   Ibuprofen (ADVIL) 200 MG Oral Cap, Take by mouth., Disp: , Rfl:   TIZANIDINE HCL 2 MG Oral Tab, TAKE 1 TABLET BY MOUTH TWICE DAILY AS NEEDED (Patient not taking: Rep atrophy, no lesions  Urethra: + atrophy, non tender  Bladder: no fullness, non tender  Vagina: nulliparous introitus, + atrophy, no lesions   Cervix: no bleeding, no lesions, non tender  Uterus: soft, mobile, non tender  Adnexa:non palpable.    Perineum: no

## 2021-04-09 ENCOUNTER — TELEPHONE (OUTPATIENT)
Dept: NEUROLOGY | Facility: CLINIC | Age: 66
End: 2021-04-09

## 2021-04-09 DIAGNOSIS — G43.701 CHRONIC MIGRAINE WITHOUT AURA WITH STATUS MIGRAINOSUS, NOT INTRACTABLE: Primary | ICD-10-CM

## 2021-04-09 RX ORDER — METHYLPREDNISOLONE 4 MG/1
TABLET ORAL
Qty: 1 PACKAGE | Refills: 0 | Status: SHIPPED | OUTPATIENT
Start: 2021-04-09 | End: 2021-04-28

## 2021-04-09 NOTE — TELEPHONE ENCOUNTER
Acute Migraine assessment:    Is this your typical migraine? Describe any change in character from past migraines.   Yes:     Location of Pain (select all that apply):  temple and neck  # Days pain has been present:  7    Describe the pain:  Pressure and T

## 2021-04-09 NOTE — TELEPHONE ENCOUNTER
Per discussion with Dr. Bhavani Cross, patient can come to [facility] for T/D or medrol dose yudith can be ordered. Called patient and she prefers to have a medrol dose yudith. Confirmed patient's preferred pharmacy and sent via [employer].     Advised patient to call if no

## 2021-04-12 DIAGNOSIS — G43.701 CHRONIC MIGRAINE WITHOUT AURA WITH STATUS MIGRAINOSUS, NOT INTRACTABLE: Primary | ICD-10-CM

## 2021-04-12 RX ORDER — AMITRIPTYLINE HYDROCHLORIDE 50 MG/1
TABLET, FILM COATED ORAL
Qty: 30 TABLET | Refills: 2 | Status: SHIPPED | OUTPATIENT
Start: 2021-04-12 | End: 2021-04-28

## 2021-04-12 NOTE — TELEPHONE ENCOUNTER
Medication: AMITRIPTYLINE HCL 50 MG Oral Tab    Date of last refill: 10/30/2020 (#30/1)  [place]    Last office visit: 01/29/2021  Due back to clinic per last office note:  Not stated  Date next office visit schedule

## 2021-04-23 ENCOUNTER — TELEPHONE (OUTPATIENT)
Dept: NEUROLOGY | Facility: CLINIC | Age: 66
End: 2021-04-23

## 2021-04-23 NOTE — TELEPHONE ENCOUNTER
Per PSR: Pt's medication is not working, she has a bad one right now, even the metrol dose pack didn't work and couldn't fall asleep with it    Pt states that she received COVID-19 vaccine a few weeks ago, states migraines have persisted since then.   MDP d

## 2021-04-23 NOTE — TELEPHONE ENCOUNTER
Pt's medication is not working, she has a bad one right now, even the metrol dose pack didn't work and couldn't fall asleep with it

## 2021-04-26 NOTE — TELEPHONE ENCOUNTER
Markie Aguilar MD  You; Brian Lindsey Nurse 5 minutes ago (9:22 AM)     Which covid vaccine did she receive?  I would like her to get a MRI brain ( patient never did it and order pending since July 2020)      Unable to leave voicemail, also sent via My

## 2021-04-27 NOTE — TELEPHONE ENCOUNTER
S: Vaaccine migraine and MRI question    B: LOV 1/29/21     Uncontrolled headache and myofascial pain  Discussed about Botox injection - patient would benefit, will initiate PA  Patient discontinued Emgality injection due to high co-pay/financial issues  C

## 2021-04-28 PROBLEM — F33.1 MDD (MAJOR DEPRESSIVE DISORDER), RECURRENT EPISODE, MODERATE (HCC): Status: ACTIVE | Noted: 2021-04-28

## 2021-04-28 PROBLEM — F42.9 OBSESSIVE-COMPULSIVE DISORDER: Status: ACTIVE | Noted: 2021-04-28

## 2021-04-28 NOTE — TELEPHONE ENCOUNTER
Dorothea Spangler MD  You; Abeba Mendez Nurse 22 hours ago (4:43 PM)     It is possible that second shot might trigger migraine however weighing risk vs benefit, benefit of getting fully vaccinated is higher given ongoing pandemic.      We will tried to

## 2021-05-12 ENCOUNTER — OFFICE VISIT (OUTPATIENT)
Dept: NEUROLOGY | Facility: CLINIC | Age: 66
End: 2021-05-12
Payer: MEDICARE

## 2021-05-12 VITALS
DIASTOLIC BLOOD PRESSURE: 80 MMHG | HEART RATE: 84 BPM | BODY MASS INDEX: 30 KG/M2 | WEIGHT: 179 LBS | RESPIRATION RATE: 16 BRPM | SYSTOLIC BLOOD PRESSURE: 126 MMHG

## 2021-05-12 DIAGNOSIS — M54.81 CERVICO-OCCIPITAL NEURALGIA: ICD-10-CM

## 2021-05-12 DIAGNOSIS — G43.719 INTRACTABLE CHRONIC MIGRAINE WITHOUT AURA AND WITHOUT STATUS MIGRAINOSUS: ICD-10-CM

## 2021-05-12 DIAGNOSIS — G43.519 MIGRAINE AURA, PERSISTENT, INTRACTABLE: ICD-10-CM

## 2021-05-12 DIAGNOSIS — I63.9 PERSISTENT MIGRAINE AURA WITH CEREBRAL INFARCTION AND WITHOUT STATUS MIGRAINOSUS, NOT INTRACTABLE (HCC): ICD-10-CM

## 2021-05-12 DIAGNOSIS — G43.011 INTRACTABLE MIGRAINE WITHOUT AURA AND WITH STATUS MIGRAINOSUS: ICD-10-CM

## 2021-05-12 DIAGNOSIS — G43.701 CHRONIC MIGRAINE WITHOUT AURA WITH STATUS MIGRAINOSUS, NOT INTRACTABLE: Primary | ICD-10-CM

## 2021-05-12 DIAGNOSIS — G43.609 PERSISTENT MIGRAINE AURA WITH CEREBRAL INFARCTION AND WITHOUT STATUS MIGRAINOSUS, NOT INTRACTABLE (HCC): ICD-10-CM

## 2021-05-12 DIAGNOSIS — G50.1 ATYPICAL FACIAL PAIN: ICD-10-CM

## 2021-05-12 DIAGNOSIS — G43.711 INTRACTABLE CHRONIC MIGRAINE WITHOUT AURA AND WITH STATUS MIGRAINOSUS: ICD-10-CM

## 2021-05-12 PROCEDURE — 3074F SYST BP LT 130 MM HG: CPT | Performed by: OTHER

## 2021-05-12 PROCEDURE — 64615 CHEMODENERV MUSC MIGRAINE: CPT | Performed by: OTHER

## 2021-05-12 PROCEDURE — 3079F DIAST BP 80-89 MM HG: CPT | Performed by: OTHER

## 2021-05-12 RX ORDER — NARATRIPTAN 2.5 MG/1
2.5 TABLET ORAL AS NEEDED
Qty: 10 TABLET | Refills: 2 | Status: SHIPPED | OUTPATIENT
Start: 2021-05-12 | End: 2021-08-16

## 2021-05-18 ENCOUNTER — HOSPITAL ENCOUNTER (OUTPATIENT)
Dept: ULTRASOUND IMAGING | Age: 66
Discharge: HOME | End: 2021-05-18
Attending: OBSTETRICS & GYNECOLOGY
Payer: MEDICARE

## 2021-05-18 DIAGNOSIS — N95.0 POST-MENOPAUSAL BLEEDING: ICD-10-CM

## 2021-05-18 PROCEDURE — 76830 TRANSVAGINAL US NON-OB: CPT | Performed by: OBSTETRICS & GYNECOLOGY

## 2021-05-18 PROCEDURE — 76856 US EXAM PELVIC COMPLETE: CPT | Performed by: OBSTETRICS & GYNECOLOGY

## 2021-05-19 ENCOUNTER — TELEPHONE (OUTPATIENT)
Dept: UROLOGY | Facility: HOSPITAL | Age: 66
End: 2021-05-19

## 2021-05-19 ENCOUNTER — TELEPHONE (OUTPATIENT)
Dept: NEUROLOGY | Facility: CLINIC | Age: 66
End: 2021-05-19

## 2021-05-19 NOTE — TELEPHONE ENCOUNTER
Pt calling after seeing her pelvic US results on My Chart. Informed pt Dr Jarret Lloyd reviewed [address] and there is some thickened endometrial lining as well as a growth in the lining of the uterus. I see pt has already LM w/ Dr Jacob Webber who can f/u w/ pt.  Pt will

## 2021-05-19 NOTE — TELEPHONE ENCOUNTER
She can see her eyelashes after her first botox appointment last week. Is there something to do? It is very noticeable.

## 2021-05-25 ENCOUNTER — TELEPHONE (OUTPATIENT)
Dept: NEUROLOGY | Facility: CLINIC | Age: 66
End: 2021-05-25

## 2021-05-25 DIAGNOSIS — M79.18 MYOFASCIAL PAIN: Primary | ICD-10-CM

## 2021-05-25 NOTE — TELEPHONE ENCOUNTER
Pt also would like Dr. Marlene Fonseca to order an MRI of head and neck. This would help with the appt on 6/8/21 with Dr. Marcela Pack.   Call pt to advise

## 2021-05-25 NOTE — TELEPHONE ENCOUNTER
Pt requesting referral for appointment with Dr. Clarissa Justice on 6/8. Referral entered. Pt also requesting orders for MRI Brain and MRI C-spine, to be done prior to appt with Dr. Clarissa Justice on 6/8.   Will route to Dr. Vicente Prather for review, and to enter orders if approp

## 2021-05-27 NOTE — TELEPHONE ENCOUNTER
Patient has a pending MRI brain since July 2020- expires July 2021    I can write referral for Dr Leni Petersen.  I am not sure what MRI dr Leni Petersen would require so he can order MRI C or lumbar spine based on his evaluation

## 2021-05-27 NOTE — TELEPHONE ENCOUNTER
Message left on VM (ok per HIPAA consent) that referral was placed for her to see dr LUC HANCOCK and Dr Abilio Cormier would like Dr LUC HANCOCK to order the MRI. (0) independent

## 2021-06-08 ENCOUNTER — OFFICE VISIT (OUTPATIENT)
Dept: PAIN CLINIC | Facility: CLINIC | Age: 66
End: 2021-06-08
Payer: MEDICARE

## 2021-06-08 VITALS
WEIGHT: 179 LBS | BODY MASS INDEX: 30 KG/M2 | HEART RATE: 92 BPM | OXYGEN SATURATION: 96 % | DIASTOLIC BLOOD PRESSURE: 84 MMHG | SYSTOLIC BLOOD PRESSURE: 128 MMHG

## 2021-06-08 DIAGNOSIS — M47.812 ARTHROPATHY OF CERVICAL FACET JOINT: Primary | ICD-10-CM

## 2021-06-08 DIAGNOSIS — M54.12 CERVICAL RADICULITIS: ICD-10-CM

## 2021-06-08 PROCEDURE — 3074F SYST BP LT 130 MM HG: CPT | Performed by: ANESTHESIOLOGY

## 2021-06-08 PROCEDURE — 3079F DIAST BP 80-89 MM HG: CPT | Performed by: ANESTHESIOLOGY

## 2021-06-08 PROCEDURE — 99215 OFFICE O/P EST HI 40 MIN: CPT | Performed by: ANESTHESIOLOGY

## 2021-06-08 RX ORDER — ESTRADIOL 0.07 MG/D
FILM, EXTENDED RELEASE TRANSDERMAL
COMMUNITY
Start: 2021-05-03 | End: 2021-06-08 | Stop reason: ALTCHOICE

## 2021-06-08 NOTE — PROGRESS NOTES
HPI/Subjective:   Patient ID: Zaida Hinkle is a 63year old female.     HPI    History/Other:   Review of Systems  Current Outpatient Medications   Medication Sig Dispense Refill   • Estradiol 0.075 MG/24HR Transdermal Patch Biweekly      • Naratriptan Take by mouth every 12 (twelve) hours. • Ibuprofen (ADVIL) 200 MG Oral Cap Take by mouth. • MULTIVITAMIN TAB/CAP Take 1 tablet by mouth daily.        Allergies:  Codeine                 ITCHING  Morphine                NAUSEA AND VOMITING, ITCHING Origin of Pain:    Degenerative , traumatic accident    Aggravating Factors:     Other positional, sitting    Past Treatments for Current Pain Condition:   Physical Therapy    Prior diagnostic testing for your pain:  Nothing recent

## 2021-06-10 ENCOUNTER — OFFICE VISIT (OUTPATIENT)
Dept: FAMILY MEDICINE CLINIC | Facility: CLINIC | Age: 66
End: 2021-06-10
Payer: MEDICARE

## 2021-06-10 VITALS
BODY MASS INDEX: 28.28 KG/M2 | OXYGEN SATURATION: 98 % | TEMPERATURE: 98 F | SYSTOLIC BLOOD PRESSURE: 100 MMHG | WEIGHT: 176 LBS | RESPIRATION RATE: 16 BRPM | HEART RATE: 98 BPM | DIASTOLIC BLOOD PRESSURE: 70 MMHG | HEIGHT: 66 IN

## 2021-06-10 DIAGNOSIS — Z86.19 HISTORY OF COLD SORES: ICD-10-CM

## 2021-06-10 DIAGNOSIS — E78.2 MIXED HYPERLIPIDEMIA: ICD-10-CM

## 2021-06-10 DIAGNOSIS — I10 ESSENTIAL HYPERTENSION, BENIGN: Primary | ICD-10-CM

## 2021-06-10 DIAGNOSIS — L65.9 HAIR LOSS: ICD-10-CM

## 2021-06-10 DIAGNOSIS — J30.2 SEASONAL ALLERGIES: ICD-10-CM

## 2021-06-10 DIAGNOSIS — K21.9 GASTROESOPHAGEAL REFLUX DISEASE WITHOUT ESOPHAGITIS: ICD-10-CM

## 2021-06-10 DIAGNOSIS — E55.9 VITAMIN D DEFICIENCY: ICD-10-CM

## 2021-06-10 PROCEDURE — 3078F DIAST BP <80 MM HG: CPT | Performed by: FAMILY MEDICINE

## 2021-06-10 PROCEDURE — 3008F BODY MASS INDEX DOCD: CPT | Performed by: FAMILY MEDICINE

## 2021-06-10 PROCEDURE — 3074F SYST BP LT 130 MM HG: CPT | Performed by: FAMILY MEDICINE

## 2021-06-10 PROCEDURE — 99204 OFFICE O/P NEW MOD 45 MIN: CPT | Performed by: FAMILY MEDICINE

## 2021-06-10 RX ORDER — AMLODIPINE BESYLATE 5 MG/1
5 TABLET ORAL
Qty: 90 TABLET | Refills: 0 | Status: SHIPPED | OUTPATIENT
Start: 2021-06-10 | End: 2021-09-21

## 2021-06-10 RX ORDER — VALACYCLOVIR HYDROCHLORIDE 1 G/1
2 TABLET, FILM COATED ORAL EVERY 12 HOURS SCHEDULED
Qty: 21 TABLET | Refills: 0 | Status: SHIPPED | OUTPATIENT
Start: 2021-06-10 | End: 2021-06-11

## 2021-06-10 NOTE — PROGRESS NOTES
HPI/Subjective:   Patient ID: Cindy Pedraza is a 63year old female. HPI   65yr old female presents as a new patient to f/u on chronic conditions and med refills. HTN, taking amlodipine 5mg po daily.  States blood pressure usually fluctuates a littl • Amitriptyline HCl 75 MG Oral Tab Take 1 tablet (75 mg total) by mouth nightly.  30 tablet 2   • ALPRAZolam 0.5 MG Oral Tab TAKE half to 1 TABLET BY MOUTH DAILY AS NEEDED FOR ANXIETY 30 tablet 0   • clonazePAM 0.5 MG Oral Tab TAKE 1.5-2 TABLETS BY MOUTH Reglan [Metoclopram*    PALPITATIONS, OTHER (SEE COMMENTS)    Comment:Hypertension with Reglan administration via IV  Seasonal                  Tomato                  OTHER (SEE COMMENTS)  Zomig [Zolmitriptan]    ITCHING    Objective:   Physical Exam  V Oral Tab 21 tablet 0     Sig: Take 2 tablets (2,000 mg total) by mouth every 12 (twelve) hours for 1 day. • amLODIPine Besylate 5 MG Oral Tab 90 tablet 0     Sig: Take 1 tablet (5 mg total) by mouth once daily.        Imaging & Referrals:  None

## 2021-06-10 NOTE — PATIENT INSTRUCTIONS
Controlling Your Cholesterol  Cholesterol is a waxy substance. It travels in your blood through the blood vessels. When you have high cholesterol, it can build up along the walls of the blood vessels.  This makes the vessels narrower and decreases blood f levels, another form of fat in the blood. If you are pregnant or thinking of becoming pregnant or are breastfeeding, talk with your healthcare provider for advice about the best fish choices and how much to eat.   · Eat more whole grains and soluble fiber ( be very dangerous. High blood pressure can raise your risk of heart attack, stroke, heart disease, and heart failure. Controlling your blood pressure can decrease your risk of these problems.  It's important to know the appropriate blood pressure range and Ask your healthcare provider what weight range is healthiest for you. If you are overweight, a weight loss of only 3% to 5% of your body weight can help lower blood pressure. Generally, a good weight loss goal is to lose 10% of your body weight in a year.

## 2021-06-13 NOTE — PROGRESS NOTES
Name: Victorino Cerrato   : 1955   DOS: 2021    Pain Clinic Follow Up Visit:   Victorino Cerrato is a 63year old female who presents for recheck of her chronic neck pain and headache.   The patient had radiofrequency ablation of cervical media External Patch Apply topically.      • MONTELUKAST SODIUM 10 MG Oral Tab TAKE 1 TABLET BY MOUTH EVERY DAY 90 tablet 1   • TIZANIDINE HCL 2 MG Oral Tab TAKE 1 TABLET BY MOUTH TWICE DAILY AS NEEDED 60 tablet 2   • Dicyclomine HCl 20 MG Oral Tab Take 1 tablet strength to resistance in all directions. Examination of the left shoulder joint:  Flexion 0-170, abduction 0-160, external rotation 0-70, internal rotation 0-70, abduction and extension 0-50 degrees. Positive sub-acromial bursitis.   Positive impingeme

## 2021-06-14 ENCOUNTER — TELEPHONE (OUTPATIENT)
Dept: NEUROLOGY | Facility: CLINIC | Age: 66
End: 2021-06-14

## 2021-06-14 NOTE — TELEPHONE ENCOUNTER
Started PA via [id number] for Left C4-C7 RFA ([id number]) , Uploaded Clinicals     Case #:[id number]

## 2021-06-17 ENCOUNTER — TELEPHONE (OUTPATIENT)
Dept: NEUROLOGY | Facility: CLINIC | Age: 66
End: 2021-06-17

## 2021-06-17 NOTE — TELEPHONE ENCOUNTER
Oral steroids 4/9/2021 from Dr. Akin Garvin. F/U with Dr Akin Garvin on 4/23, 5/12, 5/19, 5/25. Contacted [facility] to schedule P2P. Case: [id number]    aHnnah Prieto. Request was non-certified as of today, 6/17. Reason for non-certification is lack of physician directed medication with follow up. Peer to peer needs to be done by today. Earliest availability is after clinic hours, therefore is not feasible. Jagjti Bautista states we can withdraw the request and resubmit, otherwise it will go to written appeal. Asked Jagjit Bautista to withdraw the case to avoid appeal and advised we would resubmit online. Case withdrawn.

## 2021-06-17 NOTE — TELEPHONE ENCOUNTER
Request for Left C4-C7 RFA ([id number]) is not meeting medical criteria -See Below     Based on Medicare Local Coverage Determination (LCD): Facet Joint Interventions for   Pain Management (E88274), we cannot approve this request. The requested service is

## 2021-06-18 NOTE — TELEPHONE ENCOUNTER
Zee Coulter MD  You 7 hours ago (1:18 AM)     Please schedule a peer to peer      New Case #: [id number]  Spoke to Maral Limon who states P2P cannot be extended to 6/22 (when Dr Mary Kee is back in the office), case will be denied if P2P is not completed by

## 2021-06-22 NOTE — TELEPHONE ENCOUNTER
MD Mukul Elder RN 2 minutes ago (8:14 AM)     Anthony Presume, please bring the patient back for radiofrequency ablation of left cervical medial branch at C4-C5 and C5-C6    Message text

## 2021-06-22 NOTE — TELEPHONE ENCOUNTER
Previous Case ending in [id number] was withdrawn.      Resubmitted PA via [employer] for Left C4-C6 RFA ([id number]) , Uploaded Clinicals     Case #:[id number]

## 2021-06-23 ENCOUNTER — TELEPHONE (OUTPATIENT)
Dept: NEUROLOGY | Facility: CLINIC | Age: 66
End: 2021-06-23

## 2021-06-23 NOTE — TELEPHONE ENCOUNTER
Spoke with Alis Ahn @ [facility] to schedule a Peer to Peer. Per Alis Ahn peer to peer needs to be completed by 6/25/2021. Case will be denied on 6/25/21. Provider not available in office until 6/29/2021 to complete a peer to peer. Alis Ahn stated case can be withdrawn and resubmitted before denial on 6/25. Alis Ahn states appeal may be done but can take 30 days for review and decision. PA notified of above.  Case will be withdrawn and resubmitted on Monday 6/28/2021

## 2021-06-29 NOTE — TELEPHONE ENCOUNTER
Prior authorization request completed for: Left C4-C7 RFA   Authorization #[id number]     Authorization dates: 06/29/21 - 08/06/21  CPT codes approved: 12661,32268  Number of visits/dates of service approved: 1  Physician: Neo López   [facility]

## 2021-06-29 NOTE — TELEPHONE ENCOUNTER
Prior authorization request completed for: Bilateral Shoulder Joint and Subdeltoid Bursa Injection     Authorization #No Prior Authorization is Required   Spoke with Stephen at [address] [phone]  Reference #:[id number]   Time:08:10    A

## 2021-06-29 NOTE — TELEPHONE ENCOUNTER
Question Answer Comment   Anesthesia Type Sedation    Provider Parul Damian    Location Lab    Procedure RFA    Laterality/Level Radiofrequency ablation of left cervical medial branch at C4-C5, C5-C6 and C6-C7 level under fluoroscopy    Implants No    Medical amanda

## 2021-06-29 NOTE — TELEPHONE ENCOUNTER
Question Answer Comment   Anesthesia Type Sedation    Provider Jovanna Duron    Location Lab    Procedure Other (please add comment)    Implants No    Medical clearance requested (will send to Pain Navigator) No    Patient has Medicare coverage?  No    Comments (Ple

## 2021-06-29 NOTE — TELEPHONE ENCOUNTER
Contacted pt to ask about % pain relief. Pt states she obtained approx 90% relief. Pt states she did experience many months of relief. Per Dr. Stephanie Velazco notes, pt obtained over 2.5 years of relief.  Pt states her daily activity has essentially stopped, whe

## 2021-06-29 NOTE — TELEPHONE ENCOUNTER
Received Fax from [employer] that Left C4-C7 RFA ([id number]) is not meeting medical criteria     Based on Medicare Local Coverage Determination (LCD): Facet Joint Interventions for   Pain Management (G58272), we cannot approve this request. Your records do

## 2021-07-02 NOTE — TELEPHONE ENCOUNTER
Received Fax from [facility] regarding Cervical MRI ([id number]) is not meeting criteria :    If we do not receive additional information, or a request for a [id number]-tk-[id number] discussion, to   support an approval by the date specified, the information below will be used in making a   determination. This information contains the reason why the request does not currently   meet medical necessity, and the information needed to support an approval:    Based on Medicare National Coverage Determinations (NCD): 220.2 Magnetic   Resonance Imaging and [employer] Spine Imaging Guidelines Section(s): SP 3.1 Neck   (Cervical Spine) Pain without and with Neurological Features (Including Stenosis) and   1.0 General Guidelines, we cannot approve this request. Your records show that you   have a problem with your neck. The reason this request cannot be approved is because: You have not completed six weeks of treatment directed by your doctor. Please provide the requested clinical information Before 7/6/2021.  Please call [phone],   select the prompt associated with the request type and enter in reference number [id number] to speak with a clinician about this request

## 2021-07-06 NOTE — TELEPHONE ENCOUNTER
Spoke with Thee Linares @ [facility] and scheduled a peer to peer with Dr Luma Bishop and Dr Parr [facility] 7/6/20921 at 2:15 PM.     OV notes and denial information on Dr Alejandro Bloom desk.

## 2021-07-06 NOTE — TELEPHONE ENCOUNTER
Peer to peer completed between Dr Abril Leavitt and Dr Cindy Wheeler. Approval for MRI provided.      Auth #: [id number]  Dates: 7/6/2021 through 1/2/2022

## 2021-07-07 NOTE — TELEPHONE ENCOUNTER
Authorization confirmed and noted in referral.  Patient informed of authorization and given Central Scheduling number. Thank you.

## 2021-07-09 DIAGNOSIS — M62.89 MUSCLE TIGHTNESS: ICD-10-CM

## 2021-07-09 RX ORDER — TIZANIDINE 2 MG/1
TABLET ORAL
Qty: 60 TABLET | Refills: 2 | Status: SHIPPED | OUTPATIENT
Start: 2021-07-09

## 2021-07-09 NOTE — TELEPHONE ENCOUNTER
Medication: TIZANIDINE HCL 2 MG     Date of last refill: 3/1/21 (#30/2)  [place]     Last office visit: 5/12/2021  Due back to clinic per last office note:  3 months  Date next office visit scheduled:    Future Appointme

## 2021-07-29 ENCOUNTER — TELEPHONE (OUTPATIENT)
Dept: PAIN CLINIC | Facility: CLINIC | Age: 66
End: 2021-07-29

## 2021-08-16 ENCOUNTER — TELEPHONE (OUTPATIENT)
Dept: NEUROLOGY | Facility: CLINIC | Age: 66
End: 2021-08-16

## 2021-08-16 DIAGNOSIS — M54.81 CERVICO-OCCIPITAL NEURALGIA: ICD-10-CM

## 2021-08-16 DIAGNOSIS — G43.609 PERSISTENT MIGRAINE AURA WITH CEREBRAL INFARCTION AND WITHOUT STATUS MIGRAINOSUS, NOT INTRACTABLE (HCC): ICD-10-CM

## 2021-08-16 DIAGNOSIS — I63.9 PERSISTENT MIGRAINE AURA WITH CEREBRAL INFARCTION AND WITHOUT STATUS MIGRAINOSUS, NOT INTRACTABLE (HCC): ICD-10-CM

## 2021-08-16 DIAGNOSIS — G43.719 INTRACTABLE CHRONIC MIGRAINE WITHOUT AURA AND WITHOUT STATUS MIGRAINOSUS: ICD-10-CM

## 2021-08-16 DIAGNOSIS — G43.701 CHRONIC MIGRAINE WITHOUT AURA WITH STATUS MIGRAINOSUS, NOT INTRACTABLE: ICD-10-CM

## 2021-08-16 DIAGNOSIS — G43.519 MIGRAINE AURA, PERSISTENT, INTRACTABLE: ICD-10-CM

## 2021-08-16 DIAGNOSIS — G43.011 INTRACTABLE MIGRAINE WITHOUT AURA AND WITH STATUS MIGRAINOSUS: ICD-10-CM

## 2021-08-16 DIAGNOSIS — G50.1 ATYPICAL FACIAL PAIN: ICD-10-CM

## 2021-08-16 DIAGNOSIS — M54.81 CERVICO-OCCIPITAL NEURALGIA: Primary | ICD-10-CM

## 2021-08-16 DIAGNOSIS — G43.711 INTRACTABLE CHRONIC MIGRAINE WITHOUT AURA AND WITH STATUS MIGRAINOSUS: ICD-10-CM

## 2021-08-16 RX ORDER — NARATRIPTAN 2.5 MG/1
2.5 TABLET ORAL AS NEEDED
Qty: 10 TABLET | Refills: 2 | Status: SHIPPED | OUTPATIENT
Start: 2021-08-16 | End: 2021-12-16

## 2021-08-16 NOTE — TELEPHONE ENCOUNTER
Pt ran out of medication over the weekend and would appreciate a rush.     Patient calling to request refill of [address], [address] [facility], [phone], [phone]

## 2021-08-16 NOTE — TELEPHONE ENCOUNTER
Medication: naratriptan    Date of last refill: 5/12/21 (#10/2)  [place]    Last office visit: 5/12/21  Due back to clinic per last office note:  3 months  Date next office visit scheduled:    Future Appointments   Da

## 2021-08-16 NOTE — TELEPHONE ENCOUNTER
Called insurance [facility] Medicare Advantage [phone] and spoke with [facility]. Codes [id number] and [id number] TPI and 23729,53842 Nerve blocks. All codes are valid and billable no authorization required. Call reference #[id number].  Time on call 10:22

## 2021-08-24 ENCOUNTER — OFFICE VISIT (OUTPATIENT)
Dept: NEUROLOGY | Facility: CLINIC | Age: 66
End: 2021-08-24
Payer: MEDICARE

## 2021-08-24 ENCOUNTER — TELEPHONE (OUTPATIENT)
Dept: NEUROLOGY | Facility: CLINIC | Age: 66
End: 2021-08-24

## 2021-08-24 ENCOUNTER — TELEPHONE (OUTPATIENT)
Dept: SURGERY | Facility: CLINIC | Age: 66
End: 2021-08-24

## 2021-08-24 VITALS
WEIGHT: 180.19 LBS | SYSTOLIC BLOOD PRESSURE: 124 MMHG | HEART RATE: 82 BPM | DIASTOLIC BLOOD PRESSURE: 72 MMHG | RESPIRATION RATE: 16 BRPM | BODY MASS INDEX: 29 KG/M2

## 2021-08-24 DIAGNOSIS — M54.81 CERVICO-OCCIPITAL NEURALGIA: Primary | ICD-10-CM

## 2021-08-24 DIAGNOSIS — M79.18 MYOFASCIAL PAIN: ICD-10-CM

## 2021-08-24 PROCEDURE — 64405 NJX AA&/STRD GR OCPL NRV: CPT | Performed by: OTHER

## 2021-08-24 PROCEDURE — 99212 OFFICE O/P EST SF 10 MIN: CPT | Performed by: OTHER

## 2021-08-24 PROCEDURE — 3078F DIAST BP <80 MM HG: CPT | Performed by: OTHER

## 2021-08-24 PROCEDURE — 3074F SYST BP LT 130 MM HG: CPT | Performed by: OTHER

## 2021-08-24 PROCEDURE — 20552 NJX 1/MLT TRIGGER POINT 1/2: CPT | Performed by: OTHER

## 2021-08-24 PROCEDURE — S0020 INJECTION, BUPIVICAINE HYDRO: HCPCS | Performed by: OTHER

## 2021-08-24 RX ORDER — BUPIVACAINE HYDROCHLORIDE 5 MG/ML
4 INJECTION, SOLUTION PERINEURAL ONCE
Status: COMPLETED | OUTPATIENT
Start: 2021-08-24 | End: 2021-08-24

## 2021-08-24 RX ORDER — AMITRIPTYLINE HYDROCHLORIDE 50 MG/1
50 TABLET, FILM COATED ORAL 2 TIMES DAILY
Qty: 60 TABLET | Refills: 2 | Status: SHIPPED | OUTPATIENT
Start: 2021-08-24 | End: 2021-10-14

## 2021-08-24 RX ORDER — TRIAMCINOLONE ACETONIDE 40 MG/ML
40 INJECTION, SUSPENSION INTRA-ARTICULAR; INTRAMUSCULAR ONCE
Status: COMPLETED | OUTPATIENT
Start: 2021-08-24 | End: 2021-08-24

## 2021-08-24 RX ADMIN — TRIAMCINOLONE ACETONIDE 40 MG: 40 INJECTION, SUSPENSION INTRA-ARTICULAR; INTRAMUSCULAR at 12:20:00

## 2021-08-24 NOTE — PROGRESS NOTES
Patient states left side neck and shoulder pain. Patient states some right side shoulder pain. Headaches occur mostly on the left side.

## 2021-08-24 NOTE — TELEPHONE ENCOUNTER
Authorization for Botox is now with [facility]. Approval #[id number] from 8/25/21-12/23/21- richard and [facility] [phone] and spoke with Fernando Brennan. Call reference #[id number].  Time on call 7:06    Ok to schedule once Botox inna

## 2021-08-24 NOTE — TELEPHONE ENCOUNTER
[facility] Approval #[id number] from 8/25/21-12/23/21- richard and [facility] [phone] and spoke with Ruth Burris. Call reference #[id number].  Time on call 7:06

## 2021-08-24 NOTE — TELEPHONE ENCOUNTER
LM for patient to call and schedule botox (buy and bill) in two weeks with Dr. Jomar Álvarez - open schedule if needed

## 2021-08-24 NOTE — PROCEDURES
Indication: Bilateral occipital neuralgia and Myofascial pain  Consent obtained after discussing risk v benefit     Procedure: The areas are prepped and cleaned with betadine scrub and alcohol.  Bilateral occipital nerve block performed using a mixture

## 2021-08-24 NOTE — PROGRESS NOTES
HPI:    Patient ID: Migue Balderas is a 63year old female. Migraine     Neurologic Problem  Associated symptoms include headaches. Patient is a 63 year old female with history of chronic headaches nd cervical occipital pain.   States headache small central protrusion effacing the thecal sax with a small annular fissure in the posterior annular fibers   • Migraines     neuro: Dr. Mora Cost   • Nephrolithiasis    • Osteopenia 5/31/12   • Palpitations 2/98    likely anxiety related to dystr Cardiovascular: Negative. Gastrointestinal: Negative. Endocrine: Negative. Genitourinary: Negative. Musculoskeletal: Positive for neck stiffness. Neurological: Positive for headaches. All other systems reviewed and are negative. MYALGIA  Topamax [Topiramate]        Comment:Blurry vision  Alc-Benzyl Alc-Sulf*    RASH  Amoxicillin-Pot Cla*        Comment:Other reaction(s): rash  Bactrim [Sulfametho*    ITCHING  Cefdinir                DIARRHEA    Comment:Abdominal ce approximately 5/5 bilaterally. Finger-to-nose coordination is intact. Gait brisk and steady.           ASSESSMENT/PLAN:     (M54.81) Cervico-occipital neuralgia  (primary encounter diagnosis)      (M79.18) Myofascial pain     Left cervical radiculopathy

## 2021-09-15 ENCOUNTER — APPOINTMENT (OUTPATIENT)
Dept: CV DIAGNOSTICS | Facility: HOSPITAL | Age: 66
End: 2021-09-15
Attending: EMERGENCY MEDICINE
Payer: MEDICARE

## 2021-09-15 ENCOUNTER — TELEPHONE (OUTPATIENT)
Dept: FAMILY MEDICINE CLINIC | Facility: CLINIC | Age: 66
End: 2021-09-15

## 2021-09-15 ENCOUNTER — HOSPITAL ENCOUNTER (EMERGENCY)
Facility: HOSPITAL | Age: 66
Discharge: HOME | End: 2021-09-15
Attending: EMERGENCY MEDICINE
Payer: MEDICARE

## 2021-09-15 ENCOUNTER — APPOINTMENT (OUTPATIENT)
Dept: GENERAL RADIOLOGY | Facility: HOSPITAL | Age: 66
End: 2021-09-15
Attending: EMERGENCY MEDICINE
Payer: MEDICARE

## 2021-09-15 VITALS
RESPIRATION RATE: 15 BRPM | SYSTOLIC BLOOD PRESSURE: 130 MMHG | OXYGEN SATURATION: 96 % | WEIGHT: 183 LBS | DIASTOLIC BLOOD PRESSURE: 75 MMHG | TEMPERATURE: 98 F | HEIGHT: 66 IN | HEART RATE: 96 BPM | BODY MASS INDEX: 29.41 KG/M2

## 2021-09-15 DIAGNOSIS — R07.9 CHEST PAIN OF UNCERTAIN ETIOLOGY: Primary | ICD-10-CM

## 2021-09-15 LAB
ALBUMIN SERPL-MCNC: 3.5 G/DL (ref 3.4–5)
ALBUMIN/GLOB SERPL: 0.8 {RATIO} (ref 1–2)
ALP LIVER SERPL-CCNC: 31 U/L
ALT SERPL-CCNC: 38 U/L
ANION GAP SERPL CALC-SCNC: 4 MMOL/L (ref 0–18)
AST SERPL-CCNC: 26 U/L (ref 15–37)
BASOPHILS # BLD AUTO: 0.06 X10(3) UL (ref 0–0.2)
BASOPHILS NFR BLD AUTO: 0.5 %
BILIRUB SERPL-MCNC: 0.4 MG/DL (ref 0.1–2)
BUN BLD-MCNC: 17 MG/DL (ref 7–18)
CALCIUM BLD-MCNC: 9.7 MG/DL (ref 8.5–10.1)
CHLORIDE SERPL-SCNC: 104 MMOL/L (ref 98–112)
CO2 SERPL-SCNC: 29 MMOL/L (ref 21–32)
CREAT BLD-MCNC: 0.81 MG/DL
EOSINOPHIL # BLD AUTO: 0.07 X10(3) UL (ref 0–0.7)
EOSINOPHIL NFR BLD AUTO: 0.6 %
ERYTHROCYTE [DISTWIDTH] IN BLOOD BY AUTOMATED COUNT: 13.6 %
GLOBULIN PLAS-MCNC: 4.2 G/DL (ref 2.8–4.4)
GLUCOSE BLD-MCNC: 92 MG/DL (ref 70–99)
HCT VFR BLD AUTO: 42.7 %
HGB BLD-MCNC: 14.2 G/DL
IMM GRANULOCYTES # BLD AUTO: 0.06 X10(3) UL (ref 0–1)
IMM GRANULOCYTES NFR BLD: 0.5 %
LYMPHOCYTES # BLD AUTO: 4.22 X10(3) UL (ref 1–4)
LYMPHOCYTES NFR BLD AUTO: 33.8 %
MCH RBC QN AUTO: 29.6 PG (ref 26–34)
MCHC RBC AUTO-ENTMCNC: 33.3 G/DL (ref 31–37)
MCV RBC AUTO: 89 FL
MONOCYTES # BLD AUTO: 0.91 X10(3) UL (ref 0.1–1)
MONOCYTES NFR BLD AUTO: 7.3 %
NEUTROPHILS # BLD AUTO: 7.17 X10 (3) UL (ref 1.5–7.7)
NEUTROPHILS # BLD AUTO: 7.17 X10(3) UL (ref 1.5–7.7)
NEUTROPHILS NFR BLD AUTO: 57.3 %
OSMOLALITY SERPL CALC.SUM OF ELEC: 285 MOSM/KG (ref 275–295)
PLATELET # BLD AUTO: 314 10(3)UL (ref 150–450)
POTASSIUM SERPL-SCNC: 4 MMOL/L (ref 3.5–5.1)
PROT SERPL-MCNC: 7.7 G/DL (ref 6.4–8.2)
RBC # BLD AUTO: 4.8 X10(6)UL
SARS-COV-2 RNA RESP QL NAA+PROBE: NOT DETECTED
SODIUM SERPL-SCNC: 137 MMOL/L (ref 136–145)
TROPONIN I SERPL-MCNC: <0.045 NG/ML (ref ?–0.04)
WBC # BLD AUTO: 12.5 X10(3) UL (ref 4–11)

## 2021-09-15 PROCEDURE — 84484 ASSAY OF TROPONIN QUANT: CPT | Performed by: EMERGENCY MEDICINE

## 2021-09-15 PROCEDURE — 36415 COLL VENOUS BLD VENIPUNCTURE: CPT

## 2021-09-15 PROCEDURE — 93018 CV STRESS TEST I&R ONLY: CPT | Performed by: EMERGENCY MEDICINE

## 2021-09-15 PROCEDURE — 93010 ELECTROCARDIOGRAM REPORT: CPT

## 2021-09-15 PROCEDURE — 93350 STRESS TTE ONLY: CPT | Performed by: EMERGENCY MEDICINE

## 2021-09-15 PROCEDURE — 93005 ELECTROCARDIOGRAM TRACING: CPT

## 2021-09-15 PROCEDURE — 99284 EMERGENCY DEPT VISIT MOD MDM: CPT

## 2021-09-15 PROCEDURE — 80053 COMPREHEN METABOLIC PANEL: CPT | Performed by: EMERGENCY MEDICINE

## 2021-09-15 PROCEDURE — 99285 EMERGENCY DEPT VISIT HI MDM: CPT

## 2021-09-15 PROCEDURE — 71045 X-RAY EXAM CHEST 1 VIEW: CPT | Performed by: EMERGENCY MEDICINE

## 2021-09-15 PROCEDURE — 85025 COMPLETE CBC W/AUTO DIFF WBC: CPT | Performed by: EMERGENCY MEDICINE

## 2021-09-15 PROCEDURE — 93017 CV STRESS TEST TRACING ONLY: CPT | Performed by: EMERGENCY MEDICINE

## 2021-09-15 NOTE — TELEPHONE ENCOUNTER
Pt having pains in her chest mid chest and back. She said it's a pressure that is also going into her jaw. In the last 3 weeks she has had 3 of these episodes.

## 2021-09-15 NOTE — ED NOTES
Stress echo read by Dr. Leeanne Carrasco was negative.   Discussed with her she needs close follow-up with her primary care physician

## 2021-09-15 NOTE — ED PROVIDER NOTES
Patient Seen in: [facility] Emergency Department      History   Patient presents with:  Chest Pain Angina    Stated Complaint: Chest Pain    Subjective:   HPI    72-year-old female comes to the hospital complaint of having difficulty with chest pain Dr. Pam Alberts   • Nephrolithiasis    • Osteopenia 5/31/12   • Palpitations 2/98    likely anxiety related to dystrophic nails secondary to mechanical injury    • Postmenopausal HRT (hormone replacement therapy)    • Seasonal allergies    • Vitamin D (Room air)       Current:BP (!) 140/91   Pulse 88   Temp 98 °F (36.7 °C) (Temporal)   Resp 14   Ht 167.6 cm (5' 6\")   Wt 83 kg   LMP  (LMP Unknown)   SpO2 96%   BMI 29.54 kg/m²         Physical Exam    HEENT : NCAT, EOMI, PEERL,  neck supple, no JVD, trac AP PORTABLE  (CPT=71045), 8/10/2019, 12:55 PM.  INDICATIONS:  Chest Pain  PATIENT STATED HISTORY: (As transcribed by Technologist)  Patient complains of intermitten chest pain for a couple of weeks.    FINDINGS: Cardiac silhouette and pulmonary vasculature

## 2021-09-15 NOTE — ED INITIAL ASSESSMENT (HPI)
Pt reports cp last night for about an hour, no pain this morning, has happened a few times in the past, felt like crushing pain, no shortness of breath

## 2021-09-15 NOTE — TELEPHONE ENCOUNTER
Spoke to patient who states she has been having weekly episodes for the past three weeks of \"crushing\" chest pain radiating to her back, shoulders and up into neck and jaw area. Feels like indigestion and can't lie down when it happens.  Patient does have

## 2021-09-16 ENCOUNTER — TELEPHONE (OUTPATIENT)
Dept: NEUROLOGY | Facility: CLINIC | Age: 66
End: 2021-09-16

## 2021-09-16 NOTE — TELEPHONE ENCOUNTER
MD Esdras Cespedes RN; [facility] Nurse  Caller: Unspecified (Today,  9:21 AM)  We can do Sept 24 Fri at noon if that works for her   Thanks!      Will route to

## 2021-09-16 NOTE — TELEPHONE ENCOUNTER
Patient scheduled Botox (20 min only due to schedule) for 10/13/21. She states that she was told she could be squeezed in quickly, so she is asking for sooner appt. Can we fit her in sooner?

## 2021-09-17 ENCOUNTER — TELEPHONE (OUTPATIENT)
Dept: FAMILY MEDICINE CLINIC | Facility: CLINIC | Age: 66
End: 2021-09-17

## 2021-09-17 LAB
ATRIAL RATE: 92 BPM
P AXIS: 50 DEGREES
P-R INTERVAL: 150 MS
Q-T INTERVAL: 340 MS
QRS DURATION: 88 MS
QTC CALCULATION (BEZET): 420 MS
R AXIS: -70 DEGREES
T AXIS: 34 DEGREES
VENTRICULAR RATE: 92 BPM

## 2021-09-17 NOTE — TELEPHONE ENCOUNTER
Called patient and scheduled for ED follow up. Was seen 9/15/21 for chest pain. No acute findings on work-up.   Needs cardiology referral.    Future Appointments   Date Time Provider Siddhartha Aleman   9/21/2021 12:00 PM Vicki Powers DO [facility] 21

## 2021-09-17 NOTE — TELEPHONE ENCOUNTER
Patient stated that she was in the ER on 9/14 overnight to 9/15 for chest pain. She said that     She was told to follow up with Dr. Lolly Gramajo. and get a recommendation for a cardiologist.    Please advise where to schedule.   Patient would like the recommen

## 2021-09-21 ENCOUNTER — OFFICE VISIT (OUTPATIENT)
Dept: FAMILY MEDICINE CLINIC | Facility: CLINIC | Age: 66
End: 2021-09-21
Payer: MEDICARE

## 2021-09-21 VITALS
WEIGHT: 180.5 LBS | TEMPERATURE: 97 F | SYSTOLIC BLOOD PRESSURE: 110 MMHG | DIASTOLIC BLOOD PRESSURE: 72 MMHG | HEART RATE: 97 BPM | RESPIRATION RATE: 16 BRPM | OXYGEN SATURATION: 98 % | BODY MASS INDEX: 29.01 KG/M2 | HEIGHT: 66 IN

## 2021-09-21 DIAGNOSIS — T63.461A: ICD-10-CM

## 2021-09-21 DIAGNOSIS — Z23 NEED FOR VACCINATION: ICD-10-CM

## 2021-09-21 DIAGNOSIS — F41.9 ANXIETY DISORDER, UNSPECIFIED TYPE: ICD-10-CM

## 2021-09-21 DIAGNOSIS — I10 ESSENTIAL HYPERTENSION, BENIGN: ICD-10-CM

## 2021-09-21 DIAGNOSIS — R07.89 ATYPICAL CHEST PAIN: Primary | ICD-10-CM

## 2021-09-21 PROCEDURE — 90686 IIV4 VACC NO PRSV 0.5 ML IM: CPT | Performed by: FAMILY MEDICINE

## 2021-09-21 PROCEDURE — 3074F SYST BP LT 130 MM HG: CPT | Performed by: FAMILY MEDICINE

## 2021-09-21 PROCEDURE — 99214 OFFICE O/P EST MOD 30 MIN: CPT | Performed by: FAMILY MEDICINE

## 2021-09-21 PROCEDURE — 3078F DIAST BP <80 MM HG: CPT | Performed by: FAMILY MEDICINE

## 2021-09-21 PROCEDURE — 3008F BODY MASS INDEX DOCD: CPT | Performed by: FAMILY MEDICINE

## 2021-09-21 PROCEDURE — G0008 ADMIN INFLUENZA VIRUS VAC: HCPCS | Performed by: FAMILY MEDICINE

## 2021-09-21 RX ORDER — ALPRAZOLAM 0.5 MG/1
TABLET ORAL
Qty: 15 TABLET | Refills: 0 | Status: CANCELLED | OUTPATIENT
Start: 2021-09-21

## 2021-09-21 RX ORDER — AMLODIPINE BESYLATE 5 MG/1
5 TABLET ORAL
Qty: 90 TABLET | Refills: 0 | Status: SHIPPED | OUTPATIENT
Start: 2021-09-21

## 2021-09-21 NOTE — PROGRESS NOTES
Subjective:   Patient ID: Rosi Horne is a 61year old female. HPI   59yr old female presents for f/u after recent ER visit on 9/15/21 for chest pain. Symptoms occurred the night before the ER visit.  She was getting ready to go to bed and felt an INSOMNIA 60 tablet 0   • ALPRAZolam 0.5 MG Oral Tab TAKE 1/2 TO 1 TABLET BY MOUTH DAILY AS NEEDED FOR ANXIETY 15 tablet 0   • TIZANIDINE HCL 2 MG Oral Tab TAKE 1 TABLET BY MOUTH TWICE DAILY AS NEEDED 60 tablet 2   • Progesterone (PROMETRIUM) 100 MG Oral Ca IV  Seasonal                  Tomato                  OTHER (SEE COMMENTS)  Zomig [Zolmitriptan]    ITCHING    Objective:   Physical Exam  Vitals and nursing note reviewed. Constitutional:       Appearance: Normal appearance.    HENT:      Head: Normoceph PRESERVATIVE FREE 0.5 ML

## 2021-09-24 ENCOUNTER — OFFICE VISIT (OUTPATIENT)
Dept: NEUROLOGY | Facility: CLINIC | Age: 66
End: 2021-09-24
Payer: MEDICARE

## 2021-09-24 VITALS
RESPIRATION RATE: 16 BRPM | SYSTOLIC BLOOD PRESSURE: 120 MMHG | WEIGHT: 180 LBS | HEART RATE: 66 BPM | DIASTOLIC BLOOD PRESSURE: 72 MMHG | BODY MASS INDEX: 29 KG/M2

## 2021-09-24 DIAGNOSIS — G43.701 CHRONIC MIGRAINE WITHOUT AURA WITH STATUS MIGRAINOSUS, NOT INTRACTABLE: Primary | ICD-10-CM

## 2021-09-24 PROCEDURE — 3074F SYST BP LT 130 MM HG: CPT | Performed by: OTHER

## 2021-09-24 PROCEDURE — 3078F DIAST BP <80 MM HG: CPT | Performed by: OTHER

## 2021-09-24 PROCEDURE — 64615 CHEMODENERV MUSC MIGRAINE: CPT | Performed by: OTHER

## 2021-09-24 NOTE — PROGRESS NOTES
Botox Reauthorization Questions:  1. Has the patient experienced a reduction in frequency of migraines since starting Botox? yes  a. If yes, by what percentage? 90%  2. Has the intensity of migraines decreased since starting Botox? yes  a.  If yes, by what

## 2021-09-28 ENCOUNTER — LAB ENCOUNTER (OUTPATIENT)
Dept: LAB | Age: 66
End: 2021-09-28
Attending: FAMILY MEDICINE
Payer: MEDICARE

## 2021-09-28 ENCOUNTER — LAB ENCOUNTER (OUTPATIENT)
Dept: LAB | Age: 66
End: 2021-09-28
Attending: Other
Payer: MEDICARE

## 2021-09-28 DIAGNOSIS — F33.42 MDD (MAJOR DEPRESSIVE DISORDER), RECURRENT, IN FULL REMISSION (HCC): ICD-10-CM

## 2021-09-28 DIAGNOSIS — E55.9 VITAMIN D DEFICIENCY: ICD-10-CM

## 2021-09-28 DIAGNOSIS — E78.2 MIXED HYPERLIPIDEMIA: ICD-10-CM

## 2021-09-28 DIAGNOSIS — L65.9 HAIR LOSS: ICD-10-CM

## 2021-09-28 DIAGNOSIS — R53.83 FATIGUE, UNSPECIFIED TYPE: ICD-10-CM

## 2021-09-28 DIAGNOSIS — I10 ESSENTIAL HYPERTENSION, BENIGN: ICD-10-CM

## 2021-09-28 PROCEDURE — 85025 COMPLETE CBC W/AUTO DIFF WBC: CPT

## 2021-09-28 PROCEDURE — 80053 COMPREHEN METABOLIC PANEL: CPT

## 2021-09-28 PROCEDURE — 82746 ASSAY OF FOLIC ACID SERUM: CPT

## 2021-09-28 PROCEDURE — 36415 COLL VENOUS BLD VENIPUNCTURE: CPT

## 2021-09-28 PROCEDURE — 82306 VITAMIN D 25 HYDROXY: CPT

## 2021-09-28 PROCEDURE — 82607 VITAMIN B-12: CPT

## 2021-09-28 PROCEDURE — 80061 LIPID PANEL: CPT

## 2021-09-28 PROCEDURE — 84443 ASSAY THYROID STIM HORMONE: CPT

## 2021-10-06 ENCOUNTER — TELEPHONE (OUTPATIENT)
Dept: NEUROLOGY | Facility: CLINIC | Age: 66
End: 2021-10-06

## 2021-10-06 DIAGNOSIS — G43.719 INTRACTABLE CHRONIC MIGRAINE WITHOUT AURA AND WITHOUT STATUS MIGRAINOSUS: Primary | ICD-10-CM

## 2021-10-06 NOTE — TELEPHONE ENCOUNTER
Patient called to advise that she had a 6 hour migraine. It has resolved, but she is concerned about her ability to manage the pain moving forward. Botox didn't work for her, so she would like to understand other options. Please call [phone].

## 2021-10-06 NOTE — TELEPHONE ENCOUNTER
S: Patient reporting she had a six hour migraine. ( Currently resolved) Wants alternate options for abortive and for migraine control. Does not feel that BOTOX working.     Reports 4 migraines since last BOTOX appt    B: Last BOTOX OV on 9/24/2021   Abortive

## 2021-10-07 ENCOUNTER — TELEPHONE (OUTPATIENT)
Dept: FAMILY MEDICINE CLINIC | Facility: CLINIC | Age: 66
End: 2021-10-07

## 2021-10-07 RX ORDER — RIMEGEPANT SULFATE 75 MG/75MG
75 TABLET, ORALLY DISINTEGRATING ORAL EVERY OTHER DAY
Qty: 16 TABLET | Refills: 1 | Status: SHIPPED | OUTPATIENT
Start: 2021-10-07 | End: 2021-11-11

## 2021-10-07 NOTE — TELEPHONE ENCOUNTER
Placed RX for preventative nurtec to [address] [place] [address] Dr. Santos has medicare so may not be approved. Left detailed VM (ok per HIPAA) for pt with above information.

## 2021-10-07 NOTE — TELEPHONE ENCOUNTER
PA form from [facility] received. Form filled out and faxed back with confirmation.

## 2021-10-07 NOTE — TELEPHONE ENCOUNTER
Donalynn Galeazzi, MD Sharron Bair Nurse 15 hours ago (4:38 PM)         Try Nurtec preventive dose- will need approval though

## 2021-10-08 ENCOUNTER — HOSPITAL ENCOUNTER (OUTPATIENT)
Dept: MRI IMAGING | Age: 66
Discharge: HOME | End: 2021-10-08
Attending: ANESTHESIOLOGY
Payer: MEDICARE

## 2021-10-08 DIAGNOSIS — M54.12 CERVICAL RADICULITIS: ICD-10-CM

## 2021-10-08 PROCEDURE — 72141 MRI NECK SPINE W/O DYE: CPT | Performed by: ANESTHESIOLOGY

## 2021-10-08 NOTE — TELEPHONE ENCOUNTER
Jasbir Romero, DO   10/8/2021  9:51 AM CDT Back to Top        Pls inform pt that labs demonstrate:  1. Elevated lipid panel but improved from previous, will need to come into the office to discuss further  2.  Vit d 23.4, she should be taking vit d3

## 2021-10-11 ENCOUNTER — TELEPHONE (OUTPATIENT)
Dept: PAIN CLINIC | Facility: CLINIC | Age: 66
End: 2021-10-11

## 2021-10-11 NOTE — TELEPHONE ENCOUNTER
Alfreda Bodo, MD Barabara Merlin, MANSOOR Rogel, please bring the patient back for a follow-up visit to discuss the MRI findings       Noted patient scheduled for a follow up visit with Dr Mary Kee 10/14/2021

## 2021-10-13 ENCOUNTER — TELEPHONE (OUTPATIENT)
Dept: FAMILY MEDICINE CLINIC | Facility: CLINIC | Age: 66
End: 2021-10-13

## 2021-10-13 RX ORDER — AMITRIPTYLINE HYDROCHLORIDE 75 MG/1
TABLET, FILM COATED ORAL
COMMUNITY
Start: 2021-10-04 | End: 2021-11-08

## 2021-10-14 ENCOUNTER — OFFICE VISIT (OUTPATIENT)
Dept: PAIN CLINIC | Facility: CLINIC | Age: 66
End: 2021-10-14
Payer: MEDICARE

## 2021-10-14 VITALS
WEIGHT: 180 LBS | HEART RATE: 90 BPM | RESPIRATION RATE: 16 BRPM | SYSTOLIC BLOOD PRESSURE: 120 MMHG | OXYGEN SATURATION: 97 % | DIASTOLIC BLOOD PRESSURE: 82 MMHG | BODY MASS INDEX: 29 KG/M2

## 2021-10-14 DIAGNOSIS — M19.012 PRIMARY OSTEOARTHRITIS OF LEFT SHOULDER: Primary | ICD-10-CM

## 2021-10-14 DIAGNOSIS — M47.812 ARTHROPATHY OF CERVICAL FACET JOINT: ICD-10-CM

## 2021-10-14 PROCEDURE — 3079F DIAST BP 80-89 MM HG: CPT | Performed by: ANESTHESIOLOGY

## 2021-10-14 PROCEDURE — 99215 OFFICE O/P EST HI 40 MIN: CPT | Performed by: ANESTHESIOLOGY

## 2021-10-14 PROCEDURE — 3074F SYST BP LT 130 MM HG: CPT | Performed by: ANESTHESIOLOGY

## 2021-10-14 NOTE — PROGRESS NOTES
Name: Viktor Mohan   : 1955   DOS:10/14/2021      Pain Clinic Follow Up Visit:   Viktor Mohan is a 61year old female who presents for recheck of her chronic neck pain and headache.   The patient had radiofrequency ablation of cervical med DAILY AS NEEDED FOR ANXIETY 15 tablet 0   • amLODIPine 5 MG Oral Tab Take 1 tablet (5 mg total) by mouth once daily. 90 tablet 0   • Naratriptan HCl 2.5 MG Oral Tab Take 1 tablet (2.5 mg total) by mouth as needed.  MAY REPEAT ONCE IN 4 HOURS 10 tablet 2   • Speed's and positive [place] test.  Negative apprehension test.  Moderate tenderness [facility] joint with direct palpation and crossarm testing. 5-/5 strength to resistance in all directions. MRI of cervical spine was reviewed.   MRI of cervical spine

## 2021-10-14 NOTE — PROGRESS NOTES
Patient presents in office today with reported pain in neck radiating into left shoulder.  Associated with migraines     Current pain level reported = 1/10    Last reported dose of tylenol yesterday, CBD cream yesterday       Narcotic Contract renewal na

## 2021-10-15 ENCOUNTER — TELEPHONE (OUTPATIENT)
Dept: NEUROLOGY | Facility: CLINIC | Age: 66
End: 2021-10-15

## 2021-10-15 DIAGNOSIS — M54.81 CERVICO-OCCIPITAL NEURALGIA: Primary | ICD-10-CM

## 2021-10-15 NOTE — TELEPHONE ENCOUNTER
Called [employer] spoke to Kody WATT who states request does not required authorization.   Reference #  [id number]   Prior authorization request completed for:  left subdeltoid bursa injection with intravenous sedation   Injection Series: 1   Authorization #

## 2021-10-15 NOTE — TELEPHONE ENCOUNTER
Ok to initiate PA for left diagnostic cervical facet joint injection at C5-C6 and C6-C7 level. New order not required.

## 2021-10-15 NOTE — TELEPHONE ENCOUNTER
Michael online to initiate authorization  Prior authorization request completed for: left diagnostic cervical facet joint injection at C5-C6 and C6-C7 level    Injection Series: 1   Authorization # [id number]  Authorization dates: 10/15/21 THRU 12/17/21  C

## 2021-10-15 NOTE — TELEPHONE ENCOUNTER
left diagnostic cervical facet joint injection at C4-C5, C5-C6 and C6-C7 level -pending information     Per [employer] Medicare bob: 2 levels are allowed per session     Advise on 2 preferred levels

## 2021-10-18 ENCOUNTER — TELEPHONE (OUTPATIENT)
Dept: NEUROLOGY | Facility: CLINIC | Age: 66
End: 2021-10-18

## 2021-10-18 NOTE — TELEPHONE ENCOUNTER
Question Answer   Anesthesia Type Local   Provider Brooke Grajeda   Location Lab   Procedure Other (please add comment)   Implants No   Medical clearance requested (will send to Pain Navigator) No   Patient has Medicare coverage?  No   Comments (Please list entire pr

## 2021-10-18 NOTE — TELEPHONE ENCOUNTER
Patient advised of insurance approval to proceed with injections and is agreeable to scheduling. Patient scheduled for procedure, pre-procedure instructions reviewed. Patient prefers conscious sedation.  Reviewed sedation instructions including fasting of 8 procedure including discharge instructions. • Please park in the [employer] parking garage and follow the signs to the [facility]. • Please bring your Insurance Card, Photo ID, List of Current Medications and Referral (if applicable) to your appointment.   Ch Insurance Authorization:   Most insurances are now requiring a preauthorization for all procedures.   In the event that your insurance does not authorize your procedure within 48 hours of the scheduled date, your procedure will be cancelled and reschedu

## 2021-10-18 NOTE — TELEPHONE ENCOUNTER
Question Answer   Anesthesia Type Sedation   Provider Dirk Carney   Location Lab   Procedure Facet   Laterality/Level left diagnostic cervical facet joint injection at C5-C6 and C6-C7 level under fluoroscopy with intravenous sedation   Implants No   Medical clear

## 2021-10-18 NOTE — TELEPHONE ENCOUNTER
Patient is requesting a call to discuss [facility] She reports that the mail order pharmacy we sent to is charging $94 for 13 pills. Please call [phone].

## 2021-10-18 NOTE — TELEPHONE ENCOUNTER
Spoke with pt, she needs to touch base with her  to determine off-work days. She will call back with an answer regarding 10/27.

## 2021-10-20 ENCOUNTER — TELEPHONE (OUTPATIENT)
Dept: NEUROLOGY | Facility: CLINIC | Age: 66
End: 2021-10-20

## 2021-10-20 NOTE — TELEPHONE ENCOUNTER
MRI SHOULDER, LEFT (CPT=73221) Additional information needed:    If we do not receive additional information, or a request for a Aonm-sr-Omfl discussion, to support an approval by the date specified, the information below will be used in making a determination. This information contains the reason why the request does not currently meet medical necessity, and the information needed to support an approval:    Once a denial is made the only option would be an appeal letter:      Based on Medicare National Coverage Determinations (NCD): 220.2 Magnetic   Resonance Imaging and evAmerican Hospital Association Musculoskeletal Imaging Guidelines Section(s): MS 19 Shoulder and 1.0 General Guidelines, we cannot approve this request. Your records show   that you have shoulder pain. The request cannot be approved because: You have not completed six weeks of treatment directed by your doctor that failed to   improve your symptoms. Supported treatments include (but are not limited to) medications for swelling or pain, physical therapy, and/or oral or injected steroids. You must provide results of an x-ray taken after your symptoms started or changed that   show a need for further imaging. There is no record of follow-up contact (in person, by phone, by mail, or by messaging)   with your doctor after completing treatment that failed to improve your symptoms. There is no record that you completed conservative treatment within the last three   months. Supported treatments include (but are not limited to) medications for swelling or   pain, physical therapy, and/or oral or injected steroids. Please provide the requested clinical information Before 10/22/2021    You may schedule a Ancy-mx-Twjh discussion regarding the case.  You may schedule a discussion with a Medical Director by calling [phone], select the prompt associated with the request type and enter in reference number [id number]

## 2021-10-25 ENCOUNTER — LAB ENCOUNTER (OUTPATIENT)
Dept: LAB | Facility: HOSPITAL | Age: 66
End: 2021-10-25
Attending: ANESTHESIOLOGY
Payer: MEDICARE

## 2021-10-25 DIAGNOSIS — M54.81 CERVICO-OCCIPITAL NEURALGIA: ICD-10-CM

## 2021-10-25 NOTE — TELEPHONE ENCOUNTER
Pt states the copay just isnt in her budget right now. She states she is getting pain injections by Dr Tootie Harris. She will call us back if she decides she wants to look at other preventative options.

## 2021-10-25 NOTE — TELEPHONE ENCOUNTER
Horace Palmer MD  You 18 hours ago (3:50 PM)     Lida Lawrence, please send her for physical therapy for 6 weeks    Message text      Contacted pt to relay info below. Pt does not want to proceed with PT as she feels this will not be helpful. Pt states she would prefer to move forward with the recommended injections. Advised pt that the cervical facets are scheduled. Pt states she would like to have the shoulder inj done on the same day. Reviewed chart and noted that subdelt bursa is auth'd and pt elected not to schedule. Discussed dates and pt states she will need to call back to schedule.

## 2021-10-26 ENCOUNTER — TELEPHONE (OUTPATIENT)
Dept: NEUROLOGY | Facility: CLINIC | Age: 66
End: 2021-10-26

## 2021-10-26 NOTE — TELEPHONE ENCOUNTER
pt wanted to inform Dr that she is having a procedure with Dr Sameer Katz tomorrow to help with migraine pain; pt unsure if she wants to continue with Botox, she believes she had a reaction last time.

## 2021-10-26 NOTE — TELEPHONE ENCOUNTER
Per chart review, pt having CERVICAL FACET INJECTION LEFT C5/6 AND C6/7 WITH SEDATION tomorrow. Pt reports severe itchiness to back to head at site of injections post 9/24/21 Botox administration.     States she will call to schedule Botox in the futur

## 2021-10-27 ENCOUNTER — HOSPITAL ENCOUNTER (OUTPATIENT)
Facility: HOSPITAL | Age: 66
Setting detail: HOSPITAL OUTPATIENT SURGERY
Discharge: HOME | End: 2021-10-27
Attending: ANESTHESIOLOGY | Admitting: ANESTHESIOLOGY
Payer: MEDICARE

## 2021-10-27 ENCOUNTER — APPOINTMENT (OUTPATIENT)
Dept: GENERAL RADIOLOGY | Facility: HOSPITAL | Age: 66
End: 2021-10-27
Attending: ANESTHESIOLOGY
Payer: MEDICARE

## 2021-10-27 VITALS
HEART RATE: 82 BPM | TEMPERATURE: 98 F | RESPIRATION RATE: 16 BRPM | OXYGEN SATURATION: 100 % | SYSTOLIC BLOOD PRESSURE: 145 MMHG | DIASTOLIC BLOOD PRESSURE: 81 MMHG

## 2021-10-27 DIAGNOSIS — M54.81 CERVICO-OCCIPITAL NEURALGIA: ICD-10-CM

## 2021-10-27 PROCEDURE — BR141ZZ FLUOROSCOPY OF CERVICAL FACET JOINT(S) USING LOW OSMOLAR CONTRAST: ICD-10-PCS | Performed by: ANESTHESIOLOGY

## 2021-10-27 PROCEDURE — 3E0U3BZ INTRODUCTION OF ANESTHETIC AGENT INTO JOINTS, PERCUTANEOUS APPROACH: ICD-10-PCS | Performed by: ANESTHESIOLOGY

## 2021-10-27 PROCEDURE — 99152 MOD SED SAME PHYS/QHP 5/>YRS: CPT | Performed by: ANESTHESIOLOGY

## 2021-10-27 PROCEDURE — 3E0U33Z INTRODUCTION OF ANTI-INFLAMMATORY INTO JOINTS, PERCUTANEOUS APPROACH: ICD-10-PCS | Performed by: ANESTHESIOLOGY

## 2021-10-27 RX ORDER — LIDOCAINE HYDROCHLORIDE 10 MG/ML
INJECTION, SOLUTION EPIDURAL; INFILTRATION; INTRACAUDAL; PERINEURAL
Status: DISCONTINUED | OUTPATIENT
Start: 2021-10-27 | End: 2021-10-27

## 2021-10-27 RX ORDER — SODIUM CHLORIDE 9 MG/ML
INJECTION INTRAVENOUS
Status: DISCONTINUED | OUTPATIENT
Start: 2021-10-27 | End: 2021-10-27

## 2021-10-27 RX ORDER — ONDANSETRON 2 MG/ML
4 INJECTION INTRAMUSCULAR; INTRAVENOUS ONCE AS NEEDED
Status: DISCONTINUED | OUTPATIENT
Start: 2021-10-27 | End: 2021-10-27

## 2021-10-27 RX ORDER — MIDAZOLAM HYDROCHLORIDE 1 MG/ML
INJECTION INTRAMUSCULAR; INTRAVENOUS
Status: DISCONTINUED | OUTPATIENT
Start: 2021-10-27 | End: 2021-10-27

## 2021-10-27 RX ORDER — DIPHENHYDRAMINE HYDROCHLORIDE 50 MG/ML
50 INJECTION INTRAMUSCULAR; INTRAVENOUS ONCE AS NEEDED
Status: DISCONTINUED | OUTPATIENT
Start: 2021-10-27 | End: 2021-10-27

## 2021-10-27 RX ORDER — DEXAMETHASONE SODIUM PHOSPHATE 10 MG/ML
INJECTION, SOLUTION INTRAMUSCULAR; INTRAVENOUS
Status: DISCONTINUED | OUTPATIENT
Start: 2021-10-27 | End: 2021-10-27

## 2021-10-27 RX ORDER — SODIUM CHLORIDE, SODIUM LACTATE, POTASSIUM CHLORIDE, CALCIUM CHLORIDE 600; 310; 30; 20 MG/100ML; MG/100ML; MG/100ML; MG/100ML
100 INJECTION, SOLUTION INTRAVENOUS CONTINUOUS
Status: DISCONTINUED | OUTPATIENT
Start: 2021-10-27 | End: 2021-10-27

## 2021-10-27 NOTE — OPERATIVE REPORT
[facility]  Operative Report  10/27/2021     Nic Will Patient Status:  Hospital Outpatient Surgery    1955 MRN [id number]   Location [address] [address] Attending No att. providers found   Hosp Day # 0 PCP Rufino Bejarano each corresponding facet joint atraumatically at left C5-C6 and C6-C7 level under fluoroscopic guidance.   Following negative aspiration for CSF and blood, approximately, 0.5 mL of 1% lidocaine with 10 mg of Decadron was injected into each joint without com

## 2021-10-27 NOTE — H&P
History & Physical Examination    Patient Name: Allen Mitchell  MRN: [id number]  CSN: [id number]  YOB: 1955    Pre-Operative Diagnosis:  Cervico-occipital neuralgia [M54.81]    Present Illness: 29-year-old female patient with chronic neck p erythema consistent with nonspecific colitis   • Decreased sexual desire 2/98   • Depression with anxiety     psychiatry: Dr. Lisha James   • Diverticulosis 4/06    scattered, minimal   • Endometriosis     s/p ablation therapy   • Fibromyalgia    • Histor Disorder Mother         COPD, emphysema   • Colon Cancer Maternal Grandmother         colon   • Cancer Other         ovarian and breast   • Breast Cancer Paternal Aunt 21     Social History    Tobacco Use      Smoking status: Never Smoker      Smokeless to

## 2021-11-04 ENCOUNTER — TELEPHONE (OUTPATIENT)
Dept: PAIN CLINIC | Facility: CLINIC | Age: 66
End: 2021-11-04

## 2021-11-04 NOTE — TELEPHONE ENCOUNTER
Pt called and states she had procedure on 10/27 and was told to call back to reschedule a 2nd procedure

## 2021-11-08 ENCOUNTER — TELEPHONE (OUTPATIENT)
Dept: PAIN CLINIC | Facility: CLINIC | Age: 66
End: 2021-11-08

## 2021-11-08 DIAGNOSIS — M47.812 ARTHROPATHY OF CERVICAL FACET JOINT: Primary | ICD-10-CM

## 2021-11-08 NOTE — TELEPHONE ENCOUNTER
Spoke with pt when I attempted to schedule her left subdeltoid bursa injection and patient stated that her shoulder is not the problem anymore, and that it's not bothering her. Pt does not want to go through with that recommended procedure at this time.

## 2021-11-08 NOTE — TELEPHONE ENCOUNTER
Pt is returning someone's call from our office. Pt received a voicemail she believes on Friday. Please advise.

## 2021-11-08 NOTE — TELEPHONE ENCOUNTER
Scott Tamez MP    10:17 AM  Note  Pt is returning someone's call from our office. Pt received a voicemail she believes on Friday. Please advise.

## 2021-11-10 NOTE — TELEPHONE ENCOUNTER
Called pt to discuss the new referral that has been placed for her regarding her upper neck into her skull pain.  Dr. Clarissa Justice has placed Left diagnostic cervical facet joint injection at C4-C5, C5-C6 and C6-C7 which is a repeat of what the pt had on Oct 27, 20

## 2021-11-10 NOTE — TELEPHONE ENCOUNTER
Sulema Oneill MD  You 13 minutes ago (9:33 AM)       Please bring her back for a follow-up visit    Message text      Trenton Hof Dr. Denver San has placed a new referral and we are awaiting PA at this time.

## 2021-11-11 ENCOUNTER — TELEPHONE (OUTPATIENT)
Dept: NEUROLOGY | Facility: CLINIC | Age: 66
End: 2021-11-11

## 2021-11-11 DIAGNOSIS — G43.719 INTRACTABLE CHRONIC MIGRAINE WITHOUT AURA AND WITHOUT STATUS MIGRAINOSUS: ICD-10-CM

## 2021-11-11 RX ORDER — RIMEGEPANT SULFATE 75 MG/75MG
75 TABLET, ORALLY DISINTEGRATING ORAL EVERY OTHER DAY
Qty: 16 TABLET | Refills: 1 | Status: SHIPPED | OUTPATIENT
Start: 2021-11-11 | End: 2021-12-16

## 2021-11-11 NOTE — TELEPHONE ENCOUNTER
Pt given information for [facility], will call back with how she would like to proceed

## 2021-11-11 NOTE — TELEPHONE ENCOUNTER
pt would like to know if it is possible to get a sample of Nurtec or assistance with paying for it as it is very expensive; pls call

## 2021-11-11 NOTE — TELEPHONE ENCOUNTER
Pt only needs refill if unable to get a sample or assistance with Nurtec; see alt TE    Patient calling to [facility] [address] [address] [facility], [phone]

## 2021-11-11 NOTE — TELEPHONE ENCOUNTER
Pt called back and does not feel comfortable giving personal information to [facility]. [facility] where the copay was $93. RX sent per written order from last RX that was never filled in October.

## 2021-11-16 NOTE — TELEPHONE ENCOUNTER
Called [facility], spoke to nurse reviewer Noman Puckett who state request for   New Request: Left diagnostic cervical facet joint injection at C5-C6 and C6-C7 level under fluoroscopy is authorized.     Prior authorization request completed for: Left diagnostic ce

## 2021-11-16 NOTE — TELEPHONE ENCOUNTER
Left diagnostic cervical facet joint injection at C4-C5, C5-C6 and C6-C7 level under fluoroscopy CPT CODE: 62946,85590-BNUJSDT levels     Per BCBS Medicare guprincess: 2 levels are allowed per session      Advise on 2 preferred levels

## 2021-11-17 NOTE — TELEPHONE ENCOUNTER
Question Answer   Anesthesia Type Sedation   Provider [facility] Dimas [facility]   Procedure Facet   Laterality/Level Left diagnostic cervical facet joint injection at C5-C6 and C6-C7 level under fluoroscopy   Implants No   Medical clearance requested (will send

## 2021-11-23 ENCOUNTER — TELEPHONE (OUTPATIENT)
Dept: FAMILY MEDICINE CLINIC | Facility: CLINIC | Age: 66
End: 2021-11-23

## 2021-11-23 ENCOUNTER — TELEMEDICINE (OUTPATIENT)
Dept: FAMILY MEDICINE CLINIC | Facility: CLINIC | Age: 66
End: 2021-11-23
Payer: MEDICARE

## 2021-11-23 DIAGNOSIS — J30.2 SEASONAL ALLERGIES: ICD-10-CM

## 2021-11-23 DIAGNOSIS — Z20.822 LAB TEST NEGATIVE FOR COVID-19 VIRUS: ICD-10-CM

## 2021-11-23 DIAGNOSIS — J20.9 ACUTE BRONCHITIS, UNSPECIFIED ORGANISM: Primary | ICD-10-CM

## 2021-11-23 PROCEDURE — 99214 OFFICE O/P EST MOD 30 MIN: CPT | Performed by: FAMILY MEDICINE

## 2021-11-23 RX ORDER — EZETIMIBE 10 MG/1
TABLET ORAL
COMMUNITY
Start: 2021-11-16

## 2021-11-23 RX ORDER — METOPROLOL TARTRATE 100 MG/1
TABLET ORAL
COMMUNITY
Start: 2021-11-16

## 2021-11-23 RX ORDER — METHYLPREDNISOLONE 4 MG/1
TABLET ORAL
Qty: 1 EACH | Refills: 0 | Status: SHIPPED | OUTPATIENT
Start: 2021-11-23 | End: 2022-01-04 | Stop reason: ALTCHOICE

## 2021-11-23 RX ORDER — ALBUTEROL SULFATE 90 UG/1
2 AEROSOL, METERED RESPIRATORY (INHALATION) EVERY 6 HOURS PRN
Qty: 18 G | Refills: 1 | Status: SHIPPED | OUTPATIENT
Start: 2021-11-23

## 2021-11-23 NOTE — TELEPHONE ENCOUNTER
Pt calling stating she gets this sinus cold in the fall and spring. Is hosting thanksgiving and wants to get tested and get a steroid like she normally does. Advised that our tests are not rapid and it can take 48 hours + to come back.  Pt has been doing ne

## 2021-11-23 NOTE — TELEPHONE ENCOUNTER
Called patient who states she typically gets sinus/allergy/asthma symptoms this time of the year. Has been feeling tightness in her chest with a little wheeze. Has been doing neb tx 2x per day and inhaler occasionally.   Inhaler sometimes makes her feel j

## 2021-11-23 NOTE — PROGRESS NOTES
Video visit  Please note that the following visit was completed using two-way, real-time interactive audio and video communication.   This has been done in good leonila to provide continuity of care in the best interest of the provider-patient relationship, d • amLODIPine 5 MG Oral Tab Take 1 tablet (5 mg total) by mouth once daily. 90 tablet 0   • Naratriptan HCl 2.5 MG Oral Tab Take 1 tablet (2.5 mg total) by mouth as needed.  MAY REPEAT ONCE IN 4 HOURS 10 tablet 2   • TIZANIDINE HCL 2 MG Oral Tab TAKE 1 TAB fracture  internal fixation of the right hip   • HTN (hypertension)    • Hyperlipidemia    • IBS (irritable bowel syndrome) 03/17/2006    chronic, recurrent.  GI: Dr. Tim Agustin   • Intervertebral disc protrusion 4/6/05    L5/S1 small central protrusion 0.0 - 1.0 standard drinks      Comment: occasional    Drug use: Yes      Types: Cannabis      Comment: salve        REVIEW OF SYSTEMS:   GENERAL: feels well otherwise  HEENT: per hpi  LUNGS: per hpi  CARDIOVASCULAR: denies chest pain on exertion  GI: no na

## 2021-12-08 ENCOUNTER — PATIENT MESSAGE (OUTPATIENT)
Dept: NEUROLOGY | Facility: CLINIC | Age: 66
End: 2021-12-08

## 2021-12-08 DIAGNOSIS — G43.719 INTRACTABLE CHRONIC MIGRAINE WITHOUT AURA AND WITHOUT STATUS MIGRAINOSUS: ICD-10-CM

## 2021-12-08 NOTE — TELEPHONE ENCOUNTER
From: Allen Mitchell  To: Laureen Marino MD  Sent: 12/8/2021 3:15 PM CST  Subject: [facility]    Just wanted to report that I am significantly improved with the number of migraines since the start of this new drug.    Thank you  Allen Mitchell

## 2021-12-09 ENCOUNTER — TELEPHONE (OUTPATIENT)
Dept: FAMILY MEDICINE CLINIC | Facility: CLINIC | Age: 66
End: 2021-12-09

## 2021-12-09 NOTE — TELEPHONE ENCOUNTER
Spoke with pt who stated that she is no longer interested in getting this procedure due to a new medication for migraines and her amitriptyline has been increased.  Pt states that she is doing pretty well and doesn't feel the injection is appropriate at [facility]

## 2021-12-09 NOTE — TELEPHONE ENCOUNTER
Pt called stating she's been trying to get the \"Clonazapam\" refilled.  (prescribed by Dr. Eduard Gregorio). She said no response from that office. Wants Dr Ronaldo Nunez to fill in the meantime.

## 2021-12-14 NOTE — TELEPHONE ENCOUNTER
To: SOCORRO FORD NURSE      From: Zak Alaniz      Created: 12/14/2021 9:56 AM      In past few weeks I have been getting stomach pain and yesterday it was pretty intense on the upper left side under my breast. I do have IBS but it’s been a while since

## 2021-12-14 NOTE — TELEPHONE ENCOUNTER
Tammi Fox MD  [facility] Nurse 6 minutes ago (12:02 PM)           Nurtec can cause some GI side effects- Abdominal pain (2%), dyspepsia (2%), nausea (2% to 3%)     It is possible since you have IBS taking Nurtec just precipitated it.      You c

## 2021-12-15 ENCOUNTER — ORDER TRANSCRIPTION (OUTPATIENT)
Dept: ADMINISTRATIVE | Facility: HOSPITAL | Age: 66
End: 2021-12-15

## 2021-12-15 DIAGNOSIS — R06.02 SOB (SHORTNESS OF BREATH): ICD-10-CM

## 2021-12-15 DIAGNOSIS — R07.89 ATYPICAL CHEST PAIN: Primary | ICD-10-CM

## 2021-12-15 DIAGNOSIS — G50.1 ATYPICAL FACIAL PAIN: ICD-10-CM

## 2021-12-15 DIAGNOSIS — G43.011 INTRACTABLE MIGRAINE WITHOUT AURA AND WITH STATUS MIGRAINOSUS: ICD-10-CM

## 2021-12-15 DIAGNOSIS — G43.701 CHRONIC MIGRAINE WITHOUT AURA WITH STATUS MIGRAINOSUS, NOT INTRACTABLE: ICD-10-CM

## 2021-12-15 DIAGNOSIS — I63.9 PERSISTENT MIGRAINE AURA WITH CEREBRAL INFARCTION AND WITHOUT STATUS MIGRAINOSUS, NOT INTRACTABLE (HCC): ICD-10-CM

## 2021-12-15 DIAGNOSIS — I25.10 CORONARY ATHEROSCLEROSIS OF NATIVE CORONARY ARTERY: ICD-10-CM

## 2021-12-15 DIAGNOSIS — G43.711 INTRACTABLE CHRONIC MIGRAINE WITHOUT AURA AND WITH STATUS MIGRAINOSUS: ICD-10-CM

## 2021-12-15 DIAGNOSIS — G43.609 PERSISTENT MIGRAINE AURA WITH CEREBRAL INFARCTION AND WITHOUT STATUS MIGRAINOSUS, NOT INTRACTABLE (HCC): ICD-10-CM

## 2021-12-15 DIAGNOSIS — G43.719 INTRACTABLE CHRONIC MIGRAINE WITHOUT AURA AND WITHOUT STATUS MIGRAINOSUS: ICD-10-CM

## 2021-12-15 DIAGNOSIS — G43.519 MIGRAINE AURA, PERSISTENT, INTRACTABLE: ICD-10-CM

## 2021-12-15 DIAGNOSIS — M54.81 CERVICO-OCCIPITAL NEURALGIA: ICD-10-CM

## 2021-12-16 ENCOUNTER — TELEPHONE (OUTPATIENT)
Dept: NEUROLOGY | Facility: CLINIC | Age: 66
End: 2021-12-16

## 2021-12-16 DIAGNOSIS — G43.701 CHRONIC MIGRAINE WITHOUT AURA WITH STATUS MIGRAINOSUS, NOT INTRACTABLE: Primary | ICD-10-CM

## 2021-12-16 RX ORDER — NARATRIPTAN 2.5 MG/1
TABLET ORAL
Qty: 10 TABLET | Refills: 2 | Status: SHIPPED | OUTPATIENT
Start: 2021-12-16

## 2021-12-16 RX ORDER — RIMEGEPANT SULFATE 75 MG/75MG
75 TABLET, ORALLY DISINTEGRATING ORAL EVERY OTHER DAY
Qty: 16 TABLET | Refills: 1 | Status: SHIPPED | OUTPATIENT
Start: 2021-12-16 | End: 2022-01-15

## 2021-12-16 NOTE — TELEPHONE ENCOUNTER
Per chart review, Naratriptan is pended for provider review, [facility] and [facility].   LMTCB

## 2021-12-16 NOTE — TELEPHONE ENCOUNTER
Medication: NARATRIPTAN HCL 2.5 MG Oral Tab     Date of last refill: 08/16/2021 (#10/2)  [place]     Last office visit: 09/24/21  Due back to clinic per last office note:  12 weeks  Date next office visit scheduled:

## 2021-12-16 NOTE — TELEPHONE ENCOUNTER
Pt calling with questions about her Nurtec prescription. Pt states that she heard from [employer] that her copay would be \"in the 1000s\".  She then called [facility] and was informed her copay there would be $41 but she would not get t

## 2022-01-04 PROBLEM — F33.0 MDD (MAJOR DEPRESSIVE DISORDER), RECURRENT EPISODE, MILD: Status: ACTIVE | Noted: 2022-01-04

## 2022-01-04 PROBLEM — F33.0 MDD (MAJOR DEPRESSIVE DISORDER), RECURRENT EPISODE, MILD (HCC): Status: ACTIVE | Noted: 2022-01-04

## 2022-01-12 NOTE — IMAGING NOTE
Pt called and LVM and instructed to arrive 45 min prior to CTA gated study on 1/14/22.  Park in the [employer] parking garage, eat light breakfast/lunch, hydrate, hold caffeine/decaf/chocolate x 12 hrs prior, hold long acting nitrates, take all meds especially b

## 2022-01-14 ENCOUNTER — HOSPITAL ENCOUNTER (OUTPATIENT)
Dept: CT IMAGING | Facility: HOSPITAL | Age: 67
Discharge: HOME | End: 2022-01-14
Attending: INTERNAL MEDICINE
Payer: MEDICARE

## 2022-01-14 VITALS — SYSTOLIC BLOOD PRESSURE: 106 MMHG | DIASTOLIC BLOOD PRESSURE: 66 MMHG | HEART RATE: 62 BPM

## 2022-01-14 DIAGNOSIS — I25.10 CORONARY ATHEROSCLEROSIS OF NATIVE CORONARY ARTERY: ICD-10-CM

## 2022-01-14 DIAGNOSIS — R07.89 ATYPICAL CHEST PAIN: ICD-10-CM

## 2022-01-14 DIAGNOSIS — R06.02 SOB (SHORTNESS OF BREATH): ICD-10-CM

## 2022-01-14 LAB — CREAT BLD-MCNC: 0.8 MG/DL

## 2022-01-14 PROCEDURE — 82565 ASSAY OF CREATININE: CPT

## 2022-01-14 PROCEDURE — 75574 CT ANGIO HRT W/3D IMAGE: CPT | Performed by: INTERNAL MEDICINE

## 2022-01-14 RX ORDER — NITROGLYCERIN 0.4 MG/1
TABLET SUBLINGUAL
Status: COMPLETED
Start: 2022-01-14 | End: 2022-01-14

## 2022-01-14 RX ORDER — METOPROLOL TARTRATE 5 MG/5ML
INJECTION INTRAVENOUS
Status: DISCONTINUED
Start: 2022-01-14 | End: 2022-01-14 | Stop reason: WASHOUT

## 2022-01-14 RX ADMIN — NITROGLYCERIN 0.4 MG: 0.4 TABLET SUBLINGUAL at 13:31:00

## 2022-01-14 NOTE — IMAGING NOTE
Received Akin Rios to CT Rm 4 working with [employer]. Verified name, , allergies. Pt denies use of long acting nitrates like, Imdur, Cialis, Levitra and Viagra. IV access with 18G angiocath to right antecubital area.  O2 applied via nasal ca

## 2022-01-24 ENCOUNTER — TELEPHONE (OUTPATIENT)
Dept: FAMILY MEDICINE CLINIC | Facility: CLINIC | Age: 67
End: 2022-01-24

## 2022-01-24 DIAGNOSIS — J98.11 ATELECTASIS: Primary | ICD-10-CM

## 2022-01-24 NOTE — TELEPHONE ENCOUNTER
I looked at the CT scan, there is no spot on the lung but he has bilateral dependent atelectasis  We can repeat the chest x-ray

## 2022-01-24 NOTE — TELEPHONE ENCOUNTER
had heart scan done at [facility] in [place] and spot on lunch appeard in results. Maddison Vegas recommended she call PCP for further evaluation for patially collapsed lung? Feels some sinus pressure, no SOB.

## 2022-01-25 ENCOUNTER — PATIENT MESSAGE (OUTPATIENT)
Dept: FAMILY MEDICINE CLINIC | Facility: CLINIC | Age: 67
End: 2022-01-25

## 2022-01-25 ENCOUNTER — TELEPHONE (OUTPATIENT)
Dept: FAMILY MEDICINE CLINIC | Facility: CLINIC | Age: 67
End: 2022-01-25

## 2022-01-25 NOTE — TELEPHONE ENCOUNTER
[facility] requesting return call. Patient returned call right away. States her sinuses are plugged up and she can't even breathe through her nose. Denies fever cough, or chest congestion.  Has been taking tylenol sinus med and using azelastine nasal spray pr

## 2022-01-25 NOTE — TELEPHONE ENCOUNTER
From: Akin Rios  To: Lizbet SorensenDO  Sent: 1/25/2022 10:26 AM CST  Subject: Test results question     I went for a cardiac scan and although the results are ok they diagnosed bibasilar at [facility]. Can  call me back about this?   [place] [employer] [facility]

## 2022-01-25 NOTE — TELEPHONE ENCOUNTER
Pt calling stating that she has had what she believes is a sinus infection for over a week. Took at home test which came back negative. Has been taking OTC mucinex and steroid nose spray but still not getting better.  Would like a video visit with first inna

## 2022-01-31 ENCOUNTER — PATIENT MESSAGE (OUTPATIENT)
Dept: FAMILY MEDICINE CLINIC | Facility: CLINIC | Age: 67
End: 2022-01-31

## 2022-01-31 NOTE — TELEPHONE ENCOUNTER
Spoke to patient who states she has been doing all of the symptomatic treatment discussed last week. Has been experiencing a severe sinus HA. Took part of a left over Medrol dose yudith from last November and felt a little better this morning.   Has been gena

## 2022-02-01 ENCOUNTER — TELEPHONE (OUTPATIENT)
Dept: SURGERY | Facility: CLINIC | Age: 67
End: 2022-02-01

## 2022-02-01 NOTE — TELEPHONE ENCOUNTER
Pt states Nurtec rx is only allowing 8 total a month but pt was instructed to take every other day, please call to discuss.

## 2022-02-02 ENCOUNTER — TELEMEDICINE (OUTPATIENT)
Dept: FAMILY MEDICINE CLINIC | Facility: CLINIC | Age: 67
End: 2022-02-02
Payer: MEDICARE

## 2022-02-02 DIAGNOSIS — J01.40 ACUTE NON-RECURRENT PANSINUSITIS: Primary | ICD-10-CM

## 2022-02-02 PROCEDURE — 99213 OFFICE O/P EST LOW 20 MIN: CPT | Performed by: FAMILY MEDICINE

## 2022-02-02 RX ORDER — LEVOFLOXACIN 500 MG/1
500 TABLET, FILM COATED ORAL DAILY
Qty: 10 TABLET | Refills: 0 | Status: SHIPPED | OUTPATIENT
Start: 2022-02-02 | End: 2022-02-08

## 2022-02-02 NOTE — TELEPHONE ENCOUNTER
Spoke with keon at [facility] who states patient currently has 2 active prescriptions and 1 needs to be cancelled. Spoke with Radha at [facility] and prescription cancelled. Patient notified of above.

## 2022-02-04 NOTE — TELEPHONE ENCOUNTER
Spoke with patient who states the Naratriptan she received from the pharmacy was by a different  (was not in blister pack like previous prescriptions) and she experienced the feeling of her throat closing. She took 1 Nurtec and the throat closing sensation went away within 5 minutes. Patient is wondering if she can use the Triptan as an abortive while taking the Nurtec every other day? If she can, she will make sure she does not take Naratriptan from last .

## 2022-02-04 NOTE — TELEPHONE ENCOUNTER
Pt calls and states that she spoke with a nurse the other day who told her that the situation with the [facility] was all set and ready to go for her just to go to the pharmacy and only receive 6 tablets instead of the 15. Pt also states that she had a severe reaction with using the NARATRIPTAN HCL 2.5 MG, and her throat  Started to close up.  Pt is asking for a return call

## 2022-02-07 ENCOUNTER — PATIENT MESSAGE (OUTPATIENT)
Dept: NEUROLOGY | Facility: CLINIC | Age: 67
End: 2022-02-07

## 2022-02-07 NOTE — TELEPHONE ENCOUNTER
MD Zahida Quiroz [place] Nurse 6 minutes ago (12:19 PM)     Ideally she shouldn't particularly Naratriptan given severe reaction. She take Nurtec every other day and may be Fioricet as rescue agent. Thanks!       LMTCB, sent via [employer]

## 2022-02-08 ENCOUNTER — HOSPITAL ENCOUNTER (OUTPATIENT)
Dept: GENERAL RADIOLOGY | Facility: HOSPITAL | Age: 67
Discharge: HOME | End: 2022-02-08
Attending: FAMILY MEDICINE
Payer: MEDICARE

## 2022-02-08 ENCOUNTER — OFFICE VISIT (OUTPATIENT)
Dept: PAIN CLINIC | Facility: CLINIC | Age: 67
End: 2022-02-08
Payer: MEDICARE

## 2022-02-08 VITALS
OXYGEN SATURATION: 97 % | WEIGHT: 185 LBS | HEART RATE: 87 BPM | SYSTOLIC BLOOD PRESSURE: 106 MMHG | BODY MASS INDEX: 30 KG/M2 | DIASTOLIC BLOOD PRESSURE: 72 MMHG

## 2022-02-08 DIAGNOSIS — J98.11 ATELECTASIS: ICD-10-CM

## 2022-02-08 DIAGNOSIS — M19.011 PRIMARY OSTEOARTHRITIS OF RIGHT SHOULDER: Primary | ICD-10-CM

## 2022-02-08 PROCEDURE — 3074F SYST BP LT 130 MM HG: CPT | Performed by: ANESTHESIOLOGY

## 2022-02-08 PROCEDURE — 99212 OFFICE O/P EST SF 10 MIN: CPT | Performed by: ANESTHESIOLOGY

## 2022-02-08 PROCEDURE — 71046 X-RAY EXAM CHEST 2 VIEWS: CPT | Performed by: FAMILY MEDICINE

## 2022-02-08 PROCEDURE — 3078F DIAST BP <80 MM HG: CPT | Performed by: ANESTHESIOLOGY

## 2022-02-08 RX ORDER — MELOXICAM 15 MG/1
15 TABLET ORAL DAILY
Qty: 30 TABLET | Refills: 0 | Status: SHIPPED | OUTPATIENT
Start: 2022-02-08

## 2022-02-08 RX ORDER — RIMEGEPANT SULFATE 75 MG/75MG
TABLET, ORALLY DISINTEGRATING ORAL
COMMUNITY
Start: 2022-02-04 | End: 2022-03-03

## 2022-02-08 NOTE — TELEPHONE ENCOUNTER
From: Arlet Alamo  To: Adriano Giordanocock  Sent: 2/7/2022 12:31 PM CST  Subject: Naratriptan    Dominik Crespo,   says that she does not think that you should take the Nurtec, given the reaction. She says to continue the Nurtec every other day, and that Fioricet can be ordered as needed. Is this a medication you have at home? If not, we can order it fr you!     Please let us know,  MANSOOR Roldan

## 2022-02-08 NOTE — PROGRESS NOTES
Patient presents in office today with reported pain in shoulders, right arm    Current pain level reported = 6-7/10    Last reported dose of n/a      Narcotic Contract renewal n/a    Urine Drug screen n/a

## 2022-02-10 ENCOUNTER — TELEPHONE (OUTPATIENT)
Dept: NEUROLOGY | Facility: CLINIC | Age: 67
End: 2022-02-10

## 2022-02-10 NOTE — TELEPHONE ENCOUNTER
Question Answer   Anesthesia Type Local   Provider Nuno Serrano   Location Lab   Procedure Other (please add comment)   Implants No   Medical clearance requested (will send to Pain Navigator) No   Patient has Medicare coverage?  No   Comments (Please list entire procedure name here.) right shoulder joint and right subdeltoid bursa injection

## 2022-02-10 NOTE — TELEPHONE ENCOUNTER
Prior authorization request completed for: right shoulder joint and right subdeltoid bursa injection   Authorization # NO PRIOR AUTHORIZATION REQUIRED  Authorization dates: N/A  CPT codes approved: 75691  Number of visits/dates of service approved: 1  Physician: Dr. Renetta Souza   Location: Marlin Martinez to schedule       Poonam Justice at [facility] / Ref # [id number]

## 2022-02-16 ENCOUNTER — HOSPITAL ENCOUNTER (OUTPATIENT)
Facility: HOSPITAL | Age: 67
Setting detail: HOSPITAL OUTPATIENT SURGERY
Discharge: HOME | End: 2022-02-16
Attending: ANESTHESIOLOGY | Admitting: ANESTHESIOLOGY
Payer: MEDICARE

## 2022-02-16 ENCOUNTER — APPOINTMENT (OUTPATIENT)
Dept: GENERAL RADIOLOGY | Facility: HOSPITAL | Age: 67
End: 2022-02-16
Attending: ANESTHESIOLOGY
Payer: MEDICARE

## 2022-02-16 VITALS
OXYGEN SATURATION: 99 % | DIASTOLIC BLOOD PRESSURE: 76 MMHG | WEIGHT: 185 LBS | RESPIRATION RATE: 16 BRPM | TEMPERATURE: 97 F | SYSTOLIC BLOOD PRESSURE: 131 MMHG | HEIGHT: 66 IN | HEART RATE: 93 BPM | BODY MASS INDEX: 29.73 KG/M2

## 2022-02-16 DIAGNOSIS — G89.29 CHRONIC RIGHT SHOULDER PAIN: ICD-10-CM

## 2022-02-16 DIAGNOSIS — M25.511 CHRONIC RIGHT SHOULDER PAIN: ICD-10-CM

## 2022-02-16 PROCEDURE — 3E0U33Z INTRODUCTION OF ANTI-INFLAMMATORY INTO JOINTS, PERCUTANEOUS APPROACH: ICD-10-PCS | Performed by: ANESTHESIOLOGY

## 2022-02-16 PROCEDURE — 77002 NEEDLE LOCALIZATION BY XRAY: CPT | Performed by: ANESTHESIOLOGY

## 2022-02-16 PROCEDURE — BP181ZZ FLUOROSCOPY OF RIGHT SHOULDER USING LOW OSMOLAR CONTRAST: ICD-10-PCS | Performed by: ANESTHESIOLOGY

## 2022-02-16 PROCEDURE — 3E0U3BZ INTRODUCTION OF ANESTHETIC AGENT INTO JOINTS, PERCUTANEOUS APPROACH: ICD-10-PCS | Performed by: ANESTHESIOLOGY

## 2022-02-16 RX ORDER — LIDOCAINE HYDROCHLORIDE 10 MG/ML
INJECTION, SOLUTION EPIDURAL; INFILTRATION; INTRACAUDAL; PERINEURAL
Status: DISCONTINUED | OUTPATIENT
Start: 2022-02-16 | End: 2022-02-16

## 2022-02-16 RX ORDER — DIPHENHYDRAMINE HYDROCHLORIDE 50 MG/ML
50 INJECTION INTRAMUSCULAR; INTRAVENOUS ONCE AS NEEDED
Status: DISCONTINUED | OUTPATIENT
Start: 2022-02-16 | End: 2022-02-16

## 2022-02-16 RX ORDER — ONDANSETRON 2 MG/ML
4 INJECTION INTRAMUSCULAR; INTRAVENOUS ONCE AS NEEDED
Status: DISCONTINUED | OUTPATIENT
Start: 2022-02-16 | End: 2022-02-16

## 2022-02-16 RX ORDER — METHYLPREDNISOLONE ACETATE 40 MG/ML
INJECTION, SUSPENSION INTRA-ARTICULAR; INTRALESIONAL; INTRAMUSCULAR; SOFT TISSUE
Status: DISCONTINUED | OUTPATIENT
Start: 2022-02-16 | End: 2022-02-16

## 2022-02-16 NOTE — OPERATIVE REPORT
[facility]  Operative Report  2022     Genevieve Green Patient Status:  Hospital Outpatient Surgery    1955 MRN [id number]   Location [id number] [place] [address] Attending Kennedi Carlos MD   Hosp Day # 0 PCP Lady Rahat DO     Indication: Anna Lamar is a 61year old female patient with chronic shoulder pain failed conservative treatment with medication and physical therapy was here for right shoulder joint and subdeltoid bursa injection. Preoperative Diagnosis:  Chronic right shoulder pain [M25.511, G89.29]  Right subdeltoid bursitis  Osteoarthritis of right shoulder    Postoperative Diagnosis: Same as above. Procedure performed: Right shoulder joint and right subdeltoid bursa injection under fluoroscopy. Anesthesia: Local.    EBL: Less than 1 ml. Procedure Description:   After reviewing the patient's history and performing a focused physical examination, the diagnosis was confirmed and contraindications such as infection and coagulopathy were ruled out. Following review of potential side effects and complications, including but not necessarily limited to infection, allergic reaction, local tissue breakdown, nerve injury, and paresis, the patient indicated they understood and agreed to proceed. After obtaining the informed consent, the patient was brought to the procedure room and monitored. The vital signs were monitored and recorded by an experienced RN. The patient was brought to the procedure room and placed in supine position. ASA standard monitors were placed. Vitals were noted. NPO status was checked. The right shoulder was prepped and draped. Intravenous sedation was given with propofol. The right shoulder joint and subdeltoid bursa was reached with a 22-gauge 3.5 inch spinal needle under direct fluoroscopic vision.   Then, 10 mL 1% Lidocaine mixed with 40 mg Depo-Medrol was injected after repeated aspiration, 5 mL inside the joint, 5 mL over and in the vicinity of the bursa. The patient tolerated the procedure very well. The patient has complete understanding of the risks and benefits of the procedure. Complications: None. Follow up: The patient will be followed clinic as needed basis.           Arpita Graves MD

## 2022-02-24 ENCOUNTER — TELEPHONE (OUTPATIENT)
Dept: NEUROLOGY | Facility: CLINIC | Age: 67
End: 2022-02-24

## 2022-02-24 RX ORDER — METHYLPREDNISOLONE 4 MG/1
TABLET ORAL
Qty: 1 EACH | Refills: 0 | Status: SHIPPED | OUTPATIENT
Start: 2022-02-24

## 2022-02-24 NOTE — TELEPHONE ENCOUNTER
Discussed patient's migraine message with Dr Aure Valencia. Order a Rx Medrol dose Pack per Dr Aure Valencia- sent to [facility] per verbal order. Patient instructed to take a max of 1 Nurtec daily and she should go to ER if sx severe.

## 2022-02-24 NOTE — TELEPHONE ENCOUNTER
Pt is calling in regards to having a migraine since 6 am this morning. Pt states that she has already taken the 2 doses of Nurtec and is wandering what else can she do? Pt is asking if she can take a 3rd dose?

## 2022-03-03 RX ORDER — RIMEGEPANT SULFATE 75 MG/75MG
TABLET, ORALLY DISINTEGRATING ORAL
Qty: 16 TABLET | Refills: 0 | Status: SHIPPED | OUTPATIENT
Start: 2022-03-03

## 2022-03-09 ENCOUNTER — PATIENT MESSAGE (OUTPATIENT)
Dept: NEUROLOGY | Facility: CLINIC | Age: 67
End: 2022-03-09

## 2022-03-09 NOTE — TELEPHONE ENCOUNTER
From: Una Lacey  Sent: 3/9/2022 10:55 AM CST  To: Zeenat Clayton Nurse  Subject: Naratriptan    I already saw Dr Vikki Stahl and had a procedure on February 16 for the issue so not sure if I still need appointment?

## 2022-03-16 ENCOUNTER — TELEMEDICINE (OUTPATIENT)
Dept: NEUROLOGY | Facility: CLINIC | Age: 67
End: 2022-03-16
Payer: MEDICARE

## 2022-03-16 DIAGNOSIS — G43.701 CHRONIC MIGRAINE WITHOUT AURA WITH STATUS MIGRAINOSUS, NOT INTRACTABLE: Primary | ICD-10-CM

## 2022-03-16 PROCEDURE — 99213 OFFICE O/P EST LOW 20 MIN: CPT | Performed by: OTHER

## 2022-04-05 PROBLEM — F33.41 MDD (MAJOR DEPRESSIVE DISORDER), RECURRENT, IN PARTIAL REMISSION (HCC): Status: ACTIVE | Noted: 2022-04-05

## 2022-04-05 PROBLEM — F33.41 MDD (MAJOR DEPRESSIVE DISORDER), RECURRENT, IN PARTIAL REMISSION: Status: ACTIVE | Noted: 2022-04-05

## 2022-04-05 RX ORDER — RIMEGEPANT SULFATE 75 MG/75MG
TABLET, ORALLY DISINTEGRATING ORAL
Qty: 16 TABLET | Refills: 0 | Status: SHIPPED | OUTPATIENT
Start: 2022-04-05

## 2022-04-09 ENCOUNTER — PATIENT MESSAGE (OUTPATIENT)
Dept: NEUROLOGY | Facility: CLINIC | Age: 67
End: 2022-04-09

## 2022-04-11 NOTE — TELEPHONE ENCOUNTER
From: Deborah Skinner  To: Deana Fernandez MD  Sent: 4/9/2022 11:25 AM CDT  Subject: Migraines    I am not clear on about migraine treatment with the Nurtec. If I take it every other day and get a migraine on the In Between days or even the same day then I do t have enough to abort a migraine.    [employer]  9-  [phone]

## 2022-05-02 ENCOUNTER — TELEPHONE (OUTPATIENT)
Dept: SURGERY | Facility: CLINIC | Age: 67
End: 2022-05-02

## 2022-05-02 RX ORDER — METHYLPREDNISOLONE 4 MG/1
TABLET ORAL
Qty: 1 EACH | Refills: 0 | Status: SHIPPED | OUTPATIENT
Start: 2022-05-02

## 2022-05-02 NOTE — TELEPHONE ENCOUNTER
S:  Pt reports she NOT having a migraine right now, but is waking up daily with headache that remains dull through the day. B: 03/16/2022 - Telemedicine visit Chronic Migraine    A: Pt reports the Nurtec every other day has been very effective, but she is unable to afford it right now due to Medicare Donut hold rule and co-pay is currently $600. Patient has been taking Advil daily - RN discussed [address]. Patient wondering if she can get MDP, or Toradol and Decadron injection. Patient has tried and failed:    Amitriptyline 100 mg (Currently)  Diclofenac  Duloxetine  Frovatriptan  Gabapentin  Emgality  Naratriptan  Botox  Nurtec  Sumariptan  Topamax  Tramadol  Venlafaxine  Verapamil  Zomig    R: Routed to covering provider.

## 2022-05-02 NOTE — TELEPHONE ENCOUNTER
Patient would like discuss injections for migraines, patients states she thinks it is torodol. Please contact to further discuss.

## 2022-05-02 NOTE — TELEPHONE ENCOUNTER
Discussed patient with Dr. Fili Torres and she can have either the Toradol/Decrdon injection or the medrol dose yudith. Patient needs to stop advil daily - discuss with patient amount of advil taken today prior to authorizing Toradol today. Pt should reach out to [employer] to discuss patient assistance plan for Jonathanjolynn Molina since it has worked well for her.

## 2022-05-02 NOTE — TELEPHONE ENCOUNTER
Discussed options with patient and she will try the medrol dose yudith. RN also advised she reach out to PCP as she notes \"my ears are popping\". RN gave number to [employer] and patient reports she does not want to give any bank statements to be evaluated for assistance.

## 2022-06-01 DIAGNOSIS — M62.89 MUSCLE TIGHTNESS: ICD-10-CM

## 2022-06-02 RX ORDER — TIZANIDINE 2 MG/1
TABLET ORAL
Qty: 60 TABLET | Refills: 2 | Status: SHIPPED | OUTPATIENT
Start: 2022-06-02

## 2022-06-09 ENCOUNTER — TELEPHONE (OUTPATIENT)
Dept: FAMILY MEDICINE CLINIC | Facility: CLINIC | Age: 67
End: 2022-06-09

## 2022-06-09 ENCOUNTER — OFFICE VISIT (OUTPATIENT)
Dept: FAMILY MEDICINE CLINIC | Facility: CLINIC | Age: 67
End: 2022-06-09
Payer: MEDICARE

## 2022-06-09 ENCOUNTER — HOSPITAL ENCOUNTER (OUTPATIENT)
Dept: GENERAL RADIOLOGY | Age: 67
Discharge: HOME | End: 2022-06-09
Attending: FAMILY MEDICINE
Payer: MEDICARE

## 2022-06-09 VITALS
BODY MASS INDEX: 29.61 KG/M2 | SYSTOLIC BLOOD PRESSURE: 114 MMHG | HEART RATE: 98 BPM | RESPIRATION RATE: 16 BRPM | TEMPERATURE: 97 F | DIASTOLIC BLOOD PRESSURE: 60 MMHG | HEIGHT: 66 IN | WEIGHT: 184.25 LBS | OXYGEN SATURATION: 98 %

## 2022-06-09 DIAGNOSIS — N39.3 URINARY, INCONTINENCE, STRESS FEMALE: ICD-10-CM

## 2022-06-09 DIAGNOSIS — S61.412A LACERATION OF LEFT HAND, FOREIGN BODY PRESENCE UNSPECIFIED, INITIAL ENCOUNTER: Primary | ICD-10-CM

## 2022-06-09 DIAGNOSIS — L03.114 CELLULITIS OF LEFT UPPER EXTREMITY: ICD-10-CM

## 2022-06-09 DIAGNOSIS — S61.412A LACERATION OF LEFT HAND, FOREIGN BODY PRESENCE UNSPECIFIED, INITIAL ENCOUNTER: ICD-10-CM

## 2022-06-09 DIAGNOSIS — M79.642 LEFT HAND PAIN: ICD-10-CM

## 2022-06-09 LAB
APPEARANCE: CLEAR
BILIRUB UR QL STRIP.AUTO: NEGATIVE
BILIRUBIN: NEGATIVE
CLARITY UR REFRACT.AUTO: CLEAR
COLOR UR AUTO: YELLOW
GLUCOSE (URINE DIPSTICK): NEGATIVE MG/DL
GLUCOSE UR STRIP.AUTO-MCNC: NEGATIVE MG/DL
HYALINE CASTS #/AREA URNS AUTO: PRESENT /LPF
KETONES (URINE DIPSTICK): NEGATIVE MG/DL
KETONES UR STRIP.AUTO-MCNC: NEGATIVE MG/DL
LEUKOCYTES: NEGATIVE
MULTISTIX LOT#: ABNORMAL NUMERIC
NITRITE UR QL STRIP.AUTO: NEGATIVE
NITRITE, URINE: NEGATIVE
PH UR STRIP.AUTO: 5 [PH] (ref 5–8)
PH, URINE: 5 (ref 4.5–8)
PROT UR STRIP.AUTO-MCNC: NEGATIVE MG/DL
PROTEIN (URINE DIPSTICK): NEGATIVE MG/DL
SP GR UR STRIP.AUTO: 1.01 (ref 1–1.03)
SPECIFIC GRAVITY: 1.01 (ref 1–1.03)
URINE-COLOR: YELLOW
UROBILINOGEN UR STRIP.AUTO-MCNC: <2 MG/DL
UROBILINOGEN,SEMI-QN: 0.2 MG/DL (ref 0–1.9)

## 2022-06-09 PROCEDURE — 3074F SYST BP LT 130 MM HG: CPT | Performed by: FAMILY MEDICINE

## 2022-06-09 PROCEDURE — 3078F DIAST BP <80 MM HG: CPT | Performed by: FAMILY MEDICINE

## 2022-06-09 PROCEDURE — 87086 URINE CULTURE/COLONY COUNT: CPT | Performed by: FAMILY MEDICINE

## 2022-06-09 PROCEDURE — 73130 X-RAY EXAM OF HAND: CPT | Performed by: FAMILY MEDICINE

## 2022-06-09 PROCEDURE — 3008F BODY MASS INDEX DOCD: CPT | Performed by: FAMILY MEDICINE

## 2022-06-09 PROCEDURE — 81003 URINALYSIS AUTO W/O SCOPE: CPT | Performed by: FAMILY MEDICINE

## 2022-06-09 PROCEDURE — 99214 OFFICE O/P EST MOD 30 MIN: CPT | Performed by: FAMILY MEDICINE

## 2022-06-09 PROCEDURE — 81001 URINALYSIS AUTO W/SCOPE: CPT | Performed by: FAMILY MEDICINE

## 2022-06-09 RX ORDER — CEPHALEXIN 500 MG/1
500 CAPSULE ORAL 4 TIMES DAILY
Qty: 12 CAPSULE | Refills: 0 | Status: SHIPPED | OUTPATIENT
Start: 2022-06-09 | End: 2022-06-12

## 2022-06-09 NOTE — TELEPHONE ENCOUNTER
Lm for patient asking to return a call so I could let her know of xray results . appt made with fabrizio aparicio . Kelly Aiken   Future Appointments   Date Time Provider Siddhartha Love   6/14/2022 11:20 AM KARINA [place] [facility]   6/29/2022 12:40 PM Ihsan Davies DO [facility]   7/5/2022  1:00 PM Frankey Severe, MD [facility]

## 2022-06-09 NOTE — TELEPHONE ENCOUNTER
----- Message from [facility], DO sent at 6/9/2022  3:16 PM CDT -----  Pls inform pt that XR of L hand does demonstrate a superficial foreign body in the palmar soft tissue. No fractures or dislocations. Will refer to ortho.  Inform pt about appt

## 2022-06-10 ENCOUNTER — TELEPHONE (OUTPATIENT)
Dept: FAMILY MEDICINE CLINIC | Facility: CLINIC | Age: 67
End: 2022-06-10

## 2022-06-10 DIAGNOSIS — L03.114 CELLULITIS OF LEFT UPPER EXTREMITY: ICD-10-CM

## 2022-06-10 DIAGNOSIS — S61.412A LACERATION OF LEFT HAND, FOREIGN BODY PRESENCE UNSPECIFIED, INITIAL ENCOUNTER: Primary | ICD-10-CM

## 2022-06-10 NOTE — TELEPHONE ENCOUNTER
Pt called stating as per her ins she needs a Prior Author for the 6-14-22 she has scheduled with ortho. (She said not a referral).

## 2022-06-11 ENCOUNTER — PATIENT MESSAGE (OUTPATIENT)
Dept: FAMILY MEDICINE CLINIC | Facility: CLINIC | Age: 67
End: 2022-06-11

## 2022-06-13 RX ORDER — FLUCONAZOLE 150 MG/1
150 TABLET ORAL ONCE
Qty: 1 TABLET | Refills: 0 | Status: SHIPPED | OUTPATIENT
Start: 2022-06-13 | End: 2022-06-13

## 2022-06-14 ENCOUNTER — OFFICE VISIT (OUTPATIENT)
Dept: ORTHOPEDICS CLINIC | Facility: CLINIC | Age: 67
End: 2022-06-14
Payer: MEDICARE

## 2022-06-14 ENCOUNTER — TELEPHONE (OUTPATIENT)
Dept: ORTHOPEDICS CLINIC | Facility: CLINIC | Age: 67
End: 2022-06-14

## 2022-06-14 VITALS — BODY MASS INDEX: 29.06 KG/M2 | HEIGHT: 66.5 IN | WEIGHT: 183 LBS

## 2022-06-14 DIAGNOSIS — M79.642 LEFT HAND PAIN: Primary | ICD-10-CM

## 2022-06-14 RX ORDER — FLUCONAZOLE 150 MG/1
TABLET ORAL
COMMUNITY
Start: 2022-06-13

## 2022-06-14 NOTE — TELEPHONE ENCOUNTER
Patient wants to know if her procedure has been authorized through the insurance. Please advise. Patient is scheduled to see Stanislaw Kebede today.     Future Appointments   Date Time Provider Siddhartha Aleman   6/14/2022 11:20 AM KARINA Armijo [facility]

## 2022-06-15 DIAGNOSIS — G43.701 CHRONIC MIGRAINE WITHOUT AURA WITH STATUS MIGRAINOSUS, NOT INTRACTABLE: Primary | ICD-10-CM

## 2022-06-16 RX ORDER — NARATRIPTAN 2.5 MG/1
TABLET ORAL
Qty: 8 TABLET | Refills: 0 | Status: SHIPPED | OUTPATIENT
Start: 2022-06-16

## 2022-06-29 ENCOUNTER — OFFICE VISIT (OUTPATIENT)
Dept: FAMILY MEDICINE CLINIC | Facility: CLINIC | Age: 67
End: 2022-06-29
Payer: MEDICARE

## 2022-06-29 VITALS
HEIGHT: 65.75 IN | RESPIRATION RATE: 16 BRPM | BODY MASS INDEX: 29.99 KG/M2 | SYSTOLIC BLOOD PRESSURE: 122 MMHG | DIASTOLIC BLOOD PRESSURE: 82 MMHG | WEIGHT: 184.38 LBS | TEMPERATURE: 97 F | OXYGEN SATURATION: 99 % | HEART RATE: 87 BPM

## 2022-06-29 DIAGNOSIS — Z78.0 POSTMENOPAUSAL: ICD-10-CM

## 2022-06-29 DIAGNOSIS — I10 ESSENTIAL HYPERTENSION, BENIGN: ICD-10-CM

## 2022-06-29 DIAGNOSIS — E55.9 VITAMIN D DEFICIENCY: ICD-10-CM

## 2022-06-29 DIAGNOSIS — K21.9 GASTROESOPHAGEAL REFLUX DISEASE WITHOUT ESOPHAGITIS: ICD-10-CM

## 2022-06-29 DIAGNOSIS — Z11.59 NEED FOR HEPATITIS C SCREENING TEST: ICD-10-CM

## 2022-06-29 DIAGNOSIS — E78.2 MIXED HYPERLIPIDEMIA: ICD-10-CM

## 2022-06-29 DIAGNOSIS — Z12.31 VISIT FOR SCREENING MAMMOGRAM: ICD-10-CM

## 2022-06-29 DIAGNOSIS — F41.9 ANXIETY DISORDER, UNSPECIFIED TYPE: ICD-10-CM

## 2022-06-29 DIAGNOSIS — Z00.00 ENCOUNTER FOR ANNUAL HEALTH EXAMINATION: Primary | ICD-10-CM

## 2022-06-29 DIAGNOSIS — F33.42 MDD (MAJOR DEPRESSIVE DISORDER), RECURRENT, IN FULL REMISSION (HCC): ICD-10-CM

## 2022-06-29 DIAGNOSIS — M79.7 FIBROMYALGIA: ICD-10-CM

## 2022-06-29 DIAGNOSIS — I70.0 ABDOMINAL AORTIC ATHEROSCLEROSIS (HCC): ICD-10-CM

## 2022-06-29 DIAGNOSIS — Z79.890 POSTMENOPAUSAL HRT (HORMONE REPLACEMENT THERAPY): ICD-10-CM

## 2022-06-29 DIAGNOSIS — G43.009 MIGRAINE WITHOUT AURA AND WITHOUT STATUS MIGRAINOSUS, NOT INTRACTABLE: ICD-10-CM

## 2022-07-01 PROBLEM — E27.49 SECONDARY ADRENAL INSUFFICIENCY (HCC): Status: RESOLVED | Noted: 2018-04-09 | Resolved: 2022-07-01

## 2022-07-15 RX ORDER — AMITRIPTYLINE HYDROCHLORIDE 100 MG/1
100 TABLET, FILM COATED ORAL NIGHTLY
Qty: 30 TABLET | Refills: 1 | Status: SHIPPED | OUTPATIENT
Start: 2022-07-15

## 2022-07-15 NOTE — TELEPHONE ENCOUNTER
Patient states she is out of medication, originally requested refill on Wednesday from Dr. Brett Macias but was notified she is out of town and was advised to call our office. Patient requesting this be sent as soon as possible. Patient calling to request refill of amitriptyline 100 MG Oral Tab  [facility] [address] [facility], [address] [place] [address], [phone], [phone]    Patient informed of 48 hour refill policy excluding weekends and holidays. Informed patient prescription is sent directly to pharmacy. Further explained patient will not receive a call back once prescription is ready.

## 2022-07-29 DIAGNOSIS — G43.701 CHRONIC MIGRAINE WITHOUT AURA WITH STATUS MIGRAINOSUS, NOT INTRACTABLE: ICD-10-CM

## 2022-07-29 RX ORDER — NARATRIPTAN 2.5 MG/1
TABLET ORAL
Qty: 8 TABLET | Refills: 2 | Status: SHIPPED | OUTPATIENT
Start: 2022-07-29

## 2022-08-12 ENCOUNTER — PATIENT MESSAGE (OUTPATIENT)
Dept: NEUROLOGY | Facility: CLINIC | Age: 67
End: 2022-08-12

## 2022-08-12 ENCOUNTER — TELEPHONE (OUTPATIENT)
Dept: NEUROLOGY | Facility: CLINIC | Age: 67
End: 2022-08-12

## 2022-08-12 DIAGNOSIS — G43.701 CHRONIC MIGRAINE WITHOUT AURA WITH STATUS MIGRAINOSUS, NOT INTRACTABLE: ICD-10-CM

## 2022-08-12 RX ORDER — METHYLPREDNISOLONE 4 MG/1
TABLET ORAL
Qty: 21 EACH | Refills: 0 | Status: SHIPPED | OUTPATIENT
Start: 2022-08-12

## 2022-08-12 RX ORDER — METHYLPREDNISOLONE 4 MG/1
TABLET ORAL
Qty: 21 EACH | Refills: 0 | OUTPATIENT
Start: 2022-08-12

## 2022-08-12 NOTE — TELEPHONE ENCOUNTER
Acute Migraine assessment:    Is this your typical migraine? Describe any change in character from past migraines. Yes: comes back as soon as it goes away     Location of Pain (select all that apply):  temple, top of head  # Days pain has been present:  3    Describe the pain:  Throbbing  Intensity of the headaches:    With medication: MODERATE   Without medication SEVERE  Associated Symptoms (check all that apply):  Sensitivity to light   Non-pharmaceutical interventions tried and outcome:   [x] Lying down / sleeping:  NO CHANGE  [x] Being in a dark quiet room:  NO CHANGE  [x] Massage your head: NO CHANGE  [x] Cold pack on your head/neck:  NO CHANGE  [x] Hot pack on your head/neck:  NO CHANGE  [] Other:     Abortive Medications tried:  Naratriptan  Current preventative medication list:  Amitriptyline  Any missed doses? No  Any other relevant information:  Patient states she feels like she is in a loop  Any medications contraindicated? Tried and failed?  no    Willing to try Medrol Dosepak? Yes  Last taken:  05/02/2022  Willing to try Toradol/Decadron injections? Yes  Last taken:  10/27/2021 IP  Advised patient to seek immediate medical treatment in ED for any concerning symptoms or changes to condition.

## 2022-08-12 NOTE — TELEPHONE ENCOUNTER
Nurtec approved 5/14/2022-8/12/2023. Approval letter faxed to [place] with fax confirmation received.

## 2022-09-12 ENCOUNTER — TELEPHONE (OUTPATIENT)
Dept: NEUROLOGY | Facility: CLINIC | Age: 67
End: 2022-09-12

## 2022-09-12 NOTE — TELEPHONE ENCOUNTER
pt states she had a very painful and concerning headache yesterday; pt still experience some pain but believes she should have this checked out; pls call

## 2022-09-12 NOTE — TELEPHONE ENCOUNTER
Acute Migraine assessment:    Is this your typical migraine? Describe any change in character from past migraines. No: Pt states this occurred while she was working on the computer, felt like her Berneta Card was about to explos  de\". Pt states she has never had a headache like that, did not go to ED but unsure if she should have. Advised ED might be best option, considering lingering pain behind eyes. Pt requesting provider input. Location of Pain (select all that apply):  behind eyes  # Days pain has been present:  1    Describe the pain:  Pressure  Intensity of the headaches:    With medication: SEVERE   Without medication SEVERE  Associated Symptoms (check all that apply):  Sensitivity to light and Sensitivity to sound   Non-pharmaceutical interventions tried and outcome:   [] Lying down / sleeping:  NO CHANGE  [x] Being in a dark quiet room:  NO CHANGE  [x] Massage your head: NO CHANGE  [x] Cold pack on your head/neck:  NO CHANGE  [x] Hot pack on your head/neck:  NO CHANGE  [] Other:     Abortive Medications tried:  Naratriptan  Current preventative medication list:  Amitriptyline   Any missed doses? No  Willing to try Medrol Dosepak? No  Last taken:    Willing to try Toradol/Decadron injections? No  Last taken:   Advised patient to seek immediate medical treatment in ED for any concerning symptoms or changes to condition.

## 2022-09-13 ENCOUNTER — PATIENT MESSAGE (OUTPATIENT)
Dept: NEUROLOGY | Facility: CLINIC | Age: 67
End: 2022-09-13

## 2022-09-13 NOTE — TELEPHONE ENCOUNTER
From: Genevieve Green  To: Lois Cottrell MD  Sent: 9/13/2022 11:00 AM CDT  Subject: Headache migraine     Hello this is Star Mcaleese I called a couple of times and the Dr did not get back to me. I had an extremely bad migraine behind my eyes last weekend and now am feeling very fatigued and a feeling of water in my head on the left side.  Please call asap   [phone]

## 2022-09-13 NOTE — TELEPHONE ENCOUNTER
Pt calling back - wants to be seen today for possible injection for her \"bad headache - the pain is like nothing I have had before\". Pt doesn't feel that going to the ED will help. Please call to advise.

## 2022-09-14 NOTE — TELEPHONE ENCOUNTER
Discussed patient's migraine messages with Dr Jose Manuel Vital who states patient can have Medrol dose Pack or come in for Toradol Decadron injection. Patient notified and states she is still having pressure and some pain over her left temple area. States she recently had Medrol Dose Pack but would like to come in for injection tomorrow. Meek't scheduled for 9/16  11:15 but patient will cancel meek't if improved tomorrow.

## 2022-09-15 ENCOUNTER — NURSE ONLY (OUTPATIENT)
Dept: NEUROLOGY | Facility: CLINIC | Age: 67
End: 2022-09-15
Payer: MEDICARE

## 2022-09-15 ENCOUNTER — TELEPHONE (OUTPATIENT)
Dept: NEUROLOGY | Facility: CLINIC | Age: 67
End: 2022-09-15

## 2022-09-15 DIAGNOSIS — G43.701 CHRONIC MIGRAINE WITHOUT AURA WITH STATUS MIGRAINOSUS, NOT INTRACTABLE: Primary | ICD-10-CM

## 2022-09-15 RX ORDER — KETOROLAC TROMETHAMINE 30 MG/ML
30 INJECTION, SOLUTION INTRAMUSCULAR; INTRAVENOUS ONCE
Status: COMPLETED | OUTPATIENT
Start: 2022-09-15 | End: 2022-09-15

## 2022-09-15 RX ORDER — DEXAMETHASONE SODIUM PHOSPHATE 10 MG/ML
10 INJECTION, SOLUTION INTRAMUSCULAR; INTRAVENOUS ONCE
Status: COMPLETED | OUTPATIENT
Start: 2022-09-15 | End: 2022-09-15

## 2022-09-15 RX ADMIN — DEXAMETHASONE SODIUM PHOSPHATE 10 MG: 10 INJECTION, SOLUTION INTRAMUSCULAR; INTRAVENOUS at 11:33:00

## 2022-09-15 RX ADMIN — KETOROLAC TROMETHAMINE 30 MG: 30 INJECTION, SOLUTION INTRAMUSCULAR; INTRAVENOUS at 11:31:00

## 2022-09-15 NOTE — TELEPHONE ENCOUNTER
Patient was in office for a toradol decadron injection. Patient requested nurtec samples. Adivsed patient will need an order from provider. She states can you call her on the phone. Patient advised will request order from provider for samples. Patient states she will have   when ready.

## 2022-09-16 RX ORDER — RIMEGEPANT SULFATE 75 MG/75MG
75 TABLET, ORALLY DISINTEGRATING ORAL AS NEEDED
Qty: 6 TABLET | Refills: 0 | COMMUNITY
Start: 2022-09-16

## 2022-09-16 NOTE — TELEPHONE ENCOUNTER
MD Salima Curry [facility], RN  Caller: Unspecified (Yesterday, 11:35 AM)  Yes. thanks     My chart message sent to patient to advise she can .

## 2022-10-03 ENCOUNTER — TELEPHONE (OUTPATIENT)
Dept: FAMILY MEDICINE CLINIC | Facility: CLINIC | Age: 67
End: 2022-10-03

## 2022-10-03 ENCOUNTER — LAB ENCOUNTER (OUTPATIENT)
Dept: LAB | Age: 67
End: 2022-10-03
Attending: FAMILY MEDICINE
Payer: MEDICARE

## 2022-10-03 DIAGNOSIS — I10 ESSENTIAL HYPERTENSION, BENIGN: ICD-10-CM

## 2022-10-03 DIAGNOSIS — Z11.59 NEED FOR HEPATITIS C SCREENING TEST: ICD-10-CM

## 2022-10-03 DIAGNOSIS — Z00.00 ENCOUNTER FOR ANNUAL HEALTH EXAMINATION: ICD-10-CM

## 2022-10-03 DIAGNOSIS — E55.9 VITAMIN D DEFICIENCY: ICD-10-CM

## 2022-10-03 LAB
ALBUMIN SERPL-MCNC: 3.5 G/DL (ref 3.4–5)
ALBUMIN/GLOB SERPL: 1 {RATIO} (ref 1–2)
ALP LIVER SERPL-CCNC: 27 U/L
ALT SERPL-CCNC: 21 U/L
ANION GAP SERPL CALC-SCNC: 3 MMOL/L (ref 0–18)
AST SERPL-CCNC: 12 U/L (ref 15–37)
BASOPHILS # BLD AUTO: 0.05 X10(3) UL (ref 0–0.2)
BASOPHILS NFR BLD AUTO: 0.7 %
BILIRUB SERPL-MCNC: 0.2 MG/DL (ref 0.1–2)
BUN BLD-MCNC: 12 MG/DL (ref 7–18)
CALCIUM BLD-MCNC: 8.3 MG/DL (ref 8.5–10.1)
CHLORIDE SERPL-SCNC: 109 MMOL/L (ref 98–112)
CHOLEST SERPL-MCNC: 248 MG/DL (ref ?–200)
CO2 SERPL-SCNC: 25 MMOL/L (ref 21–32)
CREAT BLD-MCNC: 0.89 MG/DL
EOSINOPHIL # BLD AUTO: 0.14 X10(3) UL (ref 0–0.7)
EOSINOPHIL NFR BLD AUTO: 1.9 %
ERYTHROCYTE [DISTWIDTH] IN BLOOD BY AUTOMATED COUNT: 13.3 %
FASTING PATIENT LIPID ANSWER: YES
FASTING STATUS PATIENT QL REPORTED: YES
GFR SERPLBLD BASED ON 1.73 SQ M-ARVRAT: 72 ML/MIN/1.73M2 (ref 60–?)
GLOBULIN PLAS-MCNC: 3.4 G/DL (ref 2.8–4.4)
GLUCOSE BLD-MCNC: 85 MG/DL (ref 70–99)
HCT VFR BLD AUTO: 39.1 %
HCV AB SERPL QL IA: NONREACTIVE
HDLC SERPL-MCNC: 56 MG/DL (ref 40–59)
HGB BLD-MCNC: 12.6 G/DL
IMM GRANULOCYTES # BLD AUTO: 0.02 X10(3) UL (ref 0–1)
IMM GRANULOCYTES NFR BLD: 0.3 %
LDLC SERPL CALC-MCNC: 145 MG/DL (ref ?–100)
LYMPHOCYTES # BLD AUTO: 2.81 X10(3) UL (ref 1–4)
LYMPHOCYTES NFR BLD AUTO: 37.4 %
MCH RBC QN AUTO: 29.4 PG (ref 26–34)
MCHC RBC AUTO-ENTMCNC: 32.2 G/DL (ref 31–37)
MCV RBC AUTO: 91.1 FL
MONOCYTES # BLD AUTO: 0.58 X10(3) UL (ref 0.1–1)
MONOCYTES NFR BLD AUTO: 7.7 %
NEUTROPHILS # BLD AUTO: 3.92 X10 (3) UL (ref 1.5–7.7)
NEUTROPHILS # BLD AUTO: 3.92 X10(3) UL (ref 1.5–7.7)
NEUTROPHILS NFR BLD AUTO: 52 %
NONHDLC SERPL-MCNC: 192 MG/DL (ref ?–130)
OSMOLALITY SERPL CALC.SUM OF ELEC: 283 MOSM/KG (ref 275–295)
PLATELET # BLD AUTO: 257 10(3)UL (ref 150–450)
POTASSIUM SERPL-SCNC: 3.9 MMOL/L (ref 3.5–5.1)
PROT SERPL-MCNC: 6.9 G/DL (ref 6.4–8.2)
RBC # BLD AUTO: 4.29 X10(6)UL
SODIUM SERPL-SCNC: 137 MMOL/L (ref 136–145)
TRIGL SERPL-MCNC: 259 MG/DL (ref 30–149)
TSI SER-ACNC: 1.07 MIU/ML (ref 0.36–3.74)
VIT D+METAB SERPL-MCNC: 22 NG/ML (ref 30–100)
VLDLC SERPL CALC-MCNC: 49 MG/DL (ref 0–30)
WBC # BLD AUTO: 7.5 X10(3) UL (ref 4–11)

## 2022-10-03 PROCEDURE — 80053 COMPREHEN METABOLIC PANEL: CPT

## 2022-10-03 PROCEDURE — 36415 COLL VENOUS BLD VENIPUNCTURE: CPT

## 2022-10-03 PROCEDURE — 82306 VITAMIN D 25 HYDROXY: CPT

## 2022-10-03 PROCEDURE — 84443 ASSAY THYROID STIM HORMONE: CPT

## 2022-10-03 PROCEDURE — 80061 LIPID PANEL: CPT

## 2022-10-03 PROCEDURE — 86803 HEPATITIS C AB TEST: CPT

## 2022-10-03 PROCEDURE — 85025 COMPLETE CBC W/AUTO DIFF WBC: CPT

## 2022-10-03 NOTE — TELEPHONE ENCOUNTER
Patient's right knee has been hurting since the weekend. Is wearing her 's knee brace. Patient is taking tylenol. Pt's pain level is 8. Dr Carol Stuart has no availabilty. Please triage and advise.

## 2022-10-03 NOTE — TELEPHONE ENCOUNTER
[facility] requesting return call to nurse with symptom detail and to offer appt tomorrow with Dr. Abhijit Vallejo. Office number given to return call. Advised if pain is severe with weakness, N/T can go to UC.

## 2022-10-04 ENCOUNTER — PATIENT MESSAGE (OUTPATIENT)
Dept: FAMILY MEDICINE CLINIC | Facility: CLINIC | Age: 67
End: 2022-10-04

## 2022-10-04 ENCOUNTER — OFFICE VISIT (OUTPATIENT)
Dept: FAMILY MEDICINE CLINIC | Facility: CLINIC | Age: 67
End: 2022-10-04
Payer: MEDICARE

## 2022-10-04 VITALS
HEIGHT: 65.75 IN | DIASTOLIC BLOOD PRESSURE: 72 MMHG | TEMPERATURE: 97 F | RESPIRATION RATE: 16 BRPM | HEART RATE: 92 BPM | OXYGEN SATURATION: 98 % | SYSTOLIC BLOOD PRESSURE: 112 MMHG | WEIGHT: 189 LBS | BODY MASS INDEX: 30.74 KG/M2

## 2022-10-04 DIAGNOSIS — M25.561 ACUTE PAIN OF RIGHT KNEE: Primary | ICD-10-CM

## 2022-10-04 PROBLEM — F33.1 MDD (MAJOR DEPRESSIVE DISORDER), RECURRENT EPISODE, MODERATE (HCC): Status: RESOLVED | Noted: 2021-04-28 | Resolved: 2022-10-04

## 2022-10-04 PROCEDURE — 99213 OFFICE O/P EST LOW 20 MIN: CPT | Performed by: FAMILY MEDICINE

## 2022-10-04 PROCEDURE — 3074F SYST BP LT 130 MM HG: CPT | Performed by: FAMILY MEDICINE

## 2022-10-04 PROCEDURE — 3008F BODY MASS INDEX DOCD: CPT | Performed by: FAMILY MEDICINE

## 2022-10-04 PROCEDURE — 3078F DIAST BP <80 MM HG: CPT | Performed by: FAMILY MEDICINE

## 2022-10-06 ENCOUNTER — PATIENT MESSAGE (OUTPATIENT)
Dept: FAMILY MEDICINE CLINIC | Facility: CLINIC | Age: 67
End: 2022-10-06

## 2022-10-08 DIAGNOSIS — G43.701 CHRONIC MIGRAINE WITHOUT AURA WITH STATUS MIGRAINOSUS, NOT INTRACTABLE: ICD-10-CM

## 2022-10-09 DIAGNOSIS — G43.701 CHRONIC MIGRAINE WITHOUT AURA WITH STATUS MIGRAINOSUS, NOT INTRACTABLE: ICD-10-CM

## 2022-10-10 DIAGNOSIS — G43.701 CHRONIC MIGRAINE WITHOUT AURA WITH STATUS MIGRAINOSUS, NOT INTRACTABLE: ICD-10-CM

## 2022-10-10 RX ORDER — NARATRIPTAN 2.5 MG/1
TABLET ORAL
Qty: 10 TABLET | Refills: 0 | OUTPATIENT
Start: 2022-10-10

## 2022-10-10 RX ORDER — NARATRIPTAN 2.5 MG/1
TABLET ORAL
Qty: 8 TABLET | Refills: 2 | Status: SHIPPED | OUTPATIENT
Start: 2022-10-10

## 2022-10-11 RX ORDER — NARATRIPTAN 2.5 MG/1
TABLET ORAL
Qty: 10 TABLET | Refills: 0 | OUTPATIENT
Start: 2022-10-11

## 2022-10-12 ENCOUNTER — PATIENT MESSAGE (OUTPATIENT)
Dept: FAMILY MEDICINE CLINIC | Facility: CLINIC | Age: 67
End: 2022-10-12

## 2022-10-26 ENCOUNTER — TELEMEDICINE (OUTPATIENT)
Dept: FAMILY MEDICINE CLINIC | Facility: CLINIC | Age: 67
End: 2022-10-26
Payer: MEDICARE

## 2022-10-26 DIAGNOSIS — R06.09 DYSPNEA ON EXERTION: ICD-10-CM

## 2022-10-26 DIAGNOSIS — E55.9 VITAMIN D DEFICIENCY: ICD-10-CM

## 2022-10-26 DIAGNOSIS — E78.2 MIXED HYPERLIPIDEMIA: Primary | ICD-10-CM

## 2022-10-26 PROCEDURE — 99214 OFFICE O/P EST MOD 30 MIN: CPT | Performed by: FAMILY MEDICINE

## 2022-10-26 RX ORDER — AMITRIPTYLINE HYDROCHLORIDE 50 MG/1
TABLET, FILM COATED ORAL
COMMUNITY
Start: 2022-10-10 | End: 2022-10-26

## 2022-10-26 RX ORDER — PROGESTERONE 100 MG/1
CAPSULE ORAL
COMMUNITY
Start: 2022-10-10

## 2022-10-26 RX ORDER — ESTRADIOL 1 MG/1
TABLET ORAL
COMMUNITY
Start: 2022-10-10

## 2022-11-08 ENCOUNTER — TELEPHONE (OUTPATIENT)
Dept: OTHER | Facility: HOSPITAL | Age: 67
End: 2022-11-08

## 2022-11-08 NOTE — PROGRESS NOTES
Pt called and left message that she had CTA with calcium scoring in Jan 2022. This test is a repeat of what that test included and should not repeat.  Detailed message left stating for her to call nurse heart line to cancel test.

## 2022-12-11 DIAGNOSIS — M62.89 MUSCLE TIGHTNESS: ICD-10-CM

## 2022-12-12 ENCOUNTER — PATIENT MESSAGE (OUTPATIENT)
Dept: NEUROLOGY | Facility: CLINIC | Age: 67
End: 2022-12-12

## 2022-12-12 NOTE — TELEPHONE ENCOUNTER
From: Keyon Charles  To:  Oc Yuen MD  Sent: 12/12/2022 11:00 AM CST  Subject: Botox    I have an appt this week and wondered if I need authorization from my insurance company before I get the shot   [employer]

## 2022-12-13 RX ORDER — TIZANIDINE 2 MG/1
TABLET ORAL
Qty: 60 TABLET | Refills: 0 | Status: SHIPPED | OUTPATIENT
Start: 2022-12-13

## 2022-12-29 ENCOUNTER — OFFICE VISIT (OUTPATIENT)
Dept: FAMILY MEDICINE CLINIC | Facility: CLINIC | Age: 67
End: 2022-12-29
Payer: MEDICARE

## 2022-12-29 VITALS
WEIGHT: 185 LBS | RESPIRATION RATE: 18 BRPM | TEMPERATURE: 97 F | HEIGHT: 65.75 IN | DIASTOLIC BLOOD PRESSURE: 68 MMHG | SYSTOLIC BLOOD PRESSURE: 110 MMHG | OXYGEN SATURATION: 96 % | HEART RATE: 95 BPM | BODY MASS INDEX: 30.09 KG/M2

## 2022-12-29 DIAGNOSIS — K59.00 CONSTIPATION, UNSPECIFIED CONSTIPATION TYPE: Primary | ICD-10-CM

## 2022-12-29 DIAGNOSIS — I10 ESSENTIAL HYPERTENSION, BENIGN: ICD-10-CM

## 2022-12-29 PROCEDURE — 3074F SYST BP LT 130 MM HG: CPT | Performed by: FAMILY MEDICINE

## 2022-12-29 PROCEDURE — 3078F DIAST BP <80 MM HG: CPT | Performed by: FAMILY MEDICINE

## 2022-12-29 PROCEDURE — 99214 OFFICE O/P EST MOD 30 MIN: CPT | Performed by: FAMILY MEDICINE

## 2022-12-29 PROCEDURE — 3008F BODY MASS INDEX DOCD: CPT | Performed by: FAMILY MEDICINE

## 2022-12-29 RX ORDER — DOCUSATE SODIUM 100 MG/1
100 CAPSULE, LIQUID FILLED ORAL 2 TIMES DAILY
Qty: 30 CAPSULE | Refills: 0 | Status: SHIPPED | OUTPATIENT
Start: 2022-12-29

## 2023-01-03 ENCOUNTER — TELEPHONE (OUTPATIENT)
Dept: FAMILY MEDICINE CLINIC | Facility: CLINIC | Age: 68
End: 2023-01-03

## 2023-01-03 ENCOUNTER — APPOINTMENT (OUTPATIENT)
Dept: CT IMAGING | Age: 68
End: 2023-01-03
Attending: EMERGENCY MEDICINE
Payer: MEDICARE

## 2023-01-03 ENCOUNTER — HOSPITAL ENCOUNTER (EMERGENCY)
Age: 68
Discharge: HOME | End: 2023-01-03
Attending: EMERGENCY MEDICINE
Payer: MEDICARE

## 2023-01-03 VITALS
SYSTOLIC BLOOD PRESSURE: 133 MMHG | TEMPERATURE: 98 F | HEIGHT: 68 IN | OXYGEN SATURATION: 98 % | HEART RATE: 79 BPM | BODY MASS INDEX: 27.28 KG/M2 | DIASTOLIC BLOOD PRESSURE: 87 MMHG | RESPIRATION RATE: 16 BRPM | WEIGHT: 180 LBS

## 2023-01-03 DIAGNOSIS — R10.9 ABDOMINAL PAIN OF UNKNOWN ETIOLOGY: Primary | ICD-10-CM

## 2023-01-03 LAB
ALBUMIN SERPL-MCNC: 3.6 G/DL (ref 3.4–5)
ALBUMIN/GLOB SERPL: 1 {RATIO} (ref 1–2)
ALP LIVER SERPL-CCNC: 28 U/L
ALT SERPL-CCNC: 21 U/L
ANION GAP SERPL CALC-SCNC: 6 MMOL/L (ref 0–18)
AST SERPL-CCNC: 14 U/L (ref 15–37)
BASOPHILS # BLD AUTO: 0.04 X10(3) UL (ref 0–0.2)
BASOPHILS NFR BLD AUTO: 0.5 %
BILIRUB SERPL-MCNC: 0.3 MG/DL (ref 0.1–2)
BILIRUB UR QL STRIP.AUTO: NEGATIVE
BUN BLD-MCNC: 14 MG/DL (ref 7–18)
CALCIUM BLD-MCNC: 9.4 MG/DL (ref 8.5–10.1)
CHLORIDE SERPL-SCNC: 105 MMOL/L (ref 98–112)
CLARITY UR REFRACT.AUTO: CLEAR
CO2 SERPL-SCNC: 27 MMOL/L (ref 21–32)
COLOR UR AUTO: YELLOW
CREAT BLD-MCNC: 0.89 MG/DL
EOSINOPHIL # BLD AUTO: 0.1 X10(3) UL (ref 0–0.7)
EOSINOPHIL NFR BLD AUTO: 1.2 %
ERYTHROCYTE [DISTWIDTH] IN BLOOD BY AUTOMATED COUNT: 13 %
GFR SERPLBLD BASED ON 1.73 SQ M-ARVRAT: 72 ML/MIN/1.73M2 (ref 60–?)
GLOBULIN PLAS-MCNC: 3.6 G/DL (ref 2.8–4.4)
GLUCOSE BLD-MCNC: 90 MG/DL (ref 70–99)
GLUCOSE UR STRIP.AUTO-MCNC: NEGATIVE MG/DL
HCT VFR BLD AUTO: 40.1 %
HGB BLD-MCNC: 13.5 G/DL
IMM GRANULOCYTES # BLD AUTO: 0.03 X10(3) UL (ref 0–1)
IMM GRANULOCYTES NFR BLD: 0.4 %
KETONES UR STRIP.AUTO-MCNC: NEGATIVE MG/DL
LYMPHOCYTES # BLD AUTO: 2.72 X10(3) UL (ref 1–4)
LYMPHOCYTES NFR BLD AUTO: 34 %
MCH RBC QN AUTO: 29.2 PG (ref 26–34)
MCHC RBC AUTO-ENTMCNC: 33.7 G/DL (ref 31–37)
MCV RBC AUTO: 86.8 FL
MONOCYTES # BLD AUTO: 0.55 X10(3) UL (ref 0.1–1)
MONOCYTES NFR BLD AUTO: 6.9 %
NEUTROPHILS # BLD AUTO: 4.57 X10 (3) UL (ref 1.5–7.7)
NEUTROPHILS # BLD AUTO: 4.57 X10(3) UL (ref 1.5–7.7)
NEUTROPHILS NFR BLD AUTO: 57 %
NITRITE UR QL STRIP.AUTO: NEGATIVE
OSMOLALITY SERPL CALC.SUM OF ELEC: 286 MOSM/KG (ref 275–295)
PH UR STRIP.AUTO: 7 [PH] (ref 5–8)
PLATELET # BLD AUTO: 269 10(3)UL (ref 150–450)
POTASSIUM SERPL-SCNC: 4 MMOL/L (ref 3.5–5.1)
PROT SERPL-MCNC: 7.2 G/DL (ref 6.4–8.2)
PROT UR STRIP.AUTO-MCNC: NEGATIVE MG/DL
RBC # BLD AUTO: 4.62 X10(6)UL
RBC UR QL AUTO: NEGATIVE
SODIUM SERPL-SCNC: 138 MMOL/L (ref 136–145)
SP GR UR STRIP.AUTO: 1.01 (ref 1–1.03)
UROBILINOGEN UR STRIP.AUTO-MCNC: 0.2 MG/DL
WBC # BLD AUTO: 8 X10(3) UL (ref 4–11)

## 2023-01-03 PROCEDURE — 87086 URINE CULTURE/COLONY COUNT: CPT | Performed by: EMERGENCY MEDICINE

## 2023-01-03 PROCEDURE — 99284 EMERGENCY DEPT VISIT MOD MDM: CPT

## 2023-01-03 PROCEDURE — 87086 URINE CULTURE/COLONY COUNT: CPT

## 2023-01-03 PROCEDURE — 96360 HYDRATION IV INFUSION INIT: CPT

## 2023-01-03 PROCEDURE — 85025 COMPLETE CBC W/AUTO DIFF WBC: CPT | Performed by: EMERGENCY MEDICINE

## 2023-01-03 PROCEDURE — 96361 HYDRATE IV INFUSION ADD-ON: CPT

## 2023-01-03 PROCEDURE — 81015 MICROSCOPIC EXAM OF URINE: CPT | Performed by: EMERGENCY MEDICINE

## 2023-01-03 PROCEDURE — 80053 COMPREHEN METABOLIC PANEL: CPT | Performed by: EMERGENCY MEDICINE

## 2023-01-03 PROCEDURE — 81001 URINALYSIS AUTO W/SCOPE: CPT

## 2023-01-03 PROCEDURE — 81015 MICROSCOPIC EXAM OF URINE: CPT

## 2023-01-03 PROCEDURE — 74177 CT ABD & PELVIS W/CONTRAST: CPT | Performed by: EMERGENCY MEDICINE

## 2023-01-03 RX ORDER — ACETAMINOPHEN 500 MG
1000 TABLET ORAL ONCE
Status: COMPLETED | OUTPATIENT
Start: 2023-01-03 | End: 2023-01-03

## 2023-01-03 NOTE — DISCHARGE INSTRUCTIONS
Stable hydrated and eat a high-fiber diet. Follow-up with your gastroenterologist this week. Return if any problems.

## 2023-01-03 NOTE — ED INITIAL ASSESSMENT (HPI)
Pt did a cleanse like before a colonoscopy after advise from pcp.  Pt did not have solid stools, liquid brown

## 2023-01-03 NOTE — TELEPHONE ENCOUNTER
Patient called stating she has had no relief from plan for constipation given by Dr. Meg Deal in [address] on 12/29/22. States she feels bloated, sweaty, cramping  And abdominal pain starting under left breast through her whole abdomen to her thighs. Today has stabbing pain that is mainly in the upper left abdomen. Has not had a formed stool for two weeks. Took three scoops of miralax in gatorade for two nights and has been taking docusate Bid for five days. Has passed only small amt of stool with large amts of brown water. Denies fever or blood in stool. Denies N/V. States she feels awful. Does not feel like eating anything. Advised Dr. Meg Deal is not in the office. Due to abdominal pain and lack of stool, advised to go to the ED for further evaluation. May still just be constipated, but we do not want to miss anything more serious such as obstruction. Verbalizes understanding and will go to the ED.

## 2023-01-03 NOTE — ED QUICK NOTES
Pt with hx of constipation, worse in the last 3 months. PT has been using OTC medications with minimal relief. PT used a cleanse per PCP on 12/29, pt states 1.5 days late she had   loose / liquid liquid stools. The next day pt reports her pain got worse. Last BM 12/31  PT with c/o LUQ pain that radiates into RLQ pain, intermittent.

## 2023-01-29 DIAGNOSIS — G43.701 CHRONIC MIGRAINE WITHOUT AURA WITH STATUS MIGRAINOSUS, NOT INTRACTABLE: ICD-10-CM

## 2023-01-30 ENCOUNTER — TELEPHONE (OUTPATIENT)
Dept: FAMILY MEDICINE CLINIC | Facility: CLINIC | Age: 68
End: 2023-01-30

## 2023-01-30 NOTE — TELEPHONE ENCOUNTER
Medication: naratriptan     Date of last refill: 10/10/2022 (#8/2)  [place]     Last office visit: 03/16/2022-Televisit  Due back to clinic per last office note:  not indicated  Date next office visit scheduled:    Future Appointments   Date Time Provider Siddhartha Aleman   3/1/2023  1:00 PM Kisha Pugh  [address] note recommendation:    Plan:  Discussed extensively various options- will re-appealed insurance of [facility] preventive therapy  Another option is to try Ajovy or Aimovig injections  Patient is on Amitriptyline for Depression through her Psychiatrist    Previous canceled appointments 12/14/2022, 10/07/2022

## 2023-01-30 NOTE — TELEPHONE ENCOUNTER
Patient called saying she has been having contispation issues. Is in a lot of pain. Please triage. Saw Dr Florez Sees regarding this matter in Dec. She also has a Gastro Dr and cannot get in until April.  She has used Linzess in the past.

## 2023-01-30 NOTE — TELEPHONE ENCOUNTER
Pt calling stating she needs medication Naratriptan HCl 2.5 MG Oral Tab, pt states she only has 1 pill left.

## 2023-01-31 ENCOUNTER — PATIENT MESSAGE (OUTPATIENT)
Dept: NEUROLOGY | Facility: CLINIC | Age: 68
End: 2023-01-31

## 2023-01-31 DIAGNOSIS — G43.701 CHRONIC MIGRAINE WITHOUT AURA WITH STATUS MIGRAINOSUS, NOT INTRACTABLE: ICD-10-CM

## 2023-01-31 RX ORDER — NARATRIPTAN 2.5 MG/1
TABLET ORAL
Qty: 8 TABLET | Refills: 2 | Status: CANCELLED | OUTPATIENT
Start: 2023-01-31

## 2023-02-01 RX ORDER — NARATRIPTAN 2.5 MG/1
TABLET ORAL
Qty: 8 TABLET | Refills: 0 | Status: SHIPPED | OUTPATIENT
Start: 2023-02-01

## 2023-02-20 ENCOUNTER — OFFICE VISIT (OUTPATIENT)
Dept: PAIN CLINIC | Facility: CLINIC | Age: 68
End: 2023-02-20
Payer: MEDICARE

## 2023-02-20 VITALS — DIASTOLIC BLOOD PRESSURE: 76 MMHG | HEART RATE: 87 BPM | OXYGEN SATURATION: 97 % | SYSTOLIC BLOOD PRESSURE: 122 MMHG

## 2023-02-20 DIAGNOSIS — M50.30 DEGENERATION OF CERVICAL DISC WITHOUT MYELOPATHY: ICD-10-CM

## 2023-02-20 DIAGNOSIS — M54.12 CERVICAL RADICULITIS: Primary | ICD-10-CM

## 2023-02-20 RX ORDER — METHYLPREDNISOLONE 4 MG/1
TABLET ORAL
Qty: 1 EACH | Refills: 0 | Status: SHIPPED | OUTPATIENT
Start: 2023-02-20

## 2023-02-20 RX ORDER — LINACLOTIDE 72 UG/1
CAPSULE, GELATIN COATED ORAL EVERY OTHER DAY
COMMUNITY
Start: 2023-01-31

## 2023-02-20 NOTE — PROGRESS NOTES
Patient presents in office today with reported pain in bilateral neck and shoulders, left upper arm    Current pain level reported = 3-4/10    Last reported dose of n/a      Narcotic Contract renewal n/a    Urine Drug screen n/a        Pt has trouble sleeping/ laying down

## 2023-02-21 ENCOUNTER — TELEPHONE (OUTPATIENT)
Dept: NEUROLOGY | Facility: CLINIC | Age: 68
End: 2023-02-21

## 2023-02-21 NOTE — TELEPHONE ENCOUNTER
Initiated PA with Zeeshan Cespedes for [address] [place] [address] / Uploaded clinical documentation / Case # [id number]

## 2023-02-24 ENCOUNTER — OFFICE VISIT (OUTPATIENT)
Dept: NEUROLOGY | Facility: CLINIC | Age: 68
End: 2023-02-24
Payer: MEDICARE

## 2023-02-24 VITALS
RESPIRATION RATE: 16 BRPM | BODY MASS INDEX: 28 KG/M2 | HEART RATE: 91 BPM | SYSTOLIC BLOOD PRESSURE: 122 MMHG | DIASTOLIC BLOOD PRESSURE: 64 MMHG | WEIGHT: 181.19 LBS

## 2023-02-24 DIAGNOSIS — M54.81 CERVICO-OCCIPITAL NEURALGIA: ICD-10-CM

## 2023-02-24 DIAGNOSIS — G43.701 CHRONIC MIGRAINE WITHOUT AURA WITH STATUS MIGRAINOSUS, NOT INTRACTABLE: Primary | ICD-10-CM

## 2023-02-24 PROCEDURE — 3078F DIAST BP <80 MM HG: CPT | Performed by: OTHER

## 2023-02-24 PROCEDURE — 3074F SYST BP LT 130 MM HG: CPT | Performed by: OTHER

## 2023-02-24 PROCEDURE — 99213 OFFICE O/P EST LOW 20 MIN: CPT | Performed by: OTHER

## 2023-02-24 RX ORDER — AMLODIPINE BESYLATE 5 MG/1
5 TABLET ORAL DAILY
COMMUNITY
Start: 2023-01-29

## 2023-02-24 RX ORDER — FREMANEZUMAB-VFRM 225 MG/1.5ML
1.5 INJECTION SUBCUTANEOUS
Qty: 1.5 ML | Refills: 2 | Status: SHIPPED | OUTPATIENT
Start: 2023-02-24

## 2023-02-24 NOTE — PROGRESS NOTES
Patient last seen 09/24/21. Patient states doing the same.  Was unable to have Nurtec because of insurance

## 2023-02-24 NOTE — TELEPHONE ENCOUNTER
Not denied     If we do not receive additional information, or a request for a Sugq-by-Yxan discussion, to  support an approval by the date specified, the information below will be used in making a  determination. This information contains the reason why the request does not currently  meet medical necessity, and the information needed to support an approval:    Based on Medicare Local Coverage Determination (LCD): Epidural Steroid Injections for  Pain Management (G62366), we cannot approve this request because the following are  needed: A pain or function scale done by the doctor that shows there is severe pain and/or  other symptoms that impact quality of life or daily function. Please provide the requested clinical information Before 2/28/2023. Please call [phone],  select the prompt associated with the request type and enter in reference number [id number] to  speak with a clinician about this request. You may fax additional clinical information to support  this request to [phone] for review.     I do not have anything else to submit and since this is a [employer] it will not give us any other opportunity for P2P if denied

## 2023-02-27 NOTE — TELEPHONE ENCOUNTER
Reference number [id number]    Contacted Ramila to schedule a peer to peer. Unable to schedule. Provider must call directly and select the prompt to speak to a medical director. Blocked schedule on 2/28/23 at 0830.

## 2023-02-28 ENCOUNTER — TELEPHONE (OUTPATIENT)
Dept: PAIN CLINIC | Facility: CLINIC | Age: 68
End: 2023-02-28

## 2023-02-28 NOTE — TELEPHONE ENCOUNTER
Prior authorization request completed for: TRISTEN   Authorization # [id number]  Authorization dates: 2/28/2023 - 4/29/2023  CPT codes approved: 79915  Number of visits/dates of service approved: 1  Physician: Dr. Rl Stone   Location: [facility]     Patient can be scheduled. Routed to Navigator.

## 2023-02-28 NOTE — TELEPHONE ENCOUNTER
Contacted Ramila. Informed that peer to peer must be scheduled. Advised that when call was placed yesterday, all prompts indicated that the provider would be connected directly to a physician to review the case. Able to schedule for later today at 1430. Dr. Onesimo Olivarez will call to complete the peer to peer.

## 2023-03-14 ENCOUNTER — TELEPHONE (OUTPATIENT)
Dept: NEUROLOGY | Facility: CLINIC | Age: 68
End: 2023-03-14

## 2023-03-14 NOTE — TELEPHONE ENCOUNTER
Received potential clinical opportunity: non-formulary product from [facility].   Patient currently fills Ajovy that may results in a higher cost and/or rejected claim    Non-formulary: Ajovy    Formulary: Nurtec 75 mg  Ubrelvy 50 mg, 100 mg  Aimovig 70 mg/ml, 140 mg/ml  Emgality 100 mg/ml and 120 mg/ml    Ajovy last ordered on 2/24/23 for 1 pen and 2 refills

## 2023-03-30 NOTE — TELEPHONE ENCOUNTER
HM updated with pap smear and HPV testing on 1/14/21 with Dr. Chao.    Pt is aware we have auth for this procedure- she would like to wait to see how her cervical facet injection takes before scheduling this procedure.

## 2023-05-02 ENCOUNTER — HOSPITAL ENCOUNTER (OUTPATIENT)
Dept: GENERAL RADIOLOGY | Facility: HOSPITAL | Age: 68
Discharge: HOME | End: 2023-05-02
Attending: NURSE PRACTITIONER
Payer: MEDICARE

## 2023-05-02 ENCOUNTER — TELEPHONE (OUTPATIENT)
Dept: PAIN CLINIC | Facility: CLINIC | Age: 68
End: 2023-05-02

## 2023-05-02 ENCOUNTER — OFFICE VISIT (OUTPATIENT)
Dept: PAIN CLINIC | Facility: CLINIC | Age: 68
End: 2023-05-02
Payer: MEDICARE

## 2023-05-02 VITALS — OXYGEN SATURATION: 97 % | HEART RATE: 91 BPM | SYSTOLIC BLOOD PRESSURE: 110 MMHG | DIASTOLIC BLOOD PRESSURE: 70 MMHG

## 2023-05-02 DIAGNOSIS — M25.551 RIGHT HIP PAIN: ICD-10-CM

## 2023-05-02 DIAGNOSIS — M25.50 CHRONIC JOINT PAIN: Primary | ICD-10-CM

## 2023-05-02 DIAGNOSIS — G89.29 CHRONIC JOINT PAIN: Primary | ICD-10-CM

## 2023-05-02 PROCEDURE — 3074F SYST BP LT 130 MM HG: CPT | Performed by: NURSE PRACTITIONER

## 2023-05-02 PROCEDURE — 3078F DIAST BP <80 MM HG: CPT | Performed by: NURSE PRACTITIONER

## 2023-05-02 PROCEDURE — 99214 OFFICE O/P EST MOD 30 MIN: CPT | Performed by: NURSE PRACTITIONER

## 2023-05-02 PROCEDURE — 73502 X-RAY EXAM HIP UNI 2-3 VIEWS: CPT | Performed by: NURSE PRACTITIONER

## 2023-05-02 NOTE — PROGRESS NOTES
Patient presents in office today with reported pain in RIGHT Hip ,JOINT     Current pain level reported =3 /10    Last reported dose of ADVIL LAST NIGHT

## 2023-05-03 ENCOUNTER — TELEPHONE (OUTPATIENT)
Dept: SURGERY | Facility: CLINIC | Age: 68
End: 2023-05-03

## 2023-05-03 ENCOUNTER — TELEPHONE (OUTPATIENT)
Dept: NEUROLOGY | Facility: CLINIC | Age: 68
End: 2023-05-03

## 2023-05-03 ENCOUNTER — TELEPHONE (OUTPATIENT)
Dept: PAIN CLINIC | Facility: CLINIC | Age: 68
End: 2023-05-03

## 2023-05-03 ENCOUNTER — PATIENT MESSAGE (OUTPATIENT)
Dept: NEUROLOGY | Facility: CLINIC | Age: 68
End: 2023-05-03

## 2023-05-03 DIAGNOSIS — G43.701 CHRONIC MIGRAINE WITHOUT AURA WITH STATUS MIGRAINOSUS, NOT INTRACTABLE: Primary | ICD-10-CM
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
## 2023-05-03 RX ORDER — ERENUMAB-AOOE 70 MG/ML
70 INJECTION SUBCUTANEOUS
Qty: 1 ML | Refills: 2 | Status: SHIPPED | OUTPATIENT
Start: 2023-05-03 | End: 2023-08-01

## 2023-05-03 RX ORDER — METHYLPREDNISOLONE 4 MG/1
TABLET ORAL
Qty: 21 EACH | Refills: 0 | OUTPATIENT
Start: 2023-05-03

## 2023-05-03 RX ORDER — METHYLPREDNISOLONE 4 MG/1
TABLET ORAL
Qty: 1 EACH | Refills: 0 | Status: SHIPPED | OUTPATIENT
Start: 2023-05-03

## 2023-05-03 NOTE — TELEPHONE ENCOUNTER
Acute Migraine assessment:    Is this your typical migraine? Describe any change in character from past migraines. Yes:     Location of Pain (select all that apply):  neck going to left side of temple  # Days pain has been present:  4 on and off    Describe the pain:  Pressure  Intensity of the headaches:    With medication: MODERATE   Without medication SEVERE  Associated Symptoms (check all that apply):  Sensitivity to light   Non-pharmaceutical interventions tried and outcome:   [] Lying down / sleeping:  BETTER  [] Being in a dark quiet room:  BETTER  [] Massage your head: BETTER  [] Cold pack on your head/neck:  BETTER  [] Hot pack on your head/neck:  not tried  [] Other:     Abortive Medications tried:  Excedrin Migraine, Naratriptan  Current prevenative medication list:  Amitriptyline  Any missed doses? Yes  Any other relevant information:  Had blurred vision prior to todays migraine. States she would like to try injectable preventative,  Any medications contraindicated? Tried and failed? Ana Jang to try Medrol Dosepak? Yes  Last taken:  2/20/2023  Willing to try Toradol/Decadron injections? Yes but it did not work very well  Last taken:  9/15/2022  Advised patient to seek immediate medical treatment in ED for any concerning symptoms or changes to condition.

## 2023-05-03 NOTE — TELEPHONE ENCOUNTER
From: Anthony Calles  To:  Pascale Padilla MD  Sent: 5/3/2023 1:54 PM CDT  Subject: Migraines     Can I get my steroid pack refilled for ongoing migraines   Zak Alaniz

## 2023-05-03 NOTE — TELEPHONE ENCOUNTER
Medication: methylPREDNISolone (MEDROL) 4 MG Oral Tablet Therapy Pack    Date of last refill: 02/20/23      Last office visit: 05/02/23  Due back to clinic per last office note:  na  Date next office visit scheduled:  na      Last OV note recommendation: Plan:   > Continue all over-the-counter medication to help manage symptoms and pain  > Have right hip x-ray completed  > Enroll in physical therapy for right hip pain, gait stability  > Discussed with primary care provider regarding possible Lyme titer due to progressive worsening joint pain  > Referral to rheumatology for generalized joint pain  > If hip x-ray does show degenerative changes could consider intra-articular right hip injection  > If no relief with injection would refer to [facility] orthopedic  No orders of the defined types were placed in this encounter.

## 2023-05-03 NOTE — TELEPHONE ENCOUNTER
Discussed sick call with Dr Mickey Galvez and received verba; order for Medrol Dose Pack and verbal order for CGRP injectable covered by her insurance. Per [facility] formulary review, Timmothy Grain is covered at Tier 3.

## 2023-05-04 ENCOUNTER — PATIENT MESSAGE (OUTPATIENT)
Dept: FAMILY MEDICINE CLINIC | Facility: CLINIC | Age: 68
End: 2023-05-04

## 2023-05-04 DIAGNOSIS — G43.701 CHRONIC MIGRAINE WITHOUT AURA WITH STATUS MIGRAINOSUS, NOT INTRACTABLE: ICD-10-CM

## 2023-05-04 RX ORDER — NARATRIPTAN 2.5 MG/1
TABLET ORAL
Qty: 8 TABLET | Refills: 0 | Status: SHIPPED | OUTPATIENT
Start: 2023-05-04

## 2023-05-04 NOTE — TELEPHONE ENCOUNTER
[employer] CGRP PA form for Aimovig 70mg completed and faxed along with LOV notes with fax confirmation received.

## 2023-05-05 NOTE — TELEPHONE ENCOUNTER
Aimovig 70mg approved 2/4/2023-5/5/2024. Approval letter faxed to [facility] with fax confirmation received.

## 2023-05-12 ENCOUNTER — PATIENT MESSAGE (OUTPATIENT)
Dept: NEUROLOGY | Facility: CLINIC | Age: 68
End: 2023-05-12

## 2023-05-15 ENCOUNTER — PATIENT MESSAGE (OUTPATIENT)
Dept: NEUROLOGY | Facility: CLINIC | Age: 68
End: 2023-05-15

## 2023-05-15 DIAGNOSIS — G43.519 MIGRAINE AURA, PERSISTENT, INTRACTABLE: Primary | ICD-10-CM

## 2023-05-16 RX ORDER — RIMEGEPANT SULFATE 75 MG/75MG
75 TABLET, ORALLY DISINTEGRATING ORAL AS NEEDED
Qty: 8 TABLET | Refills: 0 | Status: SHIPPED | OUTPATIENT
Start: 2023-05-16 | End: 2023-05-18

## 2023-05-16 NOTE — TELEPHONE ENCOUNTER
Spoke with patient and informed her that Gabbie Soto is covered by her insurance with a $47.00 co-pay. Patient states she cancelled the Nurtec that had been ordered previously because insurance would not cover. Per Epic review, Nurtec is covered through 8/12/23. Rx Nurtec 75mg #8 e scribed to [facility] per written order ($27.00 co-pay) per  [facility] pharmacist.  [employer] message sent to patient with above info.

## 2023-05-18 ENCOUNTER — OFFICE VISIT (OUTPATIENT)
Dept: FAMILY MEDICINE CLINIC | Facility: CLINIC | Age: 68
End: 2023-05-18
Payer: MEDICARE

## 2023-05-18 VITALS
HEART RATE: 91 BPM | WEIGHT: 181 LBS | TEMPERATURE: 97 F | RESPIRATION RATE: 16 BRPM | OXYGEN SATURATION: 95 % | SYSTOLIC BLOOD PRESSURE: 128 MMHG | DIASTOLIC BLOOD PRESSURE: 70 MMHG | BODY MASS INDEX: 30.16 KG/M2 | HEIGHT: 65 IN

## 2023-05-18 DIAGNOSIS — M79.7 FIBROMYALGIA: ICD-10-CM

## 2023-05-18 DIAGNOSIS — M25.50 POLYARTHRALGIA: Primary | ICD-10-CM

## 2023-05-18 DIAGNOSIS — E55.9 VITAMIN D DEFICIENCY: ICD-10-CM

## 2023-05-18 DIAGNOSIS — E78.2 MIXED HYPERLIPIDEMIA: ICD-10-CM

## 2023-05-18 DIAGNOSIS — G43.009 MIGRAINE WITHOUT AURA AND WITHOUT STATUS MIGRAINOSUS, NOT INTRACTABLE: ICD-10-CM

## 2023-05-18 DIAGNOSIS — R09.89 SINUS COMPLAINT: ICD-10-CM

## 2023-05-18 PROCEDURE — 99214 OFFICE O/P EST MOD 30 MIN: CPT | Performed by: FAMILY MEDICINE

## 2023-05-18 PROCEDURE — 3074F SYST BP LT 130 MM HG: CPT | Performed by: FAMILY MEDICINE

## 2023-05-18 PROCEDURE — 3008F BODY MASS INDEX DOCD: CPT | Performed by: FAMILY MEDICINE

## 2023-05-18 PROCEDURE — 3078F DIAST BP <80 MM HG: CPT | Performed by: FAMILY MEDICINE

## 2023-05-18 RX ORDER — AMITRIPTYLINE HYDROCHLORIDE 75 MG/1
TABLET, FILM COATED ORAL
COMMUNITY
Start: 2023-05-05 | End: 2023-05-18

## 2023-05-18 RX ORDER — AMITRIPTYLINE HYDROCHLORIDE 75 MG/1
75 TABLET, FILM COATED ORAL NIGHTLY
Qty: 30 TABLET | Refills: 0 | Status: SHIPPED | OUTPATIENT
Start: 2023-05-18

## 2023-05-28 NOTE — PROGRESS NOTES
Pt states she will start taking mirtazapine and stated that she is concerned about reaction with amitriptyline. Pt states she has has more severe margarines. Pt stated she has pain in shoulder and neck. 3

## 2023-05-29 ENCOUNTER — PATIENT MESSAGE (OUTPATIENT)
Dept: PAIN CLINIC | Facility: CLINIC | Age: 68
End: 2023-05-29

## 2023-05-30 NOTE — TELEPHONE ENCOUNTER
From: Deborah Skinner  To: RINA Marshall  Sent: 5/29/2023 7:49 PM CDT  Subject: Reschedule pain procedure     Hello I need to reschedule my pain injection for another day. Please have your office give me a call.    Star

## 2023-06-07 DIAGNOSIS — G43.519 MIGRAINE AURA, PERSISTENT, INTRACTABLE: ICD-10-CM

## 2023-06-07 DIAGNOSIS — G43.701 CHRONIC MIGRAINE WITHOUT AURA WITH STATUS MIGRAINOSUS, NOT INTRACTABLE: ICD-10-CM

## 2023-06-07 DIAGNOSIS — M62.89 MUSCLE TIGHTNESS: ICD-10-CM

## 2023-06-08 ENCOUNTER — PATIENT MESSAGE (OUTPATIENT)
Dept: NEUROLOGY | Facility: CLINIC | Age: 68
End: 2023-06-08

## 2023-06-08 DIAGNOSIS — G43.519 MIGRAINE AURA, PERSISTENT, INTRACTABLE: Primary | ICD-10-CM

## 2023-06-09 ENCOUNTER — TELEPHONE (OUTPATIENT)
Dept: NEUROLOGY | Facility: CLINIC | Age: 68
End: 2023-06-09

## 2023-06-09 RX ORDER — TIZANIDINE 2 MG/1
TABLET ORAL
Qty: 60 TABLET | Refills: 0 | Status: SHIPPED | OUTPATIENT
Start: 2023-06-09

## 2023-06-09 RX ORDER — NARATRIPTAN 2.5 MG/1
TABLET ORAL
Qty: 8 TABLET | Refills: 0 | Status: SHIPPED | OUTPATIENT
Start: 2023-06-09

## 2023-06-09 RX ORDER — RIMEGEPANT SULFATE 75 MG/75MG
TABLET, ORALLY DISINTEGRATING ORAL
Qty: 8 TABLET | Refills: 0 | Status: SHIPPED | OUTPATIENT
Start: 2023-06-09

## 2023-06-09 RX ORDER — SUMATRIPTAN 5 MG/1
SPRAY NASAL
Qty: 1 EACH | Refills: 0 | Status: SHIPPED | OUTPATIENT
Start: 2023-06-09

## 2023-06-09 RX ORDER — ERENUMAB-AOOE 140 MG/ML
140 INJECTION, SOLUTION SUBCUTANEOUS
Qty: 1 ML | Refills: 2 | Status: SHIPPED | OUTPATIENT
Start: 2023-06-09 | End: 2023-09-07

## 2023-06-09 NOTE — TELEPHONE ENCOUNTER
PA for Aimovig 140mg completed electronically. Awaiting determination. No PA required for Sumatriptan nasal spray.

## 2023-06-09 NOTE — TELEPHONE ENCOUNTER
Pt following up on refill. She prefers Nurtec because it worked best, but she only rec'd 8 pills. She has questions and is requesting a call back from RN. Pt's best CB # [phone]. Endorsed to [employer].

## 2023-06-09 NOTE — TELEPHONE ENCOUNTER
From: Genevieve Green  Sent: 6/8/2023 4:59 PM CDT  To: Suzy Atkinson Nurse  Subject: Migrane meds    The shot is amovig.

## 2023-06-09 NOTE — TELEPHONE ENCOUNTER
Spoke to patient regarding medication. She states that she was told to take nurtec daily and she is currently taking daily. Advised that this medication as prescribed is not used as a daily medication. Patient states she was told at last visit to do daily and her abortive is not working. Patient has filled naratriptan on 5/25/23 for 8 tablets and nurtec 05/18/2023 for 8 tablets.

## 2023-06-09 NOTE — TELEPHONE ENCOUNTER
Discussed patient condition with Dr Zena Bell who states \"patient can increase Amovig to 140mg and try Sumatriptan injection or nasal spray. If increased dose of Aimovig not effective, can see if insurance will approve Vyepti infusions\". Patient notified of above and states she would like to try Aimovig 140mg and prefers Sumatriptan nasal spray. Rx Aimovig 140mg and Rx Sumatriptan 5mg Nasal spray pended for MD review and approval.  Patient verbalized understanding about limiting use of abortives to 2-3 times per week.

## 2023-06-16 NOTE — TELEPHONE ENCOUNTER
Approval Details    Authorization number: [id number]   Authorized from March 11, 2023 to June 9, 2024

## 2023-07-03 NOTE — TELEPHONE ENCOUNTER
Rec'd incoming fax from [facility] stating that the patient's prescription for Naratriptan 2.5mg tabs falls within the qty limit set forth by the insurer, and therefore does not require PA.   Case Number: [id number]    Additionally, margaret holguin MST Score 2 or >

## 2023-07-06 ENCOUNTER — TELEPHONE (OUTPATIENT)
Dept: NEUROLOGY | Facility: CLINIC | Age: 68
End: 2023-07-06

## 2023-07-06 DIAGNOSIS — G43.701 CHRONIC MIGRAINE WITHOUT AURA WITH STATUS MIGRAINOSUS, NOT INTRACTABLE: ICD-10-CM

## 2023-07-06 RX ORDER — NARATRIPTAN 2.5 MG/1
TABLET ORAL
Qty: 8 TABLET | Refills: 0 | OUTPATIENT
Start: 2023-07-06

## 2023-07-06 RX ORDER — NARATRIPTAN 2.5 MG/1
2.5 TABLET ORAL AS NEEDED
Qty: 8 TABLET | Refills: 0 | Status: SHIPPED | OUTPATIENT
Start: 2023-07-06

## 2023-07-06 NOTE — TELEPHONE ENCOUNTER
Medication: naratriptan     Date of last refill: 6/9/23  [place]     Last office visit: 2/24/23  Due back to clinic per last office note:  not addressed  Date next office visit scheduled:    Future Appointments   Date Time Provider Siddhartha Aleman   8/21/2023  3:00 PM Tiffani Jean MD [address]   9/6/2023 11:20 AM Christiano Burton MD [facility]           Last OV note recommendation:    Per Dr. Siobhan Faith:  Plan:  Discussed extensively various options-  try Ajovy or Aimovig injections for migraine prevention  On Amitriptyline for both depression and migraines  Patient would like to start Ajovy injection     Follow with Pain medicine     Advise to see Rheumatology for polyarthralgia.

## 2023-07-06 NOTE — TELEPHONE ENCOUNTER
Date of last refill: *** (#***/***)  [place]     Last office visit: ***  Due back to clinic per last office note:  ***  Date next office visit scheduled:    Future Appointments   Date Time Provider Siddhartha Aleman   8/21/2023  3:00 PM Jadyn Blanco MD [address] [facility]   9/6/2023 11:20 AM Abraham Burton MD [facility]           Last OV note recommendation:    ***

## 2023-07-06 NOTE — TELEPHONE ENCOUNTER
Advised patient regarding medication. Patient is currently in the \"Donut hole\" and has to pay 7500 out of pocket before her medications are covered. Patient is not able to  her aimovig or nurtec abortive. Patient would like to have the naratriptan refill.  See alt TE 07/06/2023

## 2023-07-06 NOTE — TELEPHONE ENCOUNTER
PA completed on nurtec    [employer] medic[employer] advantage plan  Case number [id number]  04/07/2023-07/06/2024    Patient unable to afford at this time.

## 2023-07-06 NOTE — TELEPHONE ENCOUNTER
Patient calling to request refill of NARATRIPTAN HCL 2.5 [id number] [facility]     Patient informed of 48 hour refill policy excluding weekends and holidays. Informed patient prescription is sent directly to pharmacy. Further explained patient will not receive a call back once prescription is ready.

## 2023-09-06 ENCOUNTER — OFFICE VISIT (OUTPATIENT)
Dept: NEUROLOGY | Facility: CLINIC | Age: 68
End: 2023-09-06
Payer: MEDICARE

## 2023-09-06 VITALS
RESPIRATION RATE: 16 BRPM | BODY MASS INDEX: 30 KG/M2 | HEART RATE: 90 BPM | DIASTOLIC BLOOD PRESSURE: 72 MMHG | SYSTOLIC BLOOD PRESSURE: 122 MMHG | WEIGHT: 186 LBS

## 2023-09-06 DIAGNOSIS — G43.701 CHRONIC MIGRAINE WITHOUT AURA WITH STATUS MIGRAINOSUS, NOT INTRACTABLE: Primary | ICD-10-CM

## 2023-09-06 DIAGNOSIS — G43.019 INTRACTABLE MIGRAINE WITHOUT AURA AND WITHOUT STATUS MIGRAINOSUS: ICD-10-CM

## 2023-09-06 DIAGNOSIS — M79.18 MYOFASCIAL PAIN: ICD-10-CM

## 2023-09-06 DIAGNOSIS — M54.81 OCCIPITAL NEURALGIA OF LEFT SIDE: ICD-10-CM

## 2023-09-06 PROCEDURE — 64405 NJX AA&/STRD GR OCPL NRV: CPT | Performed by: OTHER

## 2023-09-06 PROCEDURE — 99213 OFFICE O/P EST LOW 20 MIN: CPT | Performed by: OTHER

## 2023-09-06 PROCEDURE — 3078F DIAST BP <80 MM HG: CPT | Performed by: OTHER

## 2023-09-06 PROCEDURE — 3074F SYST BP LT 130 MM HG: CPT | Performed by: OTHER

## 2023-09-06 PROCEDURE — 20552 NJX 1/MLT TRIGGER POINT 1/2: CPT | Performed by: OTHER

## 2023-09-06 PROCEDURE — 64400 NJX AA&/STRD TRIGEMINAL NRV: CPT | Performed by: OTHER

## 2023-09-06 RX ORDER — TIZANIDINE 2 MG/1
2 TABLET ORAL 2 TIMES DAILY PRN
Qty: 60 TABLET | Refills: 2 | Status: SHIPPED | OUTPATIENT
Start: 2023-09-06

## 2023-09-06 RX ORDER — BUPIVACAINE HYDROCHLORIDE 5 MG/ML
4 INJECTION, SOLUTION PERINEURAL ONCE
Status: DISCONTINUED | OUTPATIENT
Start: 2023-09-06 | End: 2023-09-06

## 2023-09-06 RX ORDER — BUPIVACAINE HYDROCHLORIDE 5 MG/ML
4 INJECTION, SOLUTION EPIDURAL; INTRACAUDAL ONCE
Status: COMPLETED | OUTPATIENT
Start: 2023-09-06 | End: 2023-09-06

## 2023-09-06 RX ORDER — TRIAMCINOLONE ACETONIDE 40 MG/ML
40 INJECTION, SUSPENSION INTRA-ARTICULAR; INTRAMUSCULAR ONCE
Status: COMPLETED | OUTPATIENT
Start: 2023-09-06 | End: 2023-09-06

## 2023-09-06 RX ADMIN — BUPIVACAINE HYDROCHLORIDE 4 ML: 5 INJECTION, SOLUTION EPIDURAL; INTRACAUDAL at 13:06:00

## 2023-09-06 NOTE — PROCEDURES
Indication: Occipital neuralgia, left temporal headache and myofascial pain  Consent obtained after discussing the risk and benefit    Procedure: The areas are prepped and cleaned with betadine scrub and alcohol. Left occipital nerve block, left auriculotemporal nerve block in trigger point performed in bilateral trapezius muscle performed using a mixture of 4 ml 0.5% Marcaine and 1 [id number] mg/ml. The procedure was completed successfully without complication during and after the injections, and the patient tolerated well throughout the procedure. Patient is advised to use ice packs at the sites to reduce post procedure soreness. In case of swallowing and breathing difficulties or enlarged hematoma in head; patient is advised to go emergency room immediately. Call us office for any further assistance. Patient verbalized understanding.

## 2023-09-06 NOTE — PROGRESS NOTES
HPI:    Patient ID: Emeterio Lee is a 74year old female. Patient is a 74year old female with history of chronic migraines. States continues to have frequent headaches - tried [facility] and Botox with mild benefit. Last Botox injection was in Sept 2021. Reports Nurtec is effective but insurance didn't approved it as preventive therapy. She is taking it as an abortive agent. She states left occipital pain is flaring up again and has pain in the posterior trapezius region. She also c/o left temporal  pain. She is requesting for nerve block    She was following with pain medicine and had a radiofrequency ablation of the left cervical medial branch done which provided some relief in the past      HISTORY:  Past Medical History:   Diagnosis Date    Abnormal Holter monitor finding 10/20/2005    rare PAC's and PVC's    Abnormal MRI, cervical spine 02/05/2008    uncovertebral degenerative change is suggested, right-sided C2 C3, and left sided C4 C5 with disc bulging noted also at the C4 C5 level.  small central protrusions effacing the subarachnoid space C5 C6 and C6 C7    Chest pain 02/2002    with exercises    Chronic fatigue syndrome     Colitis 04/2006    sigmoid erythema consistent with nonspecific colitis    Decreased sexual desire 02/1998    Depression with anxiety     psychiatry: Dr. Ivon Betts    Diverticulosis 04/2006    scattered, minimal    Endometriosis     s/p ablation therapy    Fibromyalgia     History of colon polyps     GI: Dr. Kel Kent    History of femur fracture 04/18/2012    s/p closed reduction percutaneous screw fixation of right femoral neck fracture  internal fixation of the right hip    HTN (hypertension)     Hyperlipidemia     IBD (inflammatory bowel disease)     IBS (irritable bowel syndrome) 03/17/2006    chronic, recurrent.  GI: Dr. Kel Kent    Intervertebral disc protrusion 04/06/2005    L5/S1 small central protrusion effacing the thecal sax with a small annular fissure in the posterior annular fibers    Migraines     neuro: Dr. Fausto Bowman    Nephrolithiasis     Osteopenia 05/31/2012    Palpitations 02/1998    likely anxiety related to dystrophic nails secondary to mechanical injury     Postmenopausal HRT (hormone replacement therapy)     Seasonal allergies     Vitamin D deficiency 03/04/2019      Past Surgical History:   Procedure Laterality Date    CHOLECYSTECTOMY  11/10    bile leak, sepsis, pancreatitis    COLONOSCOPY  10/16, 4/06, 9/00, 8/97 2000 polyps, 2006 no polyps    COLONOSCOPY  11/2019    COLONOSCOPY N/A 11/14/2019    Procedure: COLONOSCOPY, POSSIBLE BIOPSY, POSSIBLE POLYPECTOMY 09595;  Surgeon: Trisha Sultana MD;  Location: [address] [place] [address]    EGD SCOPE  2000, 3/18    normal, empiric dilation    ENDOMETR ABLATE, THERMAL  2000    ERCP,DIAGNOSTIC  2/11    LAPAROSCOPY,LYSIS ADHESNS      endometirosis    LEG/ANKLE SURGERY PROC UNLISTED  4/18/2012    closed reduction percutaneous screw fixation of right femoral neck fracture  internal fixation of the right hip    OTHER  April 2016    Cervical Facet injection, Ganglion block    REMOVAL GALLBLADDER      TUBAL LIGATION        Family History   Problem Relation Age of Onset    Lung Disorder Mother         COPD, emphysema    Colon Cancer Maternal Grandmother         colon    Cancer Other         ovarian and breast    Breast Cancer Paternal Aunt 20      Social History     Socioeconomic History    Marital status:     Number of children: 0   Occupational History    Occupation: on disability, former    Tobacco Use    Smoking status: Never    Smokeless tobacco: Never    Tobacco comments:     20 yrs of second hand smoke, mother smoked   Vaping Use    Vaping Use: Never used   Substance and Sexual Activity    Alcohol use:  Yes     Alcohol/week: 0.0 - 1.0 standard drinks of alcohol     Comment: occasional    Drug use: Never     Comment: salve    Sexual activity: Not Currently     Partners: Male Birth control/protection: Tubal Ligation     Comment: , together since 1994   Other Topics Concern    Caffeine Concern Yes     Comment: 1-1.5 cup of coffee in am    Exercise Yes     Comment: walking            Review of Systems   Constitutional: Negative. HENT: Negative. Eyes: Negative. Respiratory: Negative. Cardiovascular: Negative. Gastrointestinal: Negative. Endocrine: Negative. Genitourinary: Negative. Musculoskeletal:  Negative for neck stiffness. Neurological:  Positive for headaches. All other systems reviewed and are negative. Current Outpatient Medications   Medication Sig Dispense Refill    ALPRAZolam 0.5 MG Oral Tab Take 0.5-1 tablets (0.25-0.5 mg total) by mouth daily as needed for Anxiety. 30 tablet 2    amitriptyline 75 MG Oral Tab Take 1 tablet (75 mg total) by mouth nightly. 30 tablet 2    Naratriptan HCl 2.5 MG Oral Tab Take 1 tablet (2.5 mg total) by mouth as needed. 8 tablet 0    TIZANIDINE 2 MG Oral Tab TAKE 1 TABLET BY MOUTH TWICE DAILY AS NEEDED 60 tablet 0    NURTEC 75 MG Oral Tablet Dispersible DISSOLVE 1 TABLET(75 MG) ON THE TONGUE AT ONSET OF MIGRAINE AS NEEDED. MAXIMUM DOSE IN 24 HOURS IS 1 TABLET 75 MG 8 tablet 0    Cholecalciferol (VITAMIN D3) 250 MCG (49674 UT) Oral Cap Take 1 capsule by mouth daily. clonazePAM 0.5 MG Oral Tab Take 1.5-2 tablets (0.75-1 mg total) by mouth nightly as needed for Anxiety. 60 tablet 2    amLODIPine 5 MG Oral Tab Take 1 tablet (5 mg total) by mouth daily. LINZESS 72 MCG Oral Cap every other day. docusate sodium (DULCOLAX PINK STOOL SOFTENER) 100 MG Oral Cap Take 1 capsule (100 mg total) by mouth 2 (two) times daily. 30 capsule 0    dicyclomine 20 MG Oral Tab Take 1 tablet (20 mg total) by mouth 4 (four) times daily.       estradiol 1 MG Oral Tab       progesterone 100 MG Oral Cap       albuterol (PROAIR HFA) 108 (90 Base) MCG/ACT Inhalation Aero Soln Inhale 2 puffs into the lungs every 6 (six) hours as needed for Wheezing. 18 g 1    Esomeprazole Magnesium 20 MG Oral Capsule Delayed Release Take 2 capsules (40 mg total) by mouth nightly. Ibuprofen 200 MG Oral Cap Take by mouth. MULTIVITAMIN TAB/CAP Take 1 tablet by mouth daily. Allergies:  Codeine                 ITCHING  Morphine                NAUSEA AND VOMITING, ITCHING  Sulfa Antibiotics       ITCHING  Statins                 MYALGIA  Topamax [Topiramate]        Comment:Blurry vision  Alc-Benzyl Alc-Sulf*    RASH  Amoxicillin-Pot Cla*        Comment:Other reaction(s): rash  Bactrim [Sulfametho*    ITCHING  Cefdinir                DIARRHEA    Comment:Abdominal pain  Cefdinir                OTHER (SEE COMMENTS)  Ciprofloxacin           ITCHING  Citric Acid             OTHER (SEE COMMENTS)  Clarithromycin          NAUSEA ONLY    Comment:Nausea and abdominal cramping  Dairy Products          OTHER (SEE COMMENTS)  Dust                      Hydrocodone             ITCHING  Lactose                 OTHER (SEE COMMENTS)  Pain Relief [Goodys*    ITCHING    Comment:Any strong pain med, Norco, Oxycodone, etc.  Peppermint Flavor         Reglan [Metoclopram*    PALPITATIONS, OTHER (SEE COMMENTS)    Comment:Hypertension with Reglan administration via IV  Seasonal                  Tomato                  OTHER (SEE COMMENTS)  Zomig [Zolmitriptan]    ITCHING   PHYSICAL EXAM:   Physical Exam     Blood pressure 122/72, pulse 90, resp. rate 16, weight 186 lb (84.4 kg), not currently breastfeeding. General: A pleasant 74year old female in no apparent distress  HEENT: Normocephalic and atraumatic. Neck: Normal range of motion. + tenderness in left trapezial region  Cardiovascular: Normal rate, regular rhythm and normal heart sounds. Pulmonary/Chest: Effort normal and breath sounds normal.   Abdominal: Soft. Bowel sounds are normal.   Skin: Skin is warm and dry. Psychiatric:normal mood and affect.    NEUROLOGICAL: This patient is alert and orientated x 3. Speech is fluent with intact comprehension. CN: Pupils equally round and reactive to light. 3+ brisk bilaterally. EOMs intact. Visual fields are full. Face is symmetrical. Tongue is midline. Uvula and palate elevate symmetrically. Shrug shoulders normally bilaterally. The rest of the cranial nerves are grossly intact. Sensation to light touch is intact bilaterally. Motor: Normal tone. No arm or leg weakness noted, strength approximately 5/5 bilaterally. Finger-to-nose coordination is intact. Gait brisk and steady. ASSESSMENT/PLAN:     (G43.701) Chronic migraine without aura with status migrainosus, not intractable  (primary encounter diagnosis)  Plan: triamcinolone acetonide (Kenalog-40) 40 MG/ML         injection 40 mg, bupivacaine PF (Marcaine) 0.5%        injection, DISCONTINUED: Bupivacaine HCl         (MARCAINE) 0.5 % injection 20 mg        (M54.81) Occipital neuralgia of left side      (G43.019) Intractable migraine without aura and without status migrainosus      (M79.18) Myofascial pain    Left auriculotemporal and occipital nerve block We will also add trigger point injection  2. Continue Nurtec 75 mg as needed for acute migraine relief  Insurance didn't approved Nurtec for preventive therapy  Failed CRGP monthly injection and Botox therapy  3. Tizanidine as needed for neck stiffness  On amitriptyline from fibromyalgia and migraine prophylaxis      See orders and medications filed with this encounter. The patient indicates understanding of these issues and agrees with the plan. No orders of the defined types were placed in this encounter.       Meds This Visit:  Requested Prescriptions      No prescriptions requested or ordered in this encounter       Imaging & Referrals:  None       ID#[id number]  HPI

## 2023-09-07 ENCOUNTER — TELEPHONE (OUTPATIENT)
Dept: NEUROLOGY | Facility: CLINIC | Age: 68
End: 2023-09-07

## 2023-09-07 RX ORDER — RIMEGEPANT SULFATE 75 MG/75MG
75 TABLET, ORALLY DISINTEGRATING ORAL EVERY OTHER DAY
Qty: 16 TABLET | Refills: 0 | Status: SHIPPED | OUTPATIENT
Start: 2023-09-07

## 2023-09-08 ENCOUNTER — LAB ENCOUNTER (OUTPATIENT)
Dept: LAB | Age: 68
End: 2023-09-08
Attending: FAMILY MEDICINE
Payer: MEDICARE

## 2023-09-08 DIAGNOSIS — E55.9 VITAMIN D DEFICIENCY: ICD-10-CM

## 2023-09-08 DIAGNOSIS — M25.50 POLYARTHRALGIA: ICD-10-CM

## 2023-09-08 DIAGNOSIS — E78.2 MIXED HYPERLIPIDEMIA: ICD-10-CM

## 2023-09-08 LAB
ALBUMIN SERPL-MCNC: 3.7 G/DL (ref 3.4–5)
ALBUMIN/GLOB SERPL: 0.9 {RATIO} (ref 1–2)
ALP LIVER SERPL-CCNC: 32 U/L
ALT SERPL-CCNC: 22 U/L
ANION GAP SERPL CALC-SCNC: 7 MMOL/L (ref 0–18)
AST SERPL-CCNC: 10 U/L (ref 15–37)
BILIRUB SERPL-MCNC: 0.3 MG/DL (ref 0.1–2)
BUN BLD-MCNC: 15 MG/DL (ref 7–18)
CALCIUM BLD-MCNC: 9.8 MG/DL (ref 8.5–10.1)
CHLORIDE SERPL-SCNC: 105 MMOL/L (ref 98–112)
CHOLEST SERPL-MCNC: 289 MG/DL (ref ?–200)
CO2 SERPL-SCNC: 26 MMOL/L (ref 21–32)
CREAT BLD-MCNC: 0.92 MG/DL
CRP SERPL-MCNC: 0.57 MG/DL (ref ?–0.3)
EGFRCR SERPLBLD CKD-EPI 2021: 69 ML/MIN/1.73M2 (ref 60–?)
ERYTHROCYTE [SEDIMENTATION RATE] IN BLOOD: 18 MM/HR
FASTING PATIENT LIPID ANSWER: YES
FASTING STATUS PATIENT QL REPORTED: YES
GLOBULIN PLAS-MCNC: 4 G/DL (ref 2.8–4.4)
GLUCOSE BLD-MCNC: 86 MG/DL (ref 70–99)
HDLC SERPL-MCNC: 77 MG/DL (ref 40–59)
LDLC SERPL CALC-MCNC: 173 MG/DL (ref ?–100)
NONHDLC SERPL-MCNC: 212 MG/DL (ref ?–130)
OSMOLALITY SERPL CALC.SUM OF ELEC: 286 MOSM/KG (ref 275–295)
POTASSIUM SERPL-SCNC: 4.3 MMOL/L (ref 3.5–5.1)
PROT SERPL-MCNC: 7.7 G/DL (ref 6.4–8.2)
RHEUMATOID FACT SERPL-ACNC: <10 IU/ML (ref ?–15)
SODIUM SERPL-SCNC: 138 MMOL/L (ref 136–145)
TRIGL SERPL-MCNC: 213 MG/DL (ref 30–149)
URATE SERPL-MCNC: 5.1 MG/DL
VIT D+METAB SERPL-MCNC: 23.1 NG/ML (ref 30–100)
VLDLC SERPL CALC-MCNC: 43 MG/DL (ref 0–30)

## 2023-09-08 PROCEDURE — 82306 VITAMIN D 25 HYDROXY: CPT

## 2023-09-08 PROCEDURE — 86038 ANTINUCLEAR ANTIBODIES: CPT

## 2023-09-08 PROCEDURE — 86200 CCP ANTIBODY: CPT

## 2023-09-08 PROCEDURE — 36415 COLL VENOUS BLD VENIPUNCTURE: CPT

## 2023-09-08 PROCEDURE — 86225 DNA ANTIBODY NATIVE: CPT

## 2023-09-08 PROCEDURE — 85652 RBC SED RATE AUTOMATED: CPT

## 2023-09-08 PROCEDURE — 80061 LIPID PANEL: CPT

## 2023-09-08 PROCEDURE — 86431 RHEUMATOID FACTOR QUANT: CPT

## 2023-09-08 PROCEDURE — 86618 LYME DISEASE ANTIBODY: CPT

## 2023-09-08 PROCEDURE — 86140 C-REACTIVE PROTEIN: CPT

## 2023-09-08 PROCEDURE — 80053 COMPREHEN METABOLIC PANEL: CPT

## 2023-09-08 PROCEDURE — 84550 ASSAY OF BLOOD/URIC ACID: CPT

## 2023-09-11 ENCOUNTER — PATIENT MESSAGE (OUTPATIENT)
Dept: FAMILY MEDICINE CLINIC | Facility: CLINIC | Age: 68
End: 2023-09-11

## 2023-09-11 LAB
B BURGDOR IGG+IGM SER QL: NEGATIVE
CCP IGG SERPL-ACNC: 1.7 U/ML (ref 0–6.9)
DSDNA IGG SERPL IA-ACNC: 2.4 IU/ML
ENA AB SER QL IA: <0.09 UG/L
ENA AB SER QL IA: NEGATIVE

## 2023-09-12 ENCOUNTER — TELEPHONE (OUTPATIENT)
Dept: NEUROLOGY | Facility: CLINIC | Age: 68
End: 2023-09-12

## 2023-09-12 DIAGNOSIS — G43.701 CHRONIC MIGRAINE WITHOUT AURA WITH STATUS MIGRAINOSUS, NOT INTRACTABLE: ICD-10-CM

## 2023-09-13 DIAGNOSIS — G43.701 CHRONIC MIGRAINE WITHOUT AURA WITH STATUS MIGRAINOSUS, NOT INTRACTABLE: ICD-10-CM

## 2023-09-13 RX ORDER — NARATRIPTAN 2.5 MG/1
2.5 TABLET ORAL AS NEEDED
Qty: 8 TABLET | Refills: 0 | OUTPATIENT
Start: 2023-09-13

## 2023-09-13 RX ORDER — METHYLPREDNISOLONE 4 MG/1
TABLET ORAL
Qty: 1 EACH | Refills: 0 | Status: SHIPPED | OUTPATIENT
Start: 2023-09-13

## 2023-09-13 RX ORDER — NARATRIPTAN 2.5 MG/1
2.5 TABLET ORAL AS NEEDED
Qty: 8 TABLET | Refills: 1 | Status: SHIPPED | OUTPATIENT
Start: 2023-09-13

## 2023-09-20 ENCOUNTER — TELEMEDICINE (OUTPATIENT)
Dept: FAMILY MEDICINE CLINIC | Facility: CLINIC | Age: 68
End: 2023-09-20
Payer: MEDICARE

## 2023-09-20 DIAGNOSIS — R79.82 ELEVATED C-REACTIVE PROTEIN (CRP): ICD-10-CM

## 2023-09-20 DIAGNOSIS — M79.7 FIBROMYALGIA: ICD-10-CM

## 2023-09-20 DIAGNOSIS — E78.2 MIXED HYPERLIPIDEMIA: Primary | ICD-10-CM

## 2023-09-20 DIAGNOSIS — M25.50 POLYARTHRALGIA: ICD-10-CM

## 2023-09-20 DIAGNOSIS — E66.9 OBESITY (BMI 30-39.9): ICD-10-CM

## 2023-09-20 PROBLEM — I70.0 ABDOMINAL AORTIC ATHEROSCLEROSIS: Status: RESOLVED | Noted: 2019-03-04 | Resolved: 2023-09-20

## 2023-09-20 PROBLEM — I70.0 ABDOMINAL AORTIC ATHEROSCLEROSIS (HCC): Status: RESOLVED | Noted: 2019-03-04 | Resolved: 2023-09-20

## 2023-09-20 PROCEDURE — 99214 OFFICE O/P EST MOD 30 MIN: CPT | Performed by: FAMILY MEDICINE

## 2023-09-20 PROCEDURE — 1170F FXNL STATUS ASSESSED: CPT | Performed by: FAMILY MEDICINE

## 2023-09-20 PROCEDURE — 1160F RVW MEDS BY RX/DR IN RCRD: CPT | Performed by: FAMILY MEDICINE

## 2023-09-20 PROCEDURE — 1159F MED LIST DOCD IN RCRD: CPT | Performed by: FAMILY MEDICINE

## 2023-09-20 RX ORDER — EZETIMIBE 10 MG/1
10 TABLET ORAL NIGHTLY
Qty: 30 TABLET | Refills: 2 | Status: SHIPPED | OUTPATIENT
Start: 2023-09-20

## 2023-09-23 NOTE — PROGRESS NOTES
Video visit  Please note that the following visit was completed using two-way, real-time interactive audio and video communication. This has been done in good leonila to provide continuity of care in the best interest of the provider-patient relationship, due to the ongoing public health crisis/national emergency and because of restrictions of visitation. There are limitations of this visit, as no physical exam could be performed. Visit was planned and structured to allow sufficient time to address the issues presented. Billing for this visit takes into account review of history, labs, medications, radiology tests , consultations and/or decision making. Appropriate medical decision-making and tests are ordered as detailed in the assessment and plan of care. HPI  69yr old female presents for f/u on labs. HL, unable to tolerate statins due to myalgias. States she watches her diet but limited for exercise due to her joint pains. Multiple joint pains and has fibromyalgia, had recent autoimmune/rheum testing done. Obesity, struggling to lose weight. Interested in seeing [facility]. History/Other:   Review of Systems   Constitutional:  Positive for unexpected weight change (wt gain). Musculoskeletal:  Positive for arthralgias and myalgias. Skin:  Negative for rash. Current Outpatient Medications   Medication Sig Dispense Refill    ezetimibe (ZETIA) 10 MG Oral Tab Take 1 tablet (10 mg total) by mouth nightly. 30 tablet 2    Naratriptan HCl 2.5 MG Oral Tab Take 1 tablet (2.5 mg total) by mouth as needed. 8 tablet 1    tiZANidine 2 MG Oral Tab Take 1 tablet (2 mg total) by mouth 2 (two) times daily as needed. 60 tablet 2    ALPRAZolam 0.5 MG Oral Tab Take 0.5-1 tablets (0.25-0.5 mg total) by mouth daily as needed for Anxiety. 30 tablet 2    amitriptyline 75 MG Oral Tab Take 1 tablet (75 mg total) by mouth nightly.  30 tablet 2    Cholecalciferol (VITAMIN D3) 250 MCG (18746 UT) Oral Cap Take 1 capsule by mouth daily. 3000 international unit      amLODIPine 5 MG Oral Tab Take 1 tablet (5 mg total) by mouth daily. docusate sodium (DULCOLAX PINK STOOL SOFTENER) 100 MG Oral Cap Take 1 capsule (100 mg total) by mouth 2 (two) times daily. 30 capsule 0    dicyclomine 20 MG Oral Tab Take 1 tablet (20 mg total) by mouth 4 (four) times daily. estradiol 1 MG Oral Tab       progesterone 100 MG Oral Cap       albuterol (PROAIR HFA) 108 (90 Base) MCG/ACT Inhalation Aero Soln Inhale 2 puffs into the lungs every 6 (six) hours as needed for Wheezing. 18 g 1    Esomeprazole Magnesium 20 MG Oral Capsule Delayed Release Take 2 capsules (40 mg total) by mouth nightly. Ibuprofen 200 MG Oral Cap Take by mouth. MULTIVITAMIN TAB/CAP Take 1 tablet by mouth daily. clonazePAM 0.5 MG Oral Tab Take 1.5-2 tablets (0.75-1 mg total) by mouth nightly as needed for Anxiety. 60 tablet 2    Rimegepant Sulfate (NURTEC) 75 MG Oral Tablet Dispersible Take 75 mg by mouth every other day. Maximum dose in 24 hours is 1 tablet (75mg). Take as preventative. (Patient not taking: Reported on 9/20/2023) 16 tablet 0    NURTEC 75 MG Oral Tablet Dispersible DISSOLVE 1 TABLET(75 MG) ON THE TONGUE AT ONSET OF MIGRAINE AS NEEDED. MAXIMUM DOSE IN 24 HOURS IS 1 TABLET 75 MG (Patient not taking: Reported on 9/20/2023) 8 tablet 0    LINZESS 72 MCG Oral Cap every other day.  (Patient not taking: Reported on 9/20/2023)       Allergies:  Codeine                 ITCHING  Morphine                NAUSEA AND VOMITING, ITCHING  Sulfa Antibiotics       ITCHING  Statins                 MYALGIA  Alc-Benzyl Alc-Sulf*    RASH  Amoxicillin-Pot Cla*        Comment:Other reaction(s): rash  Bactrim [Sulfametho*    ITCHING  Cefdinir                DIARRHEA    Comment:Abdominal pain  Cefdinir                OTHER (SEE COMMENTS)  Ciprofloxacin           ITCHING  Citric Acid             OTHER (SEE COMMENTS)  Clarithromycin          NAUSEA ONLY    Comment:Nausea and abdominal cramping  Dairy Products          OTHER (SEE COMMENTS)  Dust                      Hydrocodone             ITCHING  Lactose                 OTHER (SEE COMMENTS)  Pain Relief [Goodys*    ITCHING    Comment:Any strong pain med, Norco, Oxycodone, etc.  Peppermint Flavor         Reglan [Metoclopram*    PALPITATIONS, OTHER (SEE COMMENTS)    Comment:Hypertension with Reglan administration via IV  Seasonal                  Tomato                  OTHER (SEE COMMENTS)  Zomig [Zolmitriptan]    ITCHING    Objective:   Physical Exam  Vitals and nursing note reviewed. Constitutional:       Appearance: Normal appearance. HENT:      Head: Normocephalic and atraumatic. Pulmonary:      Effort: Pulmonary effort is normal.   Neurological:      Mental Status: She is alert and oriented to person, place, and time. Psychiatric:         Mood and Affect: Mood normal.         Behavior: Behavior normal.         Assessment & Plan:   1. Mixed hyperlipidemia  - lipid panel: , HDL 77,  and   - advised low fat/chol diet and regular exercise as able  - unable to tolerate statins  - will start zetia 10mg po at bedtime, reviewed indications, dosing and SEs  - has upcoming appt with cards as well  - recheck labs in 3mo  - ezetimibe (ZETIA) 10 MG Oral Tab; Take 1 tablet (10 mg total) by mouth nightly. Dispense: 30 tablet; Refill: 2  - Comp Metabolic Panel (14) [E]; Future  - Lipid Panel [E]; Future    2. Polyarthralgia  3. Elevated C-reactive protein (CRP)  4. Fibromyalgia  - CRP: 0.57  - will refer to rheum, Dr. Hema Guerrero   - Rheumatology Referral - [facility])    5.  Obesity (BMI 30-39.9)  - wt loss with diet and exercise as able  - refer to [address] [facility] - Emilia Durbin [facility] [address]    She understands and agrees with tx plan  RTC for MAPS visit    Orders Placed This Encounter      Comp Metabolic Panel (14) [E]      Lipid Panel [E]      Meds This Visit:  Requested Prescriptions     Signed Prescriptions Disp Refills    ezetimibe (ZETIA) 10 MG Oral Tab 30 tablet 2     Sig: Take 1 tablet (10 mg total) by mouth nightly.        Imaging & Referrals:  RHEUMATOLOGY - INTERNAL  OP REFERRAL TO WEIGHT LOSS CLINIC

## 2023-10-17 ENCOUNTER — OFFICE VISIT (OUTPATIENT)
Dept: PAIN CLINIC | Facility: CLINIC | Age: 68
End: 2023-10-17
Payer: MEDICARE

## 2023-10-17 VITALS — HEART RATE: 97 BPM | DIASTOLIC BLOOD PRESSURE: 80 MMHG | SYSTOLIC BLOOD PRESSURE: 122 MMHG | OXYGEN SATURATION: 97 %

## 2023-10-17 DIAGNOSIS — M47.812 CERVICAL SPONDYLOSIS: Primary | ICD-10-CM

## 2023-10-17 PROCEDURE — 99214 OFFICE O/P EST MOD 30 MIN: CPT | Performed by: PHYSICIAN ASSISTANT

## 2023-10-17 PROCEDURE — 3079F DIAST BP 80-89 MM HG: CPT | Performed by: PHYSICIAN ASSISTANT

## 2023-10-17 PROCEDURE — 1160F RVW MEDS BY RX/DR IN RCRD: CPT | Performed by: PHYSICIAN ASSISTANT

## 2023-10-17 PROCEDURE — 3074F SYST BP LT 130 MM HG: CPT | Performed by: PHYSICIAN ASSISTANT

## 2023-10-17 PROCEDURE — 1159F MED LIST DOCD IN RCRD: CPT | Performed by: PHYSICIAN ASSISTANT

## 2023-10-17 NOTE — PROGRESS NOTES
Patient presents in office today with reported pain in left side neck and head    Current pain level reported = 3-4/10, increases after looking down at computer    Last reported dose of n/a      Narcotic Contract renewal n/a    Urine Drug screen n/a

## 2023-10-19 ENCOUNTER — TELEPHONE (OUTPATIENT)
Dept: PAIN CLINIC | Facility: CLINIC | Age: 68
End: 2023-10-19

## 2023-10-19 NOTE — TELEPHONE ENCOUNTER
Prior Authorization for left C5/6,C67 facets    initiated with deneen  CPT codes: 97267,00505  Request for injection pending review, yes   pending reference # [id number]  Clinical notes submitted via  uploaded clinicals to chart

## 2023-10-23 NOTE — TELEPHONE ENCOUNTER
Prior authorization request completed for: left C5/6, C6/7 Facet   Authorization # [id number], [id number]  Authorization dates:  [id number]  CPT codes approved:  35627, 05200  Number of visits/dates of service approved: 1  Physician: claire  Location: [facility]       Patient can be scheduled. Routed to Navigator.

## 2023-10-31 ENCOUNTER — APPOINTMENT (OUTPATIENT)
Dept: GENERAL RADIOLOGY | Facility: HOSPITAL | Age: 68
End: 2023-10-31
Attending: ANESTHESIOLOGY
Payer: MEDICARE

## 2023-10-31 ENCOUNTER — HOSPITAL ENCOUNTER (OUTPATIENT)
Facility: HOSPITAL | Age: 68
Setting detail: HOSPITAL OUTPATIENT SURGERY
Discharge: HOME | End: 2023-10-31
Attending: ANESTHESIOLOGY | Admitting: ANESTHESIOLOGY
Payer: MEDICARE

## 2023-10-31 VITALS
HEART RATE: 77 BPM | SYSTOLIC BLOOD PRESSURE: 131 MMHG | HEIGHT: 66 IN | WEIGHT: 186 LBS | TEMPERATURE: 98 F | DIASTOLIC BLOOD PRESSURE: 81 MMHG | BODY MASS INDEX: 29.89 KG/M2 | RESPIRATION RATE: 16 BRPM | OXYGEN SATURATION: 100 %

## 2023-10-31 PROCEDURE — 3E0U33Z INTRODUCTION OF ANTI-INFLAMMATORY INTO JOINTS, PERCUTANEOUS APPROACH: ICD-10-PCS | Performed by: ANESTHESIOLOGY

## 2023-10-31 PROCEDURE — 64491 INJ PARAVERT F JNT C/T 2 LEV: CPT | Performed by: ANESTHESIOLOGY

## 2023-10-31 PROCEDURE — 64490 INJ PARAVERT F JNT C/T 1 LEV: CPT | Performed by: ANESTHESIOLOGY

## 2023-10-31 PROCEDURE — 3E0U3BZ INTRODUCTION OF ANESTHETIC AGENT INTO JOINTS, PERCUTANEOUS APPROACH: ICD-10-PCS | Performed by: ANESTHESIOLOGY

## 2023-10-31 RX ORDER — LIDOCAINE HYDROCHLORIDE 10 MG/ML
INJECTION, SOLUTION EPIDURAL; INFILTRATION; INTRACAUDAL; PERINEURAL
Status: DISCONTINUED | OUTPATIENT
Start: 2023-10-31 | End: 2023-10-31

## 2023-10-31 RX ORDER — SODIUM CHLORIDE 9 MG/ML
INJECTION INTRAVENOUS
Status: DISCONTINUED | OUTPATIENT
Start: 2023-10-31 | End: 2023-10-31

## 2023-10-31 RX ORDER — DEXAMETHASONE SODIUM PHOSPHATE 10 MG/ML
INJECTION, SOLUTION INTRAMUSCULAR; INTRAVENOUS
Status: DISCONTINUED | OUTPATIENT
Start: 2023-10-31 | End: 2023-10-31

## 2023-10-31 NOTE — H&P
History & Physical Examination    Patient Name: Jan Mckee  MRN: [id number]  CSN: [id number]  YOB: 1955    Pre-Operative Diagnosis:  Cervical spondylosis [M47.812]    Present Illness: Patient with cervical spondylosis    [COMPLETED] triamcinolone acetonide (Kenalog-40) 40 MG/ML injection 40 mg, 40 mg, Intramuscular, Once, González Cassidy MD, 40 mg at 23 1301  [COMPLETED] bupivacaine PF (Marcaine) 0.5% injection, 4 mL, Injection, Once, González Cassidy MD, 4 mL at 23 1306    clonazePAM 0.5 MG Oral Tab, Take 1.5-2 tablets (0.75-1 mg total) by mouth nightly as needed for Anxiety. , Disp: 60 tablet, Rfl: 2  ezetimibe (ZETIA) 10 MG Oral Tab, Take 1 tablet (10 mg total) by mouth nightly., Disp: 30 tablet, Rfl: 2  Naratriptan HCl 2.5 MG Oral Tab, Take 1 tablet (2.5 mg total) by mouth as needed. , Disp: 8 tablet, Rfl: 1  tiZANidine 2 MG Oral Tab, Take 1 tablet (2 mg total) by mouth 2 (two) times daily as needed. , Disp: 60 tablet, Rfl: 2  ALPRAZolam 0.5 MG Oral Tab, Take 0.5-1 tablets (0.25-0.5 mg total) by mouth daily as needed for Anxiety. , Disp: 30 tablet, Rfl: 2  amitriptyline 75 MG Oral Tab, Take 1 tablet (75 mg total) by mouth nightly., Disp: 30 tablet, Rfl: 2  [] escitalopram 10 MG Oral Tab, Take 0.5 tablets (5 mg total) by mouth daily for 7 days, THEN 1 tablet (10 mg total) daily for 23 days. , Disp: 27 tablet, Rfl: 0  Cholecalciferol (VITAMIN D3) 250 MCG (67382 UT) Oral Cap, Take 1 capsule by mouth daily. 3000 international unit, Disp: , Rfl:   amLODIPine 5 MG Oral Tab, Take 1 tablet (5 mg total) by mouth daily. , Disp: , Rfl:   LINZESS 72 MCG Oral Cap, every other day. (Patient not taking: Reported on 2023), Disp: , Rfl:   docusate sodium (DULCOLAX PINK STOOL SOFTENER) 100 MG Oral Cap, Take 1 capsule (100 mg total) by mouth 2 (two) times daily. , Disp: 30 capsule, Rfl: 0  dicyclomine 20 MG Oral Tab, Take 1 tablet (20 mg total) by mouth 4 (four) times daily. , Disp: , Rfl: estradiol 1 MG Oral Tab, , Disp: , Rfl:   progesterone 100 MG Oral Cap, , Disp: , Rfl:   albuterol (PROAIR HFA) 108 (90 Base) MCG/ACT Inhalation Aero Soln, Inhale 2 puffs into the lungs every 6 (six) hours as needed for Wheezing., Disp: 18 g, Rfl: 1  Esomeprazole Magnesium 20 MG Oral Capsule Delayed Release, Take 2 capsules (40 mg total) by mouth nightly., Disp: , Rfl:   Ibuprofen 200 MG Oral Cap, Take by mouth., Disp: , Rfl:   MULTIVITAMIN TAB/CAP, Take 1 tablet by mouth daily. , Disp: , Rfl:       No current facility-administered medications for this encounter.       Allergies:   Codeine                 ITCHING  Morphine                NAUSEA AND VOMITING, ITCHING  Sulfa Antibiotics       ITCHING  Statins                 MYALGIA  Alc-Benzyl Alc-Sulf*    RASH  Amoxicillin-Pot Cla*        Comment:Other reaction(s): rash  Bactrim [Sulfametho*    ITCHING  Cefdinir                DIARRHEA    Comment:Abdominal pain  Cefdinir                OTHER (SEE COMMENTS)  Ciprofloxacin           ITCHING  Citric Acid             OTHER (SEE COMMENTS)  Clarithromycin          NAUSEA ONLY    Comment:Nausea and abdominal cramping  Dairy Products          OTHER (SEE COMMENTS)  Dust                      Hydrocodone             ITCHING  Lactose                 OTHER (SEE COMMENTS)  Pain Relief [Goodys*    ITCHING    Comment:Any strong pain med, Norco, Oxycodone, etc.  Peppermint Flavor         Reglan [Metoclopram*    PALPITATIONS, OTHER (SEE COMMENTS)    Comment:Hypertension with Reglan administration via IV  Seasonal                  Tomato                  OTHER (SEE COMMENTS)  Zomig [Zolmitriptan]    ITCHING    Past Medical History:   Diagnosis Date    Abnormal Holter monitor finding 10/20/2005    rare PAC's and PVC's    Abnormal MRI, cervical spine 02/05/2008    uncovertebral degenerative change is suggested, right-sided C2 C3, and left sided C4 C5 with disc bulging noted also at the C4 C5 level.  small central protrusions effacing the subarachnoid space C5 C6 and C6 C7    Chest pain 02/2002    with exercises    Chronic fatigue syndrome     Colitis 04/2006    sigmoid erythema consistent with nonspecific colitis    Decreased sexual desire 02/1998    Depression with anxiety     psychiatry: Dr. Carmen Bonner    Diverticulosis 04/2006    scattered, minimal    Endometriosis     s/p ablation therapy    Fibromyalgia     History of colon polyps     GI: Dr. Katherin Rock    History of femur fracture 04/18/2012    s/p closed reduction percutaneous screw fixation of right femoral neck fracture  internal fixation of the right hip    HTN (hypertension)     Hyperlipidemia     IBD (inflammatory bowel disease)     IBS (irritable bowel syndrome) 03/17/2006    chronic, recurrent.  GI: Dr. Katherin Rock    Intervertebral disc protrusion 04/06/2005    L5/S1 small central protrusion effacing the thecal sax with a small annular fissure in the posterior annular fibers    Migraines     neuro: Dr. Shai Lazo    Nephrolithiasis     Osteopenia 05/31/2012    Palpitations 02/1998    likely anxiety related to dystrophic nails secondary to mechanical injury     Postmenopausal HRT (hormone replacement therapy)     Seasonal allergies     Vitamin D deficiency 03/04/2019     Past Surgical History:   Procedure Laterality Date    CHOLECYSTECTOMY  11/10    bile leak, sepsis, pancreatitis    COLONOSCOPY  10/16, 4/06, 9/00, 8/97 2000 polyps, 2006 no polyps    COLONOSCOPY  11/2019    COLONOSCOPY N/A 11/14/2019    Procedure: COLONOSCOPY, POSSIBLE BIOPSY, POSSIBLE POLYPECTOMY 35600;  Surgeon: Duy Trujillo MD;  Location: [address] [place] [address]    EGD SCOPE  2000, 3/18    normal, empiric dilation    ENDOMETR ABLATE, THERMAL  2000    ERCP,DIAGNOSTIC  2/11    LAPAROSCOPY,LYSIS ADHESNS      endometirosis    LEG/ANKLE SURGERY PROC UNLISTED  4/18/2012    closed reduction percutaneous screw fixation of right femoral neck fracture  internal fixation of the right hip    OTHER April 2016    Cervical Facet injection, Ganglion block    REMOVAL GALLBLADDER      TUBAL LIGATION       Family History   Problem Relation Age of Onset    Lung Disorder Mother         COPD, emphysema    Colon Cancer Maternal Grandmother         colon    Cancer Other         ovarian and breast    Breast Cancer Paternal Aunt 21     Social History    Tobacco Use      Smoking status: Never      Smokeless tobacco: Never      Tobacco comments: 20 yrs of second hand smoke, mother smoked    Alcohol use: Yes      Alcohol/week: 0.0 - 1.0 standard drinks of alcohol      Comment: occasional      SYSTEM Check if Review is Normal Check if Physical Exam is Normal If not normal, please explain:   HEENT [x ] [x ]    NECK & BACK [x ] [x ]    HEART [x ] [x ]    LUNGS [x ] [x ]    ABDOMEN [x ] [x ]    UROGENITAL [x ] [x ]    EXTREMITIES [x ] [x ]    OTHER        [ x ] I have discussed the risks and benefits and alternatives with the patient/family. They understand and agree to proceed with plan of care. [ x ] I have reviewed the History and Physical done within the last 30 days. Any changes noted above.     Debbie Hillman MD

## 2023-10-31 NOTE — DISCHARGE INSTRUCTIONS
Home Care Instructions Following Your Pain Procedure     Renard Donnelly,  It has been a pleasure to have you as our patient. To help you at home, you must follow these general discharge instructions. We will review these with you before you are discharged. It is our hope that you have a complete and uneventful recovery from our procedure. General Instructions:  What to Expect:  Bandages from your procedure today can be removed when you get home. Please avoid soaking and/or swimming for 24 hours. Showering is okay  It is normal to have increased pain symptoms and/or pain at injection site for up to 3-5 days after procedure, you can use heat or ice (20 minutes on 20 minutes off) for comfort. You may experience some temporary side effects which may include restlessness or insomnia, flushing of the face, or heart palpitations. Please contact the provider if these symptoms do not resolve within 3-4 days. Lightheadedness or nausea may occur and should resolve within 24 to 48 hours. If you develop a headache after treatment, rest, drink fluids (with caffeine, if possible) and take mild over-the-counter pain medication. If the headache does not improve with the above treatment, contact the physician. Home Medications:  Resume all previously prescribed medication. Please avoid taking NSAIDs (Non-Steriodal Anti-Inflammatory Drugs) such as:  Ibuprofen ( Advil, Motrin) Aleve (Naproxen), Diclofenac, Meloxicam for 6 hours after procedure. If you are on Coumadin (Warfarin) or any other anti-coagulant (or \"blood thinning\") medication such as Plavix (Clopidogrel), Xarelto (Rivaroxaban), Eliquis (Apixaban), Effient (Prasugrel) etc., restart on the following day from the procedure unless otherwise directed by your provider. If you are a diabetic, please increase the frequency of your glucose monitoring after the procedure as steroids may cause a temporary (2-3 day) increase in your blood sugar.   Contact your primary care physician if your blood sugar remains elevated as you may require some medication adjustment. Diet:  Resume your regular diet as tolerated. Activity: We recommend that you relax and rest during the rest of your procedure day. If you feel weakness in your arms or legs do not drive. Follow-up Appointment  Please schedule a follow-up visit within 3 to 4 weeks after your last procedure date. Question or Concerns:  Feel free to call our office with any questions or concerns at [phone] (option #2)    Criss Epps  Thank you for coming to [facility] for your procedure. The nurses try very hard to make sure you receive the best care possible. Your trust in them as well as us is greatly appreciated.     Thanks so much,   Dr. Vasquez

## 2023-10-31 NOTE — OPERATIVE REPORT
[facility]  Operative Report  10/31/2023     Rosemary Ely Patient Status:  Hospital Outpatient Surgery    1955 MRN [id number]   Location [address] [address] Attending Belinda Bentley MD   Hosp Day # 0 PCP Arvind Vizcaino DO     Indication: Rachelle Christensen is a 79year old female with a history of cervical spondylosis and neck    Preoperative Diagnosis:  Cervical spondylosis [M47.812]    Postoperative Diagnosis: Same as above. Procedure performed: CERVICAL FACET INJECTION left C5/6 and C6/7 with local    Anesthesia: Local     EBL: Less than 1 ml. Procedure Description:  After reviewing the patient's history and performing a focused physical examination, the diagnosis was confirmed and contraindications such as infection and coagulopathy were ruled out. Following review of allergy, potential side effects and complications, including but not necessarily limited to infection, allergic reaction, local tissue breakdown, nerve injury, and paresis, the patient indicated they understood and agreed to proceed. After obtaining the informed consent, the patient was brought to the procedure room and monitored. In the prone position, following sterile prep and drape of the cervical region, the articular pillars of the corresponding facet joints were identified under fluoroscopy. The skin and subcutaneous tissue were anesthetized via 25-gauge 1-1/2 inch needle with approximately 1 mL of 1% lidocaine. Under fluoroscopy, posterolateral approach was used to position a 22-gauge, 3-1/2-inch spinal needle adjacent to the mid portion of the corresponding articular pillar at C5. The needle position was confirmed in AP and lateral views. Following negative aspiration for CSF and blood, 0.5 mL 1% lidocaine and 5 mg of Decadron were injected. Similar procedures were performed at C6 levels. The needles were removed with tips intact. The patient tolerated the procedure very well.   No complications were encountered during or immediately after the procedure. The patient was observed until discharge criteria met. Discharge instructions were given and patient was released to a responsible adult. Complications: None.     Follow up: Milagros Romero MD

## 2023-11-01 ENCOUNTER — TELEPHONE (OUTPATIENT)
Dept: PAIN CLINIC | Facility: CLINIC | Age: 68
End: 2023-11-01

## 2023-11-01 NOTE — TELEPHONE ENCOUNTER
Wolf called placed to patient for post procedure follow up. Patient stated she had insomnia and felt some jitters. Advised this is normal post op and should flush out the steroid by drinking plenty of water. Pt understood. Pt will call back to schedule FU. Pt verbalized understanding to call with any questions or concerns.     Procedure:   CERVICAL FACET INJECTION left C5/6 and C6/7 with local       Date: 10/31/23  Follow up Visit Scheduled: NATHALY

## 2023-11-05 DIAGNOSIS — G43.701 CHRONIC MIGRAINE WITHOUT AURA WITH STATUS MIGRAINOSUS, NOT INTRACTABLE: ICD-10-CM

## 2023-11-06 RX ORDER — NARATRIPTAN 2.5 MG/1
2.5 TABLET ORAL AS NEEDED
Qty: 8 TABLET | Refills: 1 | Status: SHIPPED | OUTPATIENT
Start: 2023-11-06

## 2023-11-06 NOTE — TELEPHONE ENCOUNTER
Medication: NARATRIPTAN HCL 2.5 MG Oral Tab      Date of last refill: 09/13/2023 (#8/1)  [place]     Last office visit: 09/06/20023  Due back to clinic per last office note:  Around 11/29/2023  Date next office visit scheduled:    Future Appointments   Date Time Provider Siddhartha Aleman   11/21/2023  1:00 PM Julio Cesar Lozano MD [facility] [place]   12/4/2023  1:30 PM Cassi Dubon MD [facility] [place]   3/18/2024  1:00 PM RINA Mason [facility] [address]   4/5/2024 11:15 AM Janeth Marshall,  [facility] [place]           Last OV note recommendation:    ASSESSMENT/PLAN:      (G43.701) Chronic migraine without aura with status migrainosus, not intractable  (primary encounter diagnosis)  Plan: triamcinolone acetonide (Kenalog-40) 40 MG/ML         injection 40 mg, bupivacaine PF (Marcaine) 0.5%        injection, DISCONTINUED: Bupivacaine HCl         (MARCAINE) 0.5 % injection 20 mg         (M54.81) Occipital neuralgia of left side        (G43.019) Intractable migraine without aura and without status migrainosus        (M79.18) Myofascial pain     Left auriculotemporal and occipital nerve block We will also add trigger point injection  2. Continue Nurtec 75 mg as needed for acute migraine relief  Insurance didn't approved Nurtec for preventive therapy  Failed CRGP monthly injection and Botox therapy  3. Tizanidine as needed for neck stiffness  On amitriptyline from fibromyalgia and migraine prophylaxis        See orders and medications filed with this encounter. The patient indicates understanding of these issues and agrees with the plan.

## 2023-11-07 RX ORDER — ESCITALOPRAM OXALATE 5 MG/1
TABLET ORAL
COMMUNITY
Start: 2023-10-31

## 2023-11-13 ENCOUNTER — PATIENT MESSAGE (OUTPATIENT)
Dept: NEUROLOGY | Facility: CLINIC | Age: 68
End: 2023-11-13

## 2023-11-13 ENCOUNTER — PATIENT MESSAGE (OUTPATIENT)
Dept: FAMILY MEDICINE CLINIC | Facility: CLINIC | Age: 68
End: 2023-11-13

## 2023-11-13 NOTE — TELEPHONE ENCOUNTER
From: Ernesto Davenport  To: Leo Burton  Sent: 11/13/2023 10:11 AM CST  Subject: Migraine     Can I get in to get in office injection for migraine in left temple area.  Struggling since my procedure with Dr Ralph Sweeney

## 2023-11-14 NOTE — TELEPHONE ENCOUNTER
Patient states she has upper  left temporal pain that comes and goes. No reason why. This started once she received the injection from Dr. Josué Rocha. Per Dr. Roman Filter note on 11/06/2023 patient may be coming down with an infection but advised to follow up with Neurology. Patient states T&D injections do not work for her. She was asking about other injections that may help her head. She also asked if this is a result of her pain injection.

## 2023-11-14 NOTE — TELEPHONE ENCOUNTER
Pt called mg states she is experiencing  headache and is requesting call back from nursing for further assessment please advise [phone]

## 2023-11-15 ENCOUNTER — TELEPHONE (OUTPATIENT)
Dept: FAMILY MEDICINE CLINIC | Facility: CLINIC | Age: 68
End: 2023-11-15

## 2023-11-15 NOTE — TELEPHONE ENCOUNTER
Spoke to pt who's requesting to speak to a nurse.   Pt has a possible sinus infection and would like to be seen ASAP

## 2023-11-16 NOTE — TELEPHONE ENCOUNTER
Per Epic review, patient seen Dr Francy Mckinney yesterday (cardiology). Mention of migraines but nothing noted to call and speak with provider ASAP. Routed call as high priority to Dr. Aure Valencia for recommendations. Patient has tried and failed multple medications and others are not covered by insurance. Patient requesting Nerve block that was discussed prior. No PA on file for nerve block.

## 2023-11-16 NOTE — TELEPHONE ENCOUNTER
Detailed [facility] requesting return call with symptom detail. Advised no open appt yesterday or today. Can call in am and may have an opening with another provider in office. Otherwise can go to [facility] for evaluation. Instructed to push fluids, steam tx, antihistamine, flonase nasal spray.

## 2023-11-17 ENCOUNTER — TELEPHONE (OUTPATIENT)
Dept: NEUROLOGY | Facility: CLINIC | Age: 68
End: 2023-11-17

## 2023-11-17 DIAGNOSIS — M54.81 OCCIPITAL NEURALGIA OF LEFT SIDE: Primary | ICD-10-CM

## 2023-11-17 DIAGNOSIS — M79.18 MYOFASCIAL PAIN: ICD-10-CM

## 2023-11-17 NOTE — TELEPHONE ENCOUNTER
Received verbal order from Dr Christine Singh for patient to receive Occipital NBI and TPI. PA request for Occipital NBI routed to PA Dept.

## 2023-11-20 NOTE — TELEPHONE ENCOUNTER
Discussed with provider and pt can come to clinic at 12:40 for NBI only, no office visit. RN called patient and left message and [employer] message sent.

## 2023-11-20 NOTE — TELEPHONE ENCOUNTER
Called [facility] department [phone] and spoke with Vivian    CPT codes 54113,02575,51580 are valid and billable no authorization required for in office.     Call reference #[id number]

## 2023-11-23 NOTE — TELEPHONE ENCOUNTER
Medication: Tizanidine     Date of last refill: 5/1/17 for #60/0 additional refills  [place]    Last office visit: 5/1/17  Due back to clinic per last office note:  1-2 months  Date next office visit scheduled:  7/10 room air

## 2023-11-27 ENCOUNTER — PATIENT MESSAGE (OUTPATIENT)
Dept: NEUROLOGY | Facility: CLINIC | Age: 68
End: 2023-11-27

## 2023-11-27 ENCOUNTER — TELEMEDICINE (OUTPATIENT)
Dept: NEUROLOGY | Facility: CLINIC | Age: 68
End: 2023-11-27
Payer: MEDICARE

## 2023-11-27 DIAGNOSIS — G44.85 PRIMARY STABBING HEADACHE: Primary | ICD-10-CM

## 2023-11-27 DIAGNOSIS — G43.701 CHRONIC MIGRAINE WITHOUT AURA WITH STATUS MIGRAINOSUS, NOT INTRACTABLE: ICD-10-CM

## 2023-11-27 PROCEDURE — 99213 OFFICE O/P EST LOW 20 MIN: CPT | Performed by: OTHER

## 2023-11-27 RX ORDER — INDOMETHACIN 50 MG/1
50 CAPSULE ORAL DAILY
Qty: 30 CAPSULE | Refills: 0 | Status: SHIPPED | OUTPATIENT
Start: 2023-11-27

## 2023-11-27 RX ORDER — RIMEGEPANT SULFATE 75 MG/75MG
75 TABLET, ORALLY DISINTEGRATING ORAL EVERY OTHER DAY
Qty: 16 TABLET | Refills: 5 | Status: SHIPPED | OUTPATIENT
Start: 2023-11-27

## 2023-11-27 NOTE — PROGRESS NOTES
HPI:    Patient ID: Deborah Skinner is a 79year old female. This visit is conducted using Telemedicine with live, interactive video and audio. Patient has been referred to the [facility] website at [email or web address] to review the yearly Consent to Treat document. Patient understands and accepts financial responsibility for any deductible, co-insurance and/or co-pays associated with this service. Patient is a 79year old female with history of chronic migraines. States she has been having new \"ice pick\" headaches in left temporal region and started after getting left cervical C5/6 and C6/7 facet joint injection. States the left temple sharp pain lasts on and off throughout the day. She is on Nurtec for migraines but insurance didn't approved it so not on any abortive medication      HISTORY:  Past Medical History:   Diagnosis Date    Abnormal Holter monitor finding 10/20/2005    rare PAC's and PVC's    Abnormal MRI, cervical spine 02/05/2008    uncovertebral degenerative change is suggested, right-sided C2 C3, and left sided C4 C5 with disc bulging noted also at the C4 C5 level.  small central protrusions effacing the subarachnoid space C5 C6 and C6 C7    Chest pain 02/2002    with exercises    Chronic fatigue syndrome     Colitis 04/2006    sigmoid erythema consistent with nonspecific colitis    Decreased sexual desire 02/1998    Depression with anxiety     psychiatry: Dr. Fannie Mendoza    Diverticulosis 04/2006    scattered, minimal    Endometriosis     s/p ablation therapy    Fibromyalgia     History of colon polyps     GI: Dr. Bethanie Reina    History of femur fracture 04/18/2012    s/p closed reduction percutaneous screw fixation of right femoral neck fracture  internal fixation of the right hip    HTN (hypertension)     Hyperlipidemia     IBD (inflammatory bowel disease)     IBS (irritable bowel syndrome) 03/17/2006    chronic, recurrent.  GI: Dr. Bethanie Reina    Intervertebral disc protrusion 04/06/2005    L5/S1 small central protrusion effacing the thecal sax with a small annular fissure in the posterior annular fibers    Migraines     neuro: Dr. Tiffanie Alonso    Nephrolithiasis     Osteopenia 05/31/2012    Palpitations 02/1998    likely anxiety related to dystrophic nails secondary to mechanical injury     Postmenopausal HRT (hormone replacement therapy)     Seasonal allergies     Vitamin D deficiency 03/04/2019      Past Surgical History:   Procedure Laterality Date    CHOLECYSTECTOMY  11/10    bile leak, sepsis, pancreatitis    COLONOSCOPY  10/16, 4/06, 9/00, 8/97 2000 polyps, 2006 no polyps    COLONOSCOPY  11/2019    COLONOSCOPY N/A 11/14/2019    Procedure: COLONOSCOPY, POSSIBLE BIOPSY, POSSIBLE POLYPECTOMY 18470;  Surgeon: El Ernandez MD;  Location: [address] [place] [address]    EGD SCOPE  2000, 3/18    normal, empiric dilation    ENDOMETR ABLATE, THERMAL  2000    ERCP,DIAGNOSTIC  2/11    LAPAROSCOPY,LYSIS ADHESNS      endometirosis    LEG/ANKLE SURGERY PROC UNLISTED  4/18/2012    closed reduction percutaneous screw fixation of right femoral neck fracture  internal fixation of the right hip    OTHER  April 2016    Cervical Facet injection, Ganglion block    REMOVAL GALLBLADDER      TUBAL LIGATION        Family History   Problem Relation Age of Onset    Lung Disorder Mother         COPD, emphysema    Colon Cancer Maternal Grandmother         colon    Cancer Other         ovarian and breast    Breast Cancer Paternal Aunt 20      Social History     Socioeconomic History    Marital status:     Number of children: 0   Occupational History    Occupation: on disability, former    Tobacco Use    Smoking status: Never    Smokeless tobacco: Never    Tobacco comments:     20 yrs of second hand smoke, mother smoked   Vaping Use    Vaping Use: Never used   Substance and Sexual Activity    Alcohol use:  Yes     Alcohol/week: 0.0 - 1.0 standard drinks of alcohol Comment: occasional    Drug use: Never     Comment: salve    Sexual activity: Not Currently     Partners: Male     Birth control/protection: Tubal Ligation     Comment: , together since 1994   Other Topics Concern    Caffeine Concern Yes     Comment: 1-1.5 cup of coffee in am    Exercise Yes     Comment: walking            Review of Systems   Constitutional: Negative. HENT: Negative. Eyes: Negative. Respiratory: Negative. Cardiovascular: Negative. Gastrointestinal: Negative. Endocrine: Negative. Genitourinary: Negative. Musculoskeletal:  Negative for neck stiffness. Neurological:  Positive for headaches. All other systems reviewed and are negative. Current Outpatient Medications   Medication Sig Dispense Refill    Evolocumab (REPATHA SURECLICK) 612 MG/ML Subcutaneous Solution Auto-injector Repatha SureClick 339 mg/mL subcutaneous pen injector, [RxNorm: 1429157]      ALPRAZolam 0.5 MG Oral Tab Take 0.5-1 tablets (0.25-0.5 mg total) by mouth daily as needed for Anxiety. 30 tablet 2    amitriptyline 75 MG Oral Tab Take 1 tablet (75 mg total) by mouth nightly. 30 tablet 2    clonazePAM 0.5 MG Oral Tab Take 1.5-2 tablets (0.75-1 mg total) by mouth nightly as needed for Anxiety. 60 tablet 2    escitalopram 5 MG Oral Tab       Naratriptan HCl 2.5 MG Oral Tab Take 1 tablet (2.5 mg total) by mouth as needed. 8 tablet 1    tiZANidine 2 MG Oral Tab Take 1 tablet (2 mg total) by mouth 2 (two) times daily as needed. 60 tablet 2    Cholecalciferol (VITAMIN D3) 250 MCG (29067 UT) Oral Cap Take 1 capsule by mouth daily. 3000 international unit      amLODIPine 5 MG Oral Tab Take 1 tablet (5 mg total) by mouth daily. LINZESS 72 MCG Oral Cap every other day. (Patient not taking: Reported on 9/20/2023)      docusate sodium (DULCOLAX PINK STOOL SOFTENER) 100 MG Oral Cap Take 1 capsule (100 mg total) by mouth 2 (two) times daily.  30 capsule 0    dicyclomine 20 MG Oral Tab Take 1 tablet (20 mg total) by mouth 4 (four) times daily. estradiol 1 MG Oral Tab       progesterone 100 MG Oral Cap       albuterol (PROAIR HFA) 108 (90 Base) MCG/ACT Inhalation Aero Soln Inhale 2 puffs into the lungs every 6 (six) hours as needed for Wheezing. 18 g 1    Esomeprazole Magnesium 20 MG Oral Capsule Delayed Release Take 2 capsules (40 mg total) by mouth nightly. Ibuprofen 200 MG Oral Cap Take by mouth. MULTIVITAMIN TAB/CAP Take 1 tablet by mouth daily. Allergies: Allergies   Allergen Reactions    Codeine ITCHING    Morphine NAUSEA AND VOMITING and ITCHING    Sulfa Antibiotics ITCHING    Statins MYALGIA    Alc-Benzyl Alc-Sulfamethoxazole-Trimethoprim RASH    Amoxicillin-Pot Clavulanate      Other reaction(s): rash    Bactrim [Sulfamethoxazole W/Trimethoprim] ITCHING    Cefdinir DIARRHEA     Abdominal pain    Cefdinir OTHER (SEE COMMENTS)    Ciprofloxacin ITCHING    Citric Acid OTHER (SEE COMMENTS)    Clarithromycin NAUSEA ONLY     Nausea and abdominal cramping    Dairy Products OTHER (SEE COMMENTS)    Dust     Hydrocodone ITCHING    Lactose OTHER (SEE COMMENTS)    Pain Relief [Goodys Extra Strength] ITCHING     Any strong pain med, Norco, Oxycodone, etc.    Peppermint Flavor     Reglan [Metoclopramide] PALPITATIONS and OTHER (SEE COMMENTS)     Hypertension with Reglan administration via IV    Seasonal     Tomato OTHER (SEE COMMENTS)    Zomig [Zolmitriptan] ITCHING      PHYSICAL EXAM:   Physical Exam       Vitals not done    General: A pleasant 79year old female in no apparent distress  HEENT: Normocephalic and atraumatic. Cardiovascular: Not done  Pulmonary/Chest: Effort normal  Abdominal: not done  Psychiatric:normal mood and affect. NEUROLOGICAL: This patient is alert and orientated x 3. Speech is fluent with intact comprehension. CN: Pupils equally round and reactive to light. 3+ brisk bilaterally. EOMs intact. Visual fields are full. Face is symmetrical. Tongue is midline.  Uvula and palate elevate symmetrically. Shrug shoulders normally bilaterally. The rest of the cranial nerves are grossly intact. Sensation to light touch is intact bilaterally. Motor: Normal strength  Gait: deferred  ASSESSMENT/PLAN:         ICD-10-CM    1. Primary stabbing headache  G44.85       2. Chronic migraine without aura with status migrainosus, not intractable  G43.701           Left temporal ice pick headaches probable primary stabbing headache  History of left occipital neuralgia and cervicalgia  Chronic migraines        Trial of Indocin 50 mg daily for primary stabbing headache    Continue Nurtec 75 mg as needed for acute migraine relief- needs pA  Insurance didn't approved Nurtec for preventive therapy  Failed CRGP monthly injection and Botox therapy    On amitriptyline from fibromyalgia and migraine prophylaxis    Total 20 minutes spent on this encounter    See orders and medications filed with this encounter. The patient indicates understanding of these issues and agrees with the plan. No orders of the defined types were placed in this encounter.       Meds This Visit:  Requested Prescriptions      No prescriptions requested or ordered in this encounter       Imaging & Referrals:  None       ID#[id number]  HPI

## 2023-11-28 ENCOUNTER — TELEPHONE (OUTPATIENT)
Dept: NEUROLOGY | Facility: CLINIC | Age: 68
End: 2023-11-28

## 2023-11-29 NOTE — TELEPHONE ENCOUNTER
Indomethacin 50mg approved 8/31/2023-11/29/2024. Approval letter faxed to [place] with fax confirmation received.

## 2023-12-06 ENCOUNTER — PATIENT MESSAGE (OUTPATIENT)
Dept: NEUROLOGY | Facility: CLINIC | Age: 68
End: 2023-12-06

## 2023-12-06 DIAGNOSIS — G43.701 CHRONIC MIGRAINE WITHOUT AURA WITH STATUS MIGRAINOSUS, NOT INTRACTABLE: Primary | ICD-10-CM

## 2023-12-06 RX ORDER — METHYLPREDNISOLONE 4 MG/1
TABLET ORAL
Qty: 1 EACH | Refills: 0 | Status: SHIPPED | OUTPATIENT
Start: 2023-12-06

## 2023-12-06 NOTE — TELEPHONE ENCOUNTER
From: Treasure Workman  To: Leo Burton  Sent: 12/6/2023 12:49 PM CST  Subject: New prescription     I was not able to  the new medication because the pharmacy flagged it for allergies.    Can I get a steroid pack   Zak Alaniz

## 2023-12-13 NOTE — TELEPHONE ENCOUNTER
Called [facility] Medicare Advantage plan [id number] and spoke with [facility]. Codes 48417,82206,43595,61937 are valid and billable no authorization is required. Call reference #[id number].  Time on call 11:17
See alternate TE 8/3/20
Spoke with handy Suarez to squeeze in Friday at 15. Informed pt. Verbalized understanding and accepted appt. Called pt back and LMTCB to go over travel screening.
Spoke with pt, informed her that we are still awaiting PA outcome on NBI/TPI (she receives both). States she did get an adjustment with her chiropractor without major success.  Still continues to have \"pinched nerve\" electrical sensation on left side f
Continue Regimen: azelaic acid 15 % topical gel, Apply BID to face
Detail Level: Simple
Render In Strict Bullet Format?: No

## 2023-12-14 ENCOUNTER — PATIENT MESSAGE (OUTPATIENT)
Dept: NEUROLOGY | Facility: CLINIC | Age: 68
End: 2023-12-14

## 2023-12-14 NOTE — TELEPHONE ENCOUNTER
From: Jan Mckee  To: Leo Micheal  Sent: 12/14/2023 3:39 PM CST  Subject: Migraine     I am still experiencing daily headaches, even with the steroid pack, the headaches seem to be behind the eyes with a stabbing pain in the left Occipital area. I did go to my eye doctor and they checked my eyes out and suggested computer glasses. But they said the headache are not something with my eyesight. These headaches seem different than the ones that I have gotten in the past and they come and go. Please let me know what we should do.

## 2023-12-14 NOTE — TELEPHONE ENCOUNTER
Pt reporting continued migraine. Pt approved for Nerve block on 11/20/2023 - on 11/2/7/2023 pt informed staff she wanted video visit as she \"really didn't want to get the nerve block    11/27/2023 Televisit -    Continue Nurtec  Trial of 50 mg Indocin - pt sent message on 12/6/2023 flagged for allergies? - requested medrol dose yudith    Routed to provider.

## 2023-12-15 DIAGNOSIS — E78.2 MIXED HYPERLIPIDEMIA: ICD-10-CM

## 2023-12-18 RX ORDER — EZETIMIBE 10 MG/1
10 TABLET ORAL NIGHTLY
Qty: 30 TABLET | Refills: 2 | OUTPATIENT
Start: 2023-12-18

## 2023-12-21 ENCOUNTER — OFFICE VISIT (OUTPATIENT)
Dept: FAMILY MEDICINE CLINIC | Facility: CLINIC | Age: 68
End: 2023-12-21
Payer: MEDICARE

## 2023-12-21 VITALS
DIASTOLIC BLOOD PRESSURE: 66 MMHG | RESPIRATION RATE: 16 BRPM | TEMPERATURE: 98 F | SYSTOLIC BLOOD PRESSURE: 126 MMHG | HEART RATE: 103 BPM | OXYGEN SATURATION: 95 %

## 2023-12-21 DIAGNOSIS — J06.9 VIRAL UPPER RESPIRATORY ILLNESS: Primary | ICD-10-CM

## 2023-12-21 PROCEDURE — 99213 OFFICE O/P EST LOW 20 MIN: CPT | Performed by: NURSE PRACTITIONER

## 2023-12-21 PROCEDURE — 87635 SARS-COV-2 COVID-19 AMP PRB: CPT | Performed by: NURSE PRACTITIONER

## 2023-12-21 PROCEDURE — 3078F DIAST BP <80 MM HG: CPT | Performed by: NURSE PRACTITIONER

## 2023-12-21 PROCEDURE — 3074F SYST BP LT 130 MM HG: CPT | Performed by: NURSE PRACTITIONER

## 2023-12-21 PROCEDURE — 1159F MED LIST DOCD IN RCRD: CPT | Performed by: NURSE PRACTITIONER

## 2023-12-21 PROCEDURE — 1160F RVW MEDS BY RX/DR IN RCRD: CPT | Performed by: NURSE PRACTITIONER

## 2023-12-22 LAB — SARS-COV-2 RNA RESP QL NAA+PROBE: NOT DETECTED

## 2024-01-02 RX ORDER — ESTRADIOL 1 MG/1
TABLET ORAL
Refills: 0 | OUTPATIENT
Start: 2024-01-02

## 2024-01-02 NOTE — TELEPHONE ENCOUNTER
LOV: 9/20/23 telemedicine  Next OV: none stated  Last Refill:historical  Medication Quantity Refills Start End   estradiol 1 MG Oral Tab -- -- 10/10/2022 --   Route:   (none)     Class:   Historical       Gynecology Medication Protocol [id number]/30/2023 12:37 PM    Mammogram in past 12 months    PASS--PENDING LAST PAP WNL--VIA MANUAL LOOKUP    Physical or Pelvic/Breast in past 12 or next 3 mos--VIA MANUAL LOOKUP       Please authorize if acceptable.   Thank you!

## 2024-01-16 ENCOUNTER — TELEPHONE (OUTPATIENT)
Dept: NEUROLOGY | Facility: CLINIC | Age: 69
End: 2024-01-16

## 2024-01-16 DIAGNOSIS — G43.701 CHRONIC MIGRAINE WITHOUT AURA WITH STATUS MIGRAINOSUS, NOT INTRACTABLE: ICD-10-CM

## 2024-01-19 NOTE — TELEPHONE ENCOUNTER
From: Kamila ROSE  To: Carl Alaniz  Sent: 1/16/2024 1:42 PM CST  Subject: Nurtec    Dominik Crespo,    We received a fax today from "[employer] requesting a prior authorization be done for the Nurtec every other day. A question I need to answer is whether you have had a decreased # of migraine days since you started taking Nurtec every other day. Have you?      Sincerely,    Domonique, RN  Office Nurse    [address], [place] [address]  [phone]  [email or web address]

## 2024-01-22 RX ORDER — NARATRIPTAN 2.5 MG/1
2.5 TABLET ORAL AS NEEDED
Qty: 8 TABLET | Refills: 1 | OUTPATIENT
Start: 2024-01-22

## 2024-01-22 NOTE — TELEPHONE ENCOUNTER
Received fax from [employer].  Approval from 01/01/2024-12/31/2024  Medication Nurtec  Case number [id number]  Faxed approval to dispensing pharmacy  Received fax confirmation

## 2024-01-24 ENCOUNTER — APPOINTMENT (OUTPATIENT)
Dept: CT IMAGING | Age: 69
End: 2024-01-24
Attending: EMERGENCY MEDICINE
Payer: MEDICARE

## 2024-01-24 ENCOUNTER — TELEPHONE (OUTPATIENT)
Dept: FAMILY MEDICINE CLINIC | Facility: CLINIC | Age: 69
End: 2024-01-24

## 2024-01-24 ENCOUNTER — HOSPITAL ENCOUNTER (EMERGENCY)
Age: 69
Discharge: HOME | End: 2024-01-24
Attending: EMERGENCY MEDICINE
Payer: MEDICARE

## 2024-01-24 VITALS
OXYGEN SATURATION: 96 % | BODY MASS INDEX: 28.13 KG/M2 | HEIGHT: 66 IN | DIASTOLIC BLOOD PRESSURE: 77 MMHG | SYSTOLIC BLOOD PRESSURE: 134 MMHG | TEMPERATURE: 98 F | HEART RATE: 81 BPM | RESPIRATION RATE: 16 BRPM | WEIGHT: 175 LBS

## 2024-01-24 DIAGNOSIS — G43.809 OTHER MIGRAINE WITHOUT STATUS MIGRAINOSUS, NOT INTRACTABLE: Primary | ICD-10-CM

## 2024-01-24 LAB
ALBUMIN SERPL-MCNC: 3.6 G/DL (ref 3.4–5)
ALBUMIN/GLOB SERPL: 1 {RATIO} (ref 1–2)
ALP LIVER SERPL-CCNC: 33 U/L
ANION GAP SERPL CALC-SCNC: 6 MMOL/L (ref 0–18)
AST SERPL-CCNC: 18 U/L (ref 15–37)
BASOPHILS # BLD AUTO: 0.07 X10(3) UL (ref 0–0.2)
BASOPHILS NFR BLD AUTO: 0.9 %
BILIRUB SERPL-MCNC: 0.4 MG/DL (ref 0.1–2)
BUN BLD-MCNC: 17 MG/DL (ref 9–23)
CALCIUM BLD-MCNC: 9.4 MG/DL (ref 8.5–10.1)
CHLORIDE SERPL-SCNC: 106 MMOL/L (ref 98–112)
CO2 SERPL-SCNC: 26 MMOL/L (ref 21–32)
CREAT BLD-MCNC: 1.02 MG/DL
EGFRCR SERPLBLD CKD-EPI 2021: 61 ML/MIN/1.73M2 (ref 60–?)
EOSINOPHIL # BLD AUTO: 0.16 X10(3) UL (ref 0–0.7)
EOSINOPHIL NFR BLD AUTO: 2 %
ERYTHROCYTE [DISTWIDTH] IN BLOOD BY AUTOMATED COUNT: 13.2 %
ERYTHROCYTE [SEDIMENTATION RATE] IN BLOOD: 7 MM/HR
GLOBULIN PLAS-MCNC: 3.6 G/DL (ref 2.8–4.4)
GLUCOSE BLD-MCNC: 99 MG/DL (ref 70–99)
HCT VFR BLD AUTO: 42.5 %
HGB BLD-MCNC: 14 G/DL
IMM GRANULOCYTES # BLD AUTO: 0.02 X10(3) UL (ref 0–1)
IMM GRANULOCYTES NFR BLD: 0.2 %
LYMPHOCYTES # BLD AUTO: 2.83 X10(3) UL (ref 1–4)
LYMPHOCYTES NFR BLD AUTO: 34.8 %
MCH RBC QN AUTO: 28.8 PG (ref 26–34)
MCHC RBC AUTO-ENTMCNC: 32.9 G/DL (ref 31–37)
MCV RBC AUTO: 87.4 FL
MONOCYTES # BLD AUTO: 0.55 X10(3) UL (ref 0.1–1)
MONOCYTES NFR BLD AUTO: 6.8 %
NEUTROPHILS # BLD AUTO: 4.5 X10 (3) UL (ref 1.5–7.7)
NEUTROPHILS # BLD AUTO: 4.5 X10(3) UL (ref 1.5–7.7)
NEUTROPHILS NFR BLD AUTO: 55.3 %
OSMOLALITY SERPL CALC.SUM OF ELEC: 288 MOSM/KG (ref 275–295)
PLATELET # BLD AUTO: 306 10(3)UL (ref 150–450)
POTASSIUM SERPL-SCNC: 3.8 MMOL/L (ref 3.5–5.1)
PROT SERPL-MCNC: 7.2 G/DL (ref 6.4–8.2)
RBC # BLD AUTO: 4.86 X10(6)UL
SODIUM SERPL-SCNC: 138 MMOL/L (ref 136–145)
WBC # BLD AUTO: 8.1 X10(3) UL (ref 4–11)

## 2024-01-24 PROCEDURE — 70498 CT ANGIOGRAPHY NECK: CPT | Performed by: EMERGENCY MEDICINE

## 2024-01-24 PROCEDURE — 70496 CT ANGIOGRAPHY HEAD: CPT | Performed by: EMERGENCY MEDICINE

## 2024-01-24 PROCEDURE — 99285 EMERGENCY DEPT VISIT HI MDM: CPT

## 2024-01-24 PROCEDURE — 96374 THER/PROPH/DIAG INJ IV PUSH: CPT

## 2024-01-24 PROCEDURE — 80053 COMPREHEN METABOLIC PANEL: CPT | Performed by: EMERGENCY MEDICINE

## 2024-01-24 PROCEDURE — 85025 COMPLETE CBC W/AUTO DIFF WBC: CPT | Performed by: EMERGENCY MEDICINE

## 2024-01-24 PROCEDURE — 96361 HYDRATE IV INFUSION ADD-ON: CPT

## 2024-01-24 PROCEDURE — 96375 TX/PRO/DX INJ NEW DRUG ADDON: CPT

## 2024-01-24 PROCEDURE — 99284 EMERGENCY DEPT VISIT MOD MDM: CPT

## 2024-01-24 PROCEDURE — 85652 RBC SED RATE AUTOMATED: CPT | Performed by: EMERGENCY MEDICINE

## 2024-01-24 RX ORDER — DIPHENHYDRAMINE HYDROCHLORIDE 50 MG/ML
50 INJECTION INTRAMUSCULAR; INTRAVENOUS ONCE
Status: COMPLETED | OUTPATIENT
Start: 2024-01-24 | End: 2024-01-24

## 2024-01-24 RX ORDER — KETOROLAC TROMETHAMINE 30 MG/ML
30 INJECTION, SOLUTION INTRAMUSCULAR; INTRAVENOUS ONCE
Status: COMPLETED | OUTPATIENT
Start: 2024-01-24 | End: 2024-01-24

## 2024-01-24 RX ORDER — ONDANSETRON 2 MG/ML
4 INJECTION INTRAMUSCULAR; INTRAVENOUS ONCE
Status: COMPLETED | OUTPATIENT
Start: 2024-01-24 | End: 2024-01-24

## 2024-01-24 RX ORDER — IOHEXOL 350 MG/ML
65 INJECTION, SOLUTION INTRAVENOUS
Status: COMPLETED | OUTPATIENT
Start: 2024-01-24 | End: 2024-01-24

## 2024-01-24 NOTE — TELEPHONE ENCOUNTER
Spoke to patient who states she was seen at [facility] on Friday for a sinus infection.  Prescribed doxycycline.  Patient states after taking the first dose she started having acute eye pressure and HA and a little stiff neck.  Doesn't feel like her typical migraine.  She has also developed visual changes with blurred vision. Has continued the doxycycline.  States sinus symptoms are somewhat improved.  Denies fever. States the eye pressure/head pressure are almost unbearable and she is very worried about her vision.  Patient has multiple allergies including several antibiotics. Instructed to STOP doxycycline.  Advised to take an antihistamine like benadryl, claritin, allegra or zyrtec and go to IC/ED for urgent evaluation.  Advised after being seen, call back if she needs a follow up appt. Verbalizes understanding.

## 2024-01-24 NOTE — TELEPHONE ENCOUNTER
Spoke to pt who's experiencing extreme eye and head pressure and requesting to speak to a nurse, gotten worse when pt start taking antibiotic.

## 2024-01-24 NOTE — DISCHARGE INSTRUCTIONS
Take 1000 mg acetaminophen (2 Tylenol tablets) and/or 600 mg ibuprofen (3 Advil tablets) every 6 hours as needed for pain or fever.    Drink plenty of fluids - especially low-calorie sports drinks like Gatorade.

## 2024-01-24 NOTE — ED PROVIDER NOTES
Patient Seen in: [facility] [place]      History     Chief Complaint   Patient presents with    Headache     Stated Complaint: head pressure and eye pain    Subjective:   HPI    68-year-old man who comes in with severe headache.  She has a history of migraines and feels that this is quite different.  She describes a sensation stabbing sensation more on the left side of her head.  She had some blurred vision earlier and that since cleared.  No fever.  She has nausea no vomiting.  She just tried a new migraine medication called Nurtec.    Objective:   Past Medical History:   Diagnosis Date    Abnormal Holter monitor finding 10/20/2005    rare PAC's and PVC's    Abnormal MRI, cervical spine 02/05/2008    uncovertebral degenerative change is suggested, right-sided C2 C3, and left sided C4 C5 with disc bulging noted also at the C4 C5 level.  small central protrusions effacing the subarachnoid space C5 C6 and C6 C7    Chest pain 02/2002    with exercises    Chronic fatigue syndrome     Colitis 04/2006    sigmoid erythema consistent with nonspecific colitis    Decreased sexual desire 02/1998    Depression with anxiety     psychiatry: Dr. Iva Nguyen    Diverticulosis 04/2006    scattered, minimal    Endometriosis     s/p ablation therapy    Fibromyalgia     History of colon polyps     GI: Dr. Abisai Bay    History of femur fracture 04/18/2012    s/p closed reduction percutaneous screw fixation of right femoral neck fracture  internal fixation of the right hip    HTN (hypertension)     Hyperlipidemia     IBD (inflammatory bowel disease)     IBS (irritable bowel syndrome) 03/17/2006    chronic, recurrent. GI: Dr. Abisai Bay    Intervertebral disc protrusion 04/06/2005    L5/S1 small central protrusion effacing the thecal sax with a small annular fissure in the posterior annular fibers    Migraines     neuro: Dr. Leo Burton    Nephrolithiasis     Osteopenia 05/31/2012    Palpitations  02/1998    likely anxiety related to dystrophic nails secondary to mechanical injury     Postmenopausal HRT (hormone replacement therapy)     Seasonal allergies     Vitamin D deficiency 03/04/2019              Past Surgical History:   Procedure Laterality Date    CHOLECYSTECTOMY  11/10    bile leak, sepsis, pancreatitis    COLONOSCOPY  10/16, 4/06, 9/00, 8/97 2000 polyps, 2006 no polyps    COLONOSCOPY  11/2019    COLONOSCOPY N/A 11/14/2019    Procedure: COLONOSCOPY, POSSIBLE BIOPSY, POSSIBLE POLYPECTOMY 60718;  Surgeon: Abisai Bay MD;  Location: [facility]    EGD SCOPE  2000, 3/18    normal, empiric dilation    ENDOMETR ABLATE, THERMAL  2000    ERCP,DIAGNOSTIC  2/11    LAPAROSCOPY,LYSIS ADHESNS      endometirosis    LEG/ANKLE SURGERY PROC UNLISTED  4/18/2012    closed reduction percutaneous screw fixation of right femoral neck fracture  internal fixation of the right hip    OTHER  April 2016    Cervical Facet injection, Ganglion block    REMOVAL GALLBLADDER      TUBAL LIGATION                  Social History     Socioeconomic History    Marital status:     Number of children: 0   Occupational History    Occupation: on disability, former    Tobacco Use    Smoking status: Never    Smokeless tobacco: Never    Tobacco comments:     20 yrs of second hand smoke, mother smoked   Vaping Use    Vaping Use: Never used   Substance and Sexual Activity    Alcohol use: Yes     Alcohol/week: 0.0 - 1.0 standard drinks of alcohol     Comment: occasional    Drug use: Never     Comment: salve    Sexual activity: Not Currently     Partners: Male     Birth control/protection: Tubal Ligation     Comment: , together since 1994   Other Topics Concern    Caffeine Concern Yes     Comment: 1-1.5 cup of coffee in am    Exercise Yes     Comment: walking              Review of Systems    Positive for stated complaint: head pressure and eye pain  Other systems are as noted in  HPI.  Constitutional and vital signs reviewed.      All other systems reviewed and negative except as noted above.    Physical Exam     ED Triage Vitals [01/24/24 1108]   /76   Pulse 113   Resp 20   Temp 97.8 °F (36.6 °C)   Temp src Temporal   SpO2 97 %   O2 Device None (Room air)       Current:/77   Pulse 81   Temp 97.8 °F (36.6 °C) (Temporal)   Resp 16   Ht 167.6 cm (5' 6\")   Wt 79.4 kg   SpO2 96%   BMI 28.25 kg/m²         Physical Exam    General:  Vitals as listed.  No acute distress   HEENT: Sclerae anicteric.  Conjunctivae show no pallor.  Oropharynx clear, mucous membranes moist   Lungs: good air exchange and clear   Heart: regular rate rhythm and no murmur   Extremities: no edema, normal peripheral pulses   Neuro: Alert oriented and nonfocal    ED Course     Labs Reviewed   COMP METABOLIC PANEL (14) - Abnormal; Notable for the following components:       Result Value    Alkaline Phosphatase 33 (*)     All other components within normal limits   SED RATE, WESTERGREN (AUTOMATED) - Normal   CBC WITH DIFFERENTIAL WITH PLATELET    Narrative:     The following orders were created for panel order CBC With Differential With Platelet.  Procedure                               Abnormality         Status                     ---------                               -----------         ------                     CBC W/ DIFFERENTIAL[[id number]]                              Final result                 Please view results for these tests on the individual orders.   CBC W/ DIFFERENTIAL     CTA BRAIN + CTA CAROTIDS (CPT=70496/25907)    Result Date: 1/24/2024  CONCLUSION:   1.  No evidence of acute intracranial process.   2. Unremarkable CTA of the [id number].  3. There is some irregularity to the vessel wall involving the left distal internal carotid artery which is mildly \"lumpy/bumpy\" which can be seen in fibromuscular dysplasia.   LOCATION:  [facility]   Dictated by (CST): Josh Rhoades MD on 1/24/2024  at 1:44 PM     Finalized by (CST): Josh Rhoades MD on 1/24/2024 at 1:59 PM          I independently read the head CT scan and no intracranial bleed appreciated    MDM     Sources of the medical history included the patient and her  who accompanies her    Differential diagnosis before testing included peripheral vertigo versus cerebellar bleed, a medical condition that poses a threat to life.    I reviewed prior external notes including head CT scan on 8/15/2016 that was normal    I am not sure about the finding on the CTA of the brain/neck consistent with one of the vessels showing fibromuscular dysplasia.  I discussed with the patient.  She will discuss it interned with neurologist.  This may be a incidental finding that has nothing to do with her symptoms.    Given Toradol, Zofran, and Benadryl for symptomatic relief.  Hydrated with normal saline.    At 2:20 PM, the patient feels much better and wants to be discharged home.    I have discussed with the patient the results of testing, differential diagnosis, and treatment plan. They expressed clear understanding of these instructions and agrees to the plan provided.    Disposition and Plan     Clinical Impression:  1. Other migraine without status migrainosus, not intractable         Disposition:  Discharge  1/24/2024  2:20 pm    Follow-up:  Michael Quarles MD  [address] [facility] [address]  [phone]    Schedule an appointment as soon as possible for a visit in 1 week(s)            Medications Prescribed:  Current Discharge Medication List

## 2024-01-24 NOTE — ED INITIAL ASSESSMENT (HPI)
Reports uri and sinus pressure for past several weeks.  Was instructed to take otc meds but has had no relief.  Was then started on doxy about 1/19.  States no relief.  Pt continues with pressure behind eyes and pressure in head.  Last med for pain was last noc.  Denies fever

## 2024-01-25 ENCOUNTER — OFFICE VISIT (OUTPATIENT)
Dept: NEUROLOGY | Facility: CLINIC | Age: 69
End: 2024-01-25
Payer: MEDICARE

## 2024-01-25 VITALS
RESPIRATION RATE: 16 BRPM | BODY MASS INDEX: 29 KG/M2 | HEART RATE: 80 BPM | SYSTOLIC BLOOD PRESSURE: 120 MMHG | WEIGHT: 182 LBS | DIASTOLIC BLOOD PRESSURE: 74 MMHG

## 2024-01-25 DIAGNOSIS — H53.8 VISUAL BLURRINESS: ICD-10-CM

## 2024-01-25 DIAGNOSIS — G43.709 CHRONIC MIGRAINE W/O AURA W/O STATUS MIGRAINOSUS, NOT INTRACTABLE: Primary | ICD-10-CM

## 2024-01-25 DIAGNOSIS — G44.85 PRIMARY STABBING HEADACHE: ICD-10-CM

## 2024-01-25 PROCEDURE — 99215 OFFICE O/P EST HI 40 MIN: CPT | Performed by: OTHER

## 2024-01-25 PROCEDURE — 3074F SYST BP LT 130 MM HG: CPT | Performed by: OTHER

## 2024-01-25 PROCEDURE — 3078F DIAST BP <80 MM HG: CPT | Performed by: OTHER

## 2024-01-25 RX ORDER — VENLAFAXINE HYDROCHLORIDE 37.5 MG/1
37.5 CAPSULE, EXTENDED RELEASE ORAL DAILY
Qty: 30 CAPSULE | Refills: 2 | Status: SHIPPED | OUTPATIENT
Start: 2024-01-25 | End: 2024-01-25

## 2024-01-25 RX ORDER — VENLAFAXINE HYDROCHLORIDE 37.5 MG/1
37.5 CAPSULE, EXTENDED RELEASE ORAL DAILY
Qty: 30 CAPSULE | Refills: 2 | Status: SHIPPED | OUTPATIENT
Start: 2024-01-25

## 2024-01-25 NOTE — PROGRESS NOTES
Patient reports she was seen in Urgent Care for a sinus infection and they put her on sulfa and she developed headaches and blurred vision where she could not see clearly. Suggested computer glasses by eye doctor. Is on Amitriptyline and Naratriptan.

## 2024-01-25 NOTE — PATIENT INSTRUCTIONS
Venlafaxine 37.5 mg ER         2. Ophthalmology consult- [facility]      3.   Wean off estrogen             After your visit at the [place] office  today, please direct any follow up questions or medication needs to the staff in our [place] office so that your concerns may be promptly addressed.  We are available through [employer] or at the numbers below:    The phone number is:   [phone], option 1    The fax number is:  [phone]    Your pharmacy should also send any requests electronically to the [place] office.       Refill policies:    Allow 2-3 business days for refills; controlled substances may take longer.  Contact your pharmacy at least 5 days prior to running out of medication and have them send an electronic request or submit request through the “request refill” option in your [employer] account.  Refills are not addressed on weekends; covering physicians do not authorize routine medications on weekends.  No narcotics or controlled substances are refilled after noon on Fridays or by on call physicians.  By law, narcotics must be electronically prescribed.  A 30 day supply with no refills is the maximum allowed.  If your prescription is due for a refill, you may be due for a follow up appointment.  To best provide you care, patients receiving routine medications need to be seen at least once a year.  Patients receiving narcotic/controlled substance medications need to be seen at least once every 3 months.  In the event that your preferred pharmacy does not have the requested medication in stock (e.g. Backordered), it is your responsibility to find another pharmacy that has the requested medication available.  We will gladly send a new prescription to that pharmacy at your request.    Scheduling Tests:    If your physician has ordered radiology tests such as MRI or CT scans, please contact Central Scheduling at [phone] right away to schedule the test.  Once scheduled, the [facility]  Centralized Referral Team will work with your insurance carrier to obtain pre-certification or prior authorization.  Depending on your insurance carrier, approval may take 3-10 days.  It is highly recommended patients assure they have received an authorization before having a test performed.  If test is done without insurance authorization, patient may be responsible for the entire amount billed.      Precertification and Prior Authorizations:  If your physician has recommended that you have a procedure or additional testing performed the [facility] Centralized Referral Team will contact your insurance carrier to obtain pre-certification or prior authorization.    You are strongly encouraged to contact your insurance carrier to verify that your procedure/test has been approved and is a COVERED benefit.  Although the [facility] Centralized Referral Team does its due diligence, the insurance carrier gives the disclaimer that \"Although the procedure is authorized, this does not guarantee payment.\"    Ultimately the patient is responsible for payment.   Thank you for your understanding in this matter.  Paperwork Completion:  If you require FMLA or disability paperwork for your recovery, please make sure to either drop it off or have it faxed to our office at [phone]. Be sure the form has your name and date of birth on it.  The form will be faxed to our Forms Department and they will complete it for you.  There is a 25$ fee for all forms that need to be filled out.  Please be aware there is a 10-14 day turnaround time.  You will need to sign a release of information (TAMIKO) form if your paperwork does not come with one.  You may call the Forms Department with any questions at [phone].  Their fax number is [phone].

## 2024-01-26 ENCOUNTER — PATIENT MESSAGE (OUTPATIENT)
Dept: PAIN CLINIC | Facility: CLINIC | Age: 69
End: 2024-01-26

## 2024-01-26 ENCOUNTER — PATIENT MESSAGE (OUTPATIENT)
Dept: FAMILY MEDICINE CLINIC | Facility: CLINIC | Age: 69
End: 2024-01-26

## 2024-01-29 ENCOUNTER — PATIENT MESSAGE (OUTPATIENT)
Dept: NEUROLOGY | Facility: CLINIC | Age: 69
End: 2024-01-29

## 2024-01-29 ENCOUNTER — TELEPHONE (OUTPATIENT)
Dept: NEUROLOGY | Facility: CLINIC | Age: 69
End: 2024-01-29

## 2024-01-29 DIAGNOSIS — G43.709 CHRONIC MIGRAINE W/O AURA W/O STATUS MIGRAINOSUS, NOT INTRACTABLE: Primary | ICD-10-CM

## 2024-01-29 RX ORDER — METHYLPREDNISOLONE 4 MG/1
TABLET ORAL
Qty: 1 EACH | Refills: 0 | Status: SHIPPED | OUTPATIENT
Start: 2024-01-29

## 2024-01-29 NOTE — TELEPHONE ENCOUNTER
From: Carl Fischer  To: Olga Lidia Romero  Sent: 1/26/2024 10:52 AM CST  Subject: ER visit     I went to the ER this week and had blood work done. I saw my neurologist for follow up as directed yesterday. Do I need to see you?   Zak Fischer

## 2024-01-29 NOTE — TELEPHONE ENCOUNTER
From: Carl Alaniz  To: Leo Burton  Sent: 1/29/2024 12:43 AM CST  Subject: Medicine request    May I please get the steroid pack. Still having ongoing migraines daily.   Thank you  Carl

## 2024-02-01 ENCOUNTER — MED REC SCAN ONLY (OUTPATIENT)
Dept: FAMILY MEDICINE CLINIC | Facility: CLINIC | Age: 69
End: 2024-02-01

## 2024-02-05 ENCOUNTER — TELEPHONE (OUTPATIENT)
Dept: NEUROLOGY | Facility: CLINIC | Age: 69
End: 2024-02-05

## 2024-02-05 NOTE — TELEPHONE ENCOUNTER
Patient already scheduled to discuss shocks she is having in her eyes.    Future Appointments   Date Time Provider Department Center   2/7/2024 11:20 AM Leo Burton MD [facility] [place]   2/21/2024  1:00 PM Iva Nguyen MD [facility] [place]   3/18/2024  1:00 PM Kelly Cruz APRN [facility] [address]   4/5/2024 11:15 AM Keely Marshall DO [facility] [place]

## 2024-02-05 NOTE — TELEPHONE ENCOUNTER
Pt is calling in concerns to having some headaches after seeing the eye doctor. She states that since then she has been having in what she describes as shocks in her head. Pt would like to meet with Dr. Burton via Video visit, due to her just seeing her Jan 25th. Please advise

## 2024-02-07 ENCOUNTER — TELEMEDICINE (OUTPATIENT)
Dept: NEUROLOGY | Facility: CLINIC | Age: 69
End: 2024-02-07
Payer: MEDICARE

## 2024-02-07 DIAGNOSIS — G44.85 PRIMARY STABBING HEADACHE: ICD-10-CM

## 2024-02-07 DIAGNOSIS — G43.709 CHRONIC MIGRAINE W/O AURA W/O STATUS MIGRAINOSUS, NOT INTRACTABLE: Primary | ICD-10-CM

## 2024-02-07 PROCEDURE — 99213 OFFICE O/P EST LOW 20 MIN: CPT | Performed by: OTHER

## 2024-02-07 RX ORDER — GABAPENTIN 100 MG/1
200 CAPSULE ORAL NIGHTLY
Qty: 60 CAPSULE | Refills: 2 | Status: SHIPPED | OUTPATIENT
Start: 2024-02-07

## 2024-02-07 NOTE — PROGRESS NOTES
HPI:    Patient ID: Carl Alaniz is a 68 year old female.      This visit is conducted using Telemedicine with live, interactive video and audio ( video was not working due to technical difficulty on patient's side)    Patient has been referred to the [facility] website at [email or web address] to review the yearly Consent to Treat document.    Patient understands and accepts financial responsibility for any deductible, co-insurance and/or co-pays associated with this service.      HPI    Ms Alaniz is a 68-year-old female who presented for follow-up for headaches.  She was just recently seen and and was advised to see ophthalmology and.  She states that she was diagnosed with narrow angle glaucoma and got treatment done.  States has some relief in the eye pain but continues to have headaches on paroxysmal icepick headache in the left temporal region feel like a neurogenic pain.  Last time we also recommended to starting venlafaxine with the patient did not started due to concerns of side effects.  She is already on amitriptyline for fibromyalgia and headaches  On Nurtec for migraine prevention but then often gets rebound headaches.  Tried Medrol dose pack but still has headaches.      Patient is a 68-year-old female who presented with complaints of increased headaches and visual blurriness.  She has a chronic history of headaches and migraines and states now she has a constant pressure behind her eyes associated with visual blurriness.    She continues to have superimposed icepick headache in the left temporal region.   Had left cervical facet joint injections and also had occipital nerve blocks done in the past with mild relief  States she was having some sinus pressure and URI symptoms and then got doxycycline but no relief.  Her COVID test was negative  She came to the ER and got CT head and CTA brain and neck performed which was negative for any acute abnormality.  There was some mild irregularity in the  left carotid.       Last office visit: Nov 2023  Patient is a 68 year old female with history of chronic migraines.  States she has been having new \"ice pick\" headaches in left temporal region and started after getting left cervical C5/6 and C6/7 facet joint injection. States the left temple sharp pain lasts on and off throughout the day. She is on Nurtec for migraines but insurance didn't approved it so not on any abortive medication           Patient is a 67 year old female with history of chronic migraines.  States continues to have frequent headaches - tried Emgality and Botox with mild benefit. Last Botox injection was in Sept 2021. Reports Nurtec is effective but insurance didn't approved it as preventive therapy. She is taking it as an abortive agent. She states left occipital pain is flaring up again and has pain in the posterior trapezius region. She also c/o left temporal  pain.She is requesting for nerve block    She was following with pain medicine and had a radiofrequency ablation of the left cervical medial branch done which provided some relief in the past           HISTORY:  Past Medical History:   Diagnosis Date    Abnormal Holter monitor finding 10/20/2005    rare PAC's and PVC's    Abnormal MRI, cervical spine 02/05/2008    uncovertebral degenerative change is suggested, right-sided C2 C3, and left sided C4 C5 with disc bulging noted also at the C4 C5 level.  small central protrusions effacing the subarachnoid space C5 C6 and C6 C7    Chest pain 02/2002    with exercises    Chronic fatigue syndrome     Colitis 04/2006    sigmoid erythema consistent with nonspecific colitis    Decreased sexual desire 02/1998    Depression with anxiety     psychiatry: Dr. Iva Nguyen    Diverticulosis 04/2006    scattered, minimal    Endometriosis     s/p ablation therapy    Fibromyalgia     History of colon polyps     GI: Dr. Abisai Bay    History of femur fracture 04/18/2012    s/p closed reduction  percutaneous screw fixation of right femoral neck fracture  internal fixation of the right hip    HTN (hypertension)     Hyperlipidemia     IBD (inflammatory bowel disease)     IBS (irritable bowel syndrome) 03/17/2006    chronic, recurrent. GI: Dr. Abisai Bay    Intervertebral disc protrusion 04/06/2005    L5/S1 small central protrusion effacing the thecal sax with a small annular fissure in the posterior annular fibers    Migraines     neuro: Dr. Leo Burton    Nephrolithiasis     Osteopenia 05/31/2012    Palpitations 02/1998    likely anxiety related to dystrophic nails secondary to mechanical injury     Postmenopausal HRT (hormone replacement therapy)     Seasonal allergies     Vitamin D deficiency 03/04/2019      Past Surgical History:   Procedure Laterality Date    CHOLECYSTECTOMY  11/10    bile leak, sepsis, pancreatitis    COLONOSCOPY  10/16, 4/06, 9/00, 8/97 2000 polyps, 2006 no polyps    COLONOSCOPY  11/2019    COLONOSCOPY N/A 11/14/2019    Procedure: COLONOSCOPY, POSSIBLE BIOPSY, POSSIBLE POLYPECTOMY 67127;  Surgeon: Abisai Bay MD;  Location: [facility]    EGD SCOPE  2000, 3/18    normal, empiric dilation    ENDOMETR ABLATE, THERMAL  2000    ERCP,DIAGNOSTIC  2/11    LAPAROSCOPY,LYSIS ADHESNS      endometirosis    LEG/ANKLE SURGERY PROC UNLISTED  4/18/2012    closed reduction percutaneous screw fixation of right femoral neck fracture  internal fixation of the right hip    OTHER  April 2016    Cervical Facet injection, Ganglion block    REMOVAL GALLBLADDER      TUBAL LIGATION        Family History   Problem Relation Age of Onset    Lung Disorder Mother         COPD, emphysema    Colon Cancer Maternal Grandmother         colon    Cancer Other         ovarian and breast    Breast Cancer Paternal Aunt 20      Social History     Socioeconomic History    Marital status:     Number of children: 0   Occupational History    Occupation: on disability, former     Tobacco Use    Smoking status: Never    Smokeless tobacco: Never    Tobacco comments:     20 yrs of second hand smoke, mother smoked   Vaping Use    Vaping Use: Never used   Substance and Sexual Activity    Alcohol use: Yes     Alcohol/week: 0.0 - 1.0 standard drinks of alcohol     Comment: occasional    Drug use: Never     Comment: salve    Sexual activity: Not Currently     Partners: Male     Birth control/protection: Tubal Ligation     Comment: , together since 1994   Other Topics Concern    Caffeine Concern Yes     Comment: 1-1.5 cup of coffee in am    Exercise Yes     Comment: walking        Review of Systems   Constitutional: Negative.    HENT: Negative.     Eyes:  Positive for visual disturbance.   Respiratory: Negative.     Cardiovascular: Negative.    Gastrointestinal: Negative.    Endocrine: Negative.    Genitourinary: Negative.    Musculoskeletal:  Positive for neck pain.   Skin: Negative.    Allergic/Immunologic: Negative.    Neurological:  Positive for headaches.   Hematological: Negative.    Psychiatric/Behavioral: Negative.     All other systems reviewed and are negative.           Current Outpatient Medications   Medication Sig Dispense Refill    Atogepant 60 MG Oral Tab Take 60 mg by mouth daily. 30 tablet 5    gabapentin 100 MG Oral Cap Take 2 capsules (200 mg total) by mouth nightly. 60 capsule 2    methylPREDNISolone (MEDROL) 4 MG Oral Tablet Therapy Pack Take as directed 1 each 0    Naratriptan HCl 2.5 MG Oral Tab Take 1 tablet (2.5 mg total) by mouth as needed. 8 tablet 1    indomethacin 50 MG Oral Cap Take 1 capsule (50 mg total) by mouth daily. 30 capsule 0    ALPRAZolam 0.5 MG Oral Tab Take 0.5-1 tablets (0.25-0.5 mg total) by mouth daily as needed for Anxiety. 30 tablet 2    amitriptyline 75 MG Oral Tab Take 1 tablet (75 mg total) by mouth nightly. 30 tablet 2    clonazePAM 0.5 MG Oral Tab Take 1.5-2 tablets (0.75-1 mg total) by mouth nightly as needed for Anxiety. 60 tablet 2     tiZANidine 2 MG Oral Tab Take 1 tablet (2 mg total) by mouth 2 (two) times daily as needed. 60 tablet 2    Cholecalciferol (VITAMIN D3) 250 MCG (13125 UT) Oral Cap Take 1 capsule by mouth daily. 3000 international unit      amLODIPine 5 MG Oral Tab Take 1 tablet (5 mg total) by mouth daily.      docusate sodium (DULCOLAX PINK STOOL SOFTENER) 100 MG Oral Cap Take 1 capsule (100 mg total) by mouth 2 (two) times daily. 30 capsule 0    dicyclomine 20 MG Oral Tab Take 1 tablet (20 mg total) by mouth 4 (four) times daily.      estradiol 1 MG Oral Tab       progesterone 100 MG Oral Cap       albuterol (PROAIR HFA) 108 (90 Base) MCG/ACT Inhalation Aero Soln Inhale 2 puffs into the lungs every 6 (six) hours as needed for Wheezing. 18 g 1    Esomeprazole Magnesium 20 MG Oral Capsule Delayed Release Take 2 capsules (40 mg total) by mouth nightly.      MULTIVITAMIN TAB/CAP Take 1 tablet by mouth daily.       Allergies:  Allergies   Allergen Reactions    Codeine ITCHING    Morphine NAUSEA AND VOMITING and ITCHING    Sulfa Antibiotics ITCHING    Statins MYALGIA    Alc-Benzyl Alc-Sulfamethoxazole-Trimethoprim RASH    Amoxicillin-Pot Clavulanate      Other reaction(s): rash    Bactrim [Sulfamethoxazole W/Trimethoprim] ITCHING    Cefdinir DIARRHEA     Abdominal pain    Cefdinir OTHER (SEE COMMENTS)    Ciprofloxacin ITCHING    Citric Acid OTHER (SEE COMMENTS)    Clarithromycin NAUSEA ONLY     Nausea and abdominal cramping    Dairy Products OTHER (SEE COMMENTS)    Dust     Hydrocodone ITCHING    Lactose OTHER (SEE COMMENTS)    Pain Relief [Goodys Extra Strength] ITCHING     Any strong pain med, Norco, Oxycodone, etc.    Peppermint Flavor     Reglan [Metoclopramide] PALPITATIONS and OTHER (SEE COMMENTS)     Hypertension with Reglan administration via IV    Seasonal     Tomato OTHER (SEE COMMENTS)    Zomig [Zolmitriptan] ITCHING     PHYSICAL EXAM:   Physical Exam    Exam not done this visit      Exam from last office visit    General  Appearance: Well nourished, well developed, no apparent distress.   HEENT: Normocephalic and atraumatic.   Cardiovascular: Normal rate, regular rhythm and normal heart sounds.    Pulmonary/Chest: Effort normal and breath sounds normal.   Abdominal: Soft. Bowel sounds are normal.     Neurological patient is awake, alert and oriented to person, place and time with normal memory, fund of knowledge, attention/concentration and language.  Cranial Nerves:   II: Visual fields: normal  III: Pupils: equal, round, reactive to light  III,IV,VI: Extra Ocular Movements: intact  V: Facial sensation: intact  VII: Facial strength: intact  VIII: Hearing: intact  IX: Palate: intact  XI: Shoulder shrug: intact  XII: Tongue movement: normal    Motor : Normal tone. Strength is  5 out of 5 in all extremities bilaterally.  Sensory: Sensory examination is normal to light touch and pinprick   Coordination: Intact  Gait: Normal         TESTS/IMAGING:           Impression   CONCLUSION:       1.  No evidence of acute intracranial process.       2. Unremarkable CTA of the bunlnd-ti-Ldngst.     3. There is some irregularity to the vessel wall involving the left distal internal carotid artery which is mildly \"lumpy/bumpy\" which can be seen in fibromuscular dysplasia.          ASSESSMENT/PLAN:       ICD-10-CM    1. Chronic migraine w/o aura w/o status migrainosus, not intractable  G43.709       2. Primary stabbing headache  G44.85             Chronic headache disorder mixed migraines and neck tension headache  Superimposed left temporal stabbing\" icepick\" headaches  New onset eye pressure and visual blurriness- found to have narrow angle glaucoma      CTA head was negative for acute abnormality.  CTA head and neck unremarkable there is mild tortuosity noted in the left distal carotid artery but then nothing definitive for FMD. No cerebral aneurysms    Newly diagnosed narrow angle glaucoma- following with Ophthalmology    Discussed medication  options again.  Will start gabapentin 200 mg at night for the paroxysmal left temporal pain. ESR was normal.      Change Nurtec to Qulipta 60 mg daily for migraine prevention    Continue Indocin as needed for stabbing headaches and naratriptan for migraines      Total 20 minutes spent with the patient with a long discussion regarding the treatment options and reviewing the imaging studies.      Leo Burton MD  [facility] [place]      [facility] This Visit:  Requested Prescriptions     Signed Prescriptions Disp Refills    Atogepant 60 MG Oral Tab 30 tablet 5     Sig: Take 60 mg by mouth daily.    gabapentin 100 MG Oral Cap 60 capsule 2     Sig: Take 2 capsules (200 mg total) by mouth nightly.       Imaging & Referrals:  None     ID#[id number]

## 2024-02-15 ENCOUNTER — TELEPHONE (OUTPATIENT)
Dept: NEUROLOGY | Facility: CLINIC | Age: 69
End: 2024-02-15

## 2024-02-15 NOTE — TELEPHONE ENCOUNTER
Patient asking taking qulipta for migraine mgmt if headache arises what other medication can patient take

## 2024-02-15 NOTE — TELEPHONE ENCOUNTER
Advised patient she can take her naratriptan for a breakthrough migraine. Patient states she just received notice that Naratritptan is not covered advised will attempt a PA.     Closed   2/15/2024  2:35 PM  Close reason: Prior Authorization not required for patient/medication   Note from payer: The patient currently has access to the requested medication and a Prior Authorization is not needed for the patient/medication.     No PA is needed for naratriptan.

## 2024-02-16 ENCOUNTER — TELEPHONE (OUTPATIENT)
Dept: NEUROLOGY | Facility: CLINIC | Age: 69
End: 2024-02-16

## 2024-02-16 NOTE — TELEPHONE ENCOUNTER
Received letter stating patient received a temporary supply of the medication. The drug is either not on their list of covered drugs or it is subject to limits. Will attempt PA    Completed questions through epic. Will await determination

## 2024-02-19 ENCOUNTER — TELEPHONE (OUTPATIENT)
Dept: NEUROLOGY | Facility: CLINIC | Age: 69
End: 2024-02-19

## 2024-02-19 NOTE — TELEPHONE ENCOUNTER
Received fax from [employer].  Approval from 01/01/2024-05/16/2024  Medication Qulipta  Case number n/a  Faxed approval to dispensing pharmacy  Received fax confirmation

## 2024-02-19 NOTE — TELEPHONE ENCOUNTER
Spoke with Rash from [facility] who states disregard refill request as patient had remaining refill from [facility] transferred to [facility].

## 2024-03-04 ENCOUNTER — PATIENT MESSAGE (OUTPATIENT)
Dept: FAMILY MEDICINE CLINIC | Facility: CLINIC | Age: 69
End: 2024-03-04

## 2024-03-05 NOTE — TELEPHONE ENCOUNTER
From: Carl Alaniz  To: Olga Lidia Romero  Sent: 3/4/2024 2:31 PM CST  Subject: Shoulder injury     hi I recently injured my right shoulder and I’m having a hard time lifting my arm Is there anything I can do to treat the injury or do I need to get an x-ray or MRI? Please advise. PS I am putting ice and heat on it and taking Advil

## 2024-03-08 ENCOUNTER — TELEPHONE (OUTPATIENT)
Dept: NEUROLOGY | Facility: CLINIC | Age: 69
End: 2024-03-08

## 2024-03-08 DIAGNOSIS — M62.89 MUSCLE TIGHTNESS: Primary | ICD-10-CM

## 2024-03-08 RX ORDER — TIZANIDINE 2 MG/1
2 TABLET ORAL 2 TIMES DAILY PRN
Qty: 60 TABLET | Refills: 2 | Status: SHIPPED | OUTPATIENT
Start: 2024-03-08

## 2024-03-08 NOTE — TELEPHONE ENCOUNTER
Medication: Tizanidine     Date of last refill: 9/6/23 (#30/2)  [place]      Last office visit: 2/7/2024  Due back to clinic per last office note:  3 months  Date next office visit scheduled:    Future Appointments   Date Time Provider Department Center   3/18/2024  1:00 PM Kelly Cruz APRN [facility] [address]   4/5/2024 11:15 AM Keely Marshall DO [facility] [place]   4/10/2024  1:30 PM Iva Nguyen MD [facility] [place]           Last OV note recommendation:    Chronic headache disorder mixed migraines and neck tension headache  Superimposed left temporal stabbing\" icepick\" headaches  New onset eye pressure and visual blurriness- found to have narrow angle glaucoma        CTA head was negative for acute abnormality.  CTA head and neck unremarkable there is mild tortuosity noted in the left distal carotid artery but then nothing definitive for FMD. No cerebral aneurysms     Newly diagnosed narrow angle glaucoma- following with Ophthalmology     Discussed medication options again.  Will start gabapentin 200 mg at night for the paroxysmal left temporal pain. ESR was normal.        Change Nurtec to Qulipta 60 mg daily for migraine prevention     Continue Indocin as needed for stabbing headaches and naratriptan for migraines   Return in 3 months (on 5/7/2024).

## 2024-03-11 DIAGNOSIS — G43.701 CHRONIC MIGRAINE WITHOUT AURA WITH STATUS MIGRAINOSUS, NOT INTRACTABLE: ICD-10-CM

## 2024-03-11 RX ORDER — NARATRIPTAN 2.5 MG/1
2.5 TABLET ORAL AS NEEDED
Qty: 8 TABLET | Refills: 2 | Status: SHIPPED | OUTPATIENT
Start: 2024-03-11

## 2024-03-11 NOTE — TELEPHONE ENCOUNTER
Medication: NARATRIPTAN HCL 2.5 MG Oral Tab      Date of last refill: 01/23/2024 (#8/1)  [place]     Last office visit: 02/07/2024  Due back to clinic per last office note:  Around 05/07/2024  Date next office visit scheduled:    Future Appointments   Date Time Provider Department Center   3/18/2024  1:00 PM Kelly Cruz APRN [facility] [address]   4/5/2024 11:15 AM Keely Marshall DO [facility] [place]   4/10/2024  1:30 PM Iva Nguyen MD [facility] [place]           Last OV note recommendation:    ASSESSMENT/PLAN:          ICD-10-CM     1. Chronic migraine w/o aura w/o status migrainosus, not intractable  G43.709         2. Primary stabbing headache  G44.85                  Chronic headache disorder mixed migraines and neck tension headache  Superimposed left temporal stabbing\" icepick\" headaches  New onset eye pressure and visual blurriness- found to have narrow angle glaucoma        CTA head was negative for acute abnormality.  CTA head and neck unremarkable there is mild tortuosity noted in the left distal carotid artery but then nothing definitive for FMD. No cerebral aneurysms     Newly diagnosed narrow angle glaucoma- following with Ophthalmology     Discussed medication options again.  Will start gabapentin 200 mg at night for the paroxysmal left temporal pain. ESR was normal.        Change Nurtec to Qulipta 60 mg daily for migraine prevention     Continue Indocin as needed for stabbing headaches and naratriptan for migraines        Total 20 minutes spent with the patient with a long discussion regarding the treatment options and reviewing the imaging studies.       Leo Burton MD  [facility] [place]

## 2024-03-18 ENCOUNTER — TELEPHONE (OUTPATIENT)
Dept: INTERNAL MEDICINE CLINIC | Facility: CLINIC | Age: 69
End: 2024-03-18

## 2024-03-20 ENCOUNTER — TELEPHONE (OUTPATIENT)
Dept: NEUROLOGY | Facility: CLINIC | Age: 69
End: 2024-03-20

## 2024-03-20 NOTE — TELEPHONE ENCOUNTER
Received fax from [employer] stating patient received a temporary supply of Naratriptan and may be subject to approval determination (PA) or quantity limit.   Attempted PA electronically and received message that PA is not required.   Per dispense review, patient did get an early refill the last time.

## 2024-04-10 PROBLEM — Z63.4 BEREAVEMENT: Status: ACTIVE | Noted: 2024-04-10

## 2024-05-21 ENCOUNTER — HOSPITAL ENCOUNTER (OUTPATIENT)
Dept: GENERAL RADIOLOGY | Facility: HOSPITAL | Age: 69
Discharge: HOME | End: 2024-05-21
Attending: NURSE PRACTITIONER

## 2024-05-21 ENCOUNTER — OFFICE VISIT (OUTPATIENT)
Dept: PAIN CLINIC | Facility: CLINIC | Age: 69
End: 2024-05-21

## 2024-05-21 VITALS — DIASTOLIC BLOOD PRESSURE: 78 MMHG | SYSTOLIC BLOOD PRESSURE: 128 MMHG | HEART RATE: 74 BPM | OXYGEN SATURATION: 99 %

## 2024-05-21 DIAGNOSIS — M50.30 DEGENERATION OF CERVICAL DISC WITHOUT MYELOPATHY: ICD-10-CM

## 2024-05-21 DIAGNOSIS — M47.812 CERVICAL SPONDYLOSIS: Primary | ICD-10-CM

## 2024-05-21 DIAGNOSIS — M47.812 CERVICAL SPONDYLOSIS: ICD-10-CM

## 2024-05-21 PROCEDURE — 72052 X-RAY EXAM NECK SPINE 6/>VWS: CPT | Performed by: NURSE PRACTITIONER

## 2024-05-21 PROCEDURE — 99214 OFFICE O/P EST MOD 30 MIN: CPT | Performed by: NURSE PRACTITIONER

## 2024-05-21 RX ORDER — METHYLPREDNISOLONE 4 MG/1
TABLET ORAL
Qty: 1 EACH | Refills: 0 | Status: SHIPPED | OUTPATIENT
Start: 2024-05-21

## 2024-05-21 NOTE — PROGRESS NOTES
HPI:   Carl Alaniz presents with complaints of left neck pain and right hip pain.  Patient denies any fever chills loss of bowel or bladder control.  Patient denies any radicular symptoms.  Patient is unsure as to how her symptoms flared although she does report that she was working in the garden.  Patient does have a history of cervical facet joint injections at C5-6 C6-7 in the past.  Patient also reports that she had a hip fracture and does have hardware in her hip and does have bone spurs on the right hip.  She is tender along the right hip bursa when she presses on that.  She also reports that sometimes when she is walking she has increased pain.  Patient also reports she was working on the computer more and that may have contributed to her increase in her neck pain..    The pain is described as moderate aching, stabbing, grabbing, soreness that is constant .  The patient’s activity level has increased since last visit.  The pain is worst in the early morning, in the late evening.    The following activities will increase the patient’s pain: walking, standing, lifting, bending, any prolonged activity, reading/computer work    The following activities decrease the patient’s pain: medications, limiting activity level, changing position    Functional Assessment: Patient reports that they are able to complete all of their ADL's such as eating, bathing, using the toilet, dressing and getting up from a bed or a chair independently.    Current Medications:  Current Outpatient Medications   Medication Sig Dispense Refill    methylPREDNISolone (MEDROL) 4 MG Oral Tablet Therapy Pack Take as directed 1 each 0    valACYclovir 500 MG Oral Tab       REPATHA SURECLICK 140 MG/ML Subcutaneous Solution Auto-injector Repatha SureClick 140 mg/mL subcutaneous pen injector, [RxNorm: 1646907]      ALPRAZolam 0.5 MG Oral Tab Take 0.5-1 tablets (0.25-0.5 mg total) by mouth daily as needed for Anxiety. 30 tablet 2    amitriptyline  75 MG Oral Tab Take 1 tablet (75 mg total) by mouth nightly. 30 tablet 2    clonazePAM 0.5 MG Oral Tab Take 1.5-2 tablets (0.75-1 mg total) by mouth nightly as needed for Anxiety. 60 tablet 2    Naratriptan HCl 2.5 MG Oral Tab Take 1 tablet (2.5 mg total) by mouth as needed. 8 tablet 2    tiZANidine 2 MG Oral Tab Take 1 tablet (2 mg total) by mouth 2 (two) times daily as needed. 60 tablet 2    Cholecalciferol (VITAMIN D3) 250 MCG (62175 UT) Oral Cap Take 1 capsule by mouth daily. 3000 international unit      amLODIPine 5 MG Oral Tab Take 1 tablet (5 mg total) by mouth daily.      docusate sodium (DULCOLAX PINK STOOL SOFTENER) 100 MG Oral Cap Take 1 capsule (100 mg total) by mouth 2 (two) times daily. 30 capsule 0    dicyclomine 20 MG Oral Tab Take 1 tablet (20 mg total) by mouth 4 (four) times daily.      estradiol 1 MG Oral Tab       albuterol (PROAIR HFA) 108 (90 Base) MCG/ACT Inhalation Aero Soln Inhale 2 puffs into the lungs every 6 (six) hours as needed for Wheezing. 18 g 1    Esomeprazole Magnesium 20 MG Oral Capsule Delayed Release Take 2 capsules (40 mg total) by mouth nightly.      MULTIVITAMIN TAB/CAP Take 1 tablet by mouth daily.      gabapentin 100 MG Oral Cap Take 2 capsules (200 mg total) by mouth nightly. (Patient not taking: Reported on 5/21/2024)      gabapentin 300 MG Oral Cap Take 1 capsule (300 mg total) by mouth in the morning and 1 capsule (300 mg total) before bedtime. Take 300 mg nightly x 3 days then 300 mg twice daily. (Patient not taking: Reported on 5/21/2024) 60 capsule 2    Atogepant 60 MG Oral Tab Take 60 mg by mouth daily. (Patient not taking: Reported on 5/21/2024) 30 tablet 5      Patient requires assistance with: No assistance required    Have you recently had any feelings of harming yourself or others? The patient's response was no.    Physical Exam:   /78 (BP Location: Left arm, Patient Position: Sitting, Cuff Size: adult)   Pulse 74   SpO2 99%   VAS Pain Score:   5/10  General Appearance: Well developed, well nourished, normal build, independent body habitus, no apparent physical disabilities, well groomed    Neurological Exam: WNL-Orientation to time, place and person, normal mood & effect, normal concentration & attention span  Inspection:   Coordination:  Well coordinated; able to engage in rapid alternating movements bilateral upper extremities    ROM Cervical Spine:  See chart below:  Motion Right (+ or -) Left (+ or -)   Cervical flexion - +   Cervical extension - +   Cervical lateral bending - +   Cervical rotation - +     Integument:  Skin over area of cervical spine intact; no erythema, rashes, excoriations, lesions noted    Palpation:  See chart below:  Palpation of Right (+ or -) Left (+ or -)   Cervical Facets - +   Thoracic Facets - -   Paraspinal - +   Trapezius - +   Scapula - -     DTR:  DTR grossly intact throughout bilateral upper extremities, 2/4 throughout    Strength:  Strength of bilateral upper extremities grossly intact; 5/5 throughout    Sensation:  Normal sensation noted to light touch and pressure throughout bilateral upper extremities.    Tests:  Test Right (+ or -) Left (+ or -)   Spurling - -   Maya’s Test - -   Clonus - -     ID#[id number]    ++ tenderness right hip bursa  BLE strength 5/5  Patient able to toe heel walk  No pain with hip range of motion  Radiology/Lab Test Reviewed:   Last cervical MRI 2021 shows cervical degenerative changes had progressed  Lab Results   Component Value Date    WBC 8.1 01/24/2024    WBC 8.0 01/03/2023    WBC 7.5 10/03/2022     Lab Results   Component Value Date    HEMOGLOBIN 13.2 08/13/2015     Lab Results   Component Value Date    .0 01/24/2024    .0 01/03/2023    .0 10/03/2022           Patient Education: Patient was advised to continue normal activities as tolerated and was advised against any form of bedrest, since recent guidelines promote and encourage physical activity for improvment of  functionality and overall pain.  (Family Practice Vol. 16, No. 1, 85-69Â© [employer] 1999 ), Patient was given brochure,website information, and handouts., Patient was shown interactive content, shown and explained procedure on spinal model..  Patient educated and verbalized understanding.  Medical Decision Making:   Diagnosis:    Encounter Diagnoses   Name Primary?    Cervical spondylosis Yes    Degeneration of cervical disc without myelopathy      Impression: Patient is 68-year-old female who has been treated in the pain clinic for neck pain and hip pain in the past.  Her last cervical injections were October 31, 2023 at C5-6 C6-7.  Today patient's pain is above that level.  She has tried her muscle relaxants and home medications she currently has.  She is also tender along the right bursa to palpation.  We did discuss repeating injection therapy however updated imaging minimally cervical extension flexion films would be helpful.  Discussed with patient plan below patient indicated understanding and agrees with this plan  Plan:   >oral medrol yudith  >cervical xrays with flex/ext views: will consider left Cervical MBB levels based on xray finding  >monitor right hip bursa pain after medrol  No orders of the defined types were placed in this encounter.      Meds & Refills for this Visit:  Requested Prescriptions     Signed Prescriptions Disp Refills    methylPREDNISolone (MEDROL) 4 MG Oral Tablet Therapy Pack 1 each 0     Sig: Take as directed       Imaging & Consults:  None    The patient indicates understanding of these issues and agrees to the plan.      ID#[id number]

## 2024-05-21 NOTE — PATIENT INSTRUCTIONS
Refill policies:    Allow 2-3 business days for refills; controlled substances may take longer.  Contact your pharmacy at least 5 days prior to running out of medication and have them send an electronic request or submit request through the “request refill” option in your [employer] account.  Refills are not addressed on weekends; covering physicians do not authorize routine medications on weekends.  No narcotics or controlled substances are refilled after noon on Fridays or by on call physicians.  By law, narcotics must be electronically prescribed.  A 30 day supply with no refills is the maximum allowed.  If your prescription is due for a refill, you may be due for a follow up appointment.  To best provide you care, patients receiving routine medications need to be seen at least once a year.  Patients receiving narcotic/controlled substance medications need to be seen at least once every 3 months.  In the event that your preferred pharmacy does not have the requested medication in stock (e.g. Backordered), it is your responsibility to find another pharmacy that has the requested medication available.  We will gladly send a new prescription to that pharmacy at your request.    Scheduling Tests:    If your physician has ordered radiology tests such as MRI or CT scans, please contact Central Scheduling at [phone] right away to schedule the test.  Once scheduled, the [facility] Centralized Referral Team will work with your insurance carrier to obtain pre-certification or prior authorization.  Depending on your insurance carrier, approval may take 3-10 days.  It is highly recommended patients assure they have received an authorization before having a test performed.  If test is done without insurance authorization, patient may be responsible for the entire amount billed.      Precertification and Prior Authorizations:  If your physician has recommended that you have a procedure or additional testing performed the [facility]  Centralized Referral Team will contact your insurance carrier to obtain pre-certification or prior authorization.    You are strongly encouraged to contact your insurance carrier to verify that your procedure/test has been approved and is a COVERED benefit.  Although the [facility] Centralized Referral Team does its due diligence, the insurance carrier gives the disclaimer that \"Although the procedure is authorized, this does not guarantee payment.\"    Ultimately the patient is responsible for payment.   Thank you for your understanding in this matter.  Paperwork Completion:  If you require FMLA or disability paperwork for your recovery, please make sure to either drop it off or have it faxed to our office at [phone]. Be sure the form has your name and date of birth on it.  The form will be faxed to our Forms Department and they will complete it for you.  There is a 25$ fee for all forms that need to be filled out.  Please be aware there is a 10-14 day turnaround time.  You will need to sign a release of information (TAMIKO) form if your paperwork does not come with one.  You may call the Forms Department with any questions at [phone].  Their fax number is [phone].

## 2024-05-21 NOTE — PROGRESS NOTES
Patient presents in office today with reported pain in bilateral neck and head, hips, joints    Current pain level reported = 5/10    Last reported dose of n/a      Narcotic Contract renewal n/a    Urine Drug screen n/a

## 2024-06-07 DIAGNOSIS — M62.89 MUSCLE TIGHTNESS: ICD-10-CM

## 2024-06-07 NOTE — TELEPHONE ENCOUNTER
Medication: TIZANIDINE 2 MG Oral Tab      Date of last refill: 03/08/2024 (#60/2)  [place]     Last office visit: 02/07/2024  Due back to clinic per last office note:  Around 05/07/2024  Date next office visit scheduled:    No future appointments.        Last OV note recommendation:    ASSESSMENT/PLAN:          ICD-10-CM     1. Chronic migraine w/o aura w/o status migrainosus, not intractable  G43.709         2. Primary stabbing headache  G44.85                  Chronic headache disorder mixed migraines and neck tension headache  Superimposed left temporal stabbing\" icepick\" headaches  New onset eye pressure and visual blurriness- found to have narrow angle glaucoma        CTA head was negative for acute abnormality.  CTA head and neck unremarkable there is mild tortuosity noted in the left distal carotid artery but then nothing definitive for FMD. No cerebral aneurysms     Newly diagnosed narrow angle glaucoma- following with Ophthalmology     Discussed medication options again.  Will start gabapentin 200 mg at night for the paroxysmal left temporal pain. ESR was normal.        Change Nurtec to Qulipta 60 mg daily for migraine prevention     Continue Indocin as needed for stabbing headaches and naratriptan for migraines        Total 20 minutes spent with the patient with a long discussion regarding the treatment options and reviewing the imaging studies.       Leo Burton MD  [facility] [place]

## 2024-06-08 DIAGNOSIS — G43.701 CHRONIC MIGRAINE WITHOUT AURA WITH STATUS MIGRAINOSUS, NOT INTRACTABLE: ICD-10-CM

## 2024-06-10 RX ORDER — TIZANIDINE 2 MG/1
2 TABLET ORAL 2 TIMES DAILY PRN
Qty: 180 TABLET | Refills: 0 | Status: SHIPPED | OUTPATIENT
Start: 2024-06-10

## 2024-06-10 RX ORDER — NARATRIPTAN 2.5 MG/1
2.5 TABLET ORAL AS NEEDED
Qty: 8 TABLET | Refills: 2 | Status: SHIPPED | OUTPATIENT
Start: 2024-06-10

## 2024-06-10 NOTE — TELEPHONE ENCOUNTER
Medication: NARATRIPTAN HCL 2.5 MG Oral Tab      Date of last refill: 03/11/2024 (#8/2)  [place]     Last office visit: 02/07/2024  Due back to clinic per last office note:  Around 05/07/2024  Date next office visit scheduled:    No future appointments.        Last OV note recommendation:    ASSESSMENT/PLAN:          ICD-10-CM     1. Chronic migraine w/o aura w/o status migrainosus, not intractable  G43.709         2. Primary stabbing headache  G44.85                  Chronic headache disorder mixed migraines and neck tension headache  Superimposed left temporal stabbing\" icepick\" headaches  New onset eye pressure and visual blurriness- found to have narrow angle glaucoma        CTA head was negative for acute abnormality.  CTA head and neck unremarkable there is mild tortuosity noted in the left distal carotid artery but then nothing definitive for FMD. No cerebral aneurysms     Newly diagnosed narrow angle glaucoma- following with Ophthalmology     Discussed medication options again.  Will start gabapentin 200 mg at night for the paroxysmal left temporal pain. ESR was normal.        Change Nurtec to Qulipta 60 mg daily for migraine prevention     Continue Indocin as needed for stabbing headaches and naratriptan for migraines        Total 20 minutes spent with the patient with a long discussion regarding the treatment options and reviewing the imaging studies.       Leo Burton MD  [facility] [place]

## 2024-06-25 ENCOUNTER — TELEPHONE (OUTPATIENT)
Dept: NEUROLOGY | Facility: CLINIC | Age: 69
End: 2024-06-25

## 2024-08-06 RX ORDER — METHYLPREDNISOLONE 4 MG/1
TABLET ORAL
Qty: 1 EACH | Refills: 0 | OUTPATIENT
Start: 2024-08-06

## 2024-08-06 NOTE — TELEPHONE ENCOUNTER
I would not repeat medrol at this time/patient had one in May.  If symptoms are severe we would consider injection therapy/if she is requesting this refill we should see her for follow up to evaluate and discuss next treatment options

## 2024-08-06 NOTE — TELEPHONE ENCOUNTER
Medication: Methylprednisolone 4mg oral tablet therapy pack    Date of last refill: 5/21/24    Last office visit: 5/21/24  Due back to clinic per last office note:  Not indicated  Date next office visit scheduled:  None    Last OV note recommendation:   Impression: Patient is 68-year-old female who has been treated in the pain clinic for neck pain and hip pain in the past.  Her last cervical injections were October 31, 2023 at C5-6 C6-7.  Today patient's pain is above that level.  She has tried her muscle relaxants and home medications she currently has.  She is also tender along the right bursa to palpation.  We did discuss repeating injection therapy however updated imaging minimally cervical extension flexion films would be helpful.  Discussed with patient plan below patient indicated understanding and agrees with this plan  Plan:   >oral medrol yudith  >cervical xrays with flex/ext views: will consider left Cervical MBB levels based on xray finding  >monitor right hip bursa pain after medrol  No orders of the defined types were placed in this encounter.

## 2024-08-06 NOTE — TELEPHONE ENCOUNTER
Called patient to let her know we would not be refilling methylprednisolone pack. Pt stated that it was for her migraine. This RN stated that she should follow up with neurology in regards to migraines. Pt verbalized understanding.

## 2024-08-08 ENCOUNTER — PATIENT MESSAGE (OUTPATIENT)
Dept: NEUROLOGY | Facility: CLINIC | Age: 69
End: 2024-08-08

## 2024-08-08 RX ORDER — METHYLPREDNISOLONE 4 MG/1
TABLET ORAL
Qty: 1 EACH | Refills: 0 | Status: SHIPPED | OUTPATIENT
Start: 2024-08-08

## 2024-08-08 NOTE — TELEPHONE ENCOUNTER
From: Carl Alaniz  To: Leo Burton  Sent: 8/8/2024 10:56 AM CDT  Subject: On going migrane    Hi I was wondering if I can get the steroid pack for daily migraines. Dr GONZALEZ has given me this in the past when my migraine is continuous and not breaking with my naratriptane.   Zak Alaniz

## 2024-08-08 NOTE — TELEPHONE ENCOUNTER
LOV 02/07/2024    Chronic headache disorder mixed migraines and neck tension headache  Superimposed left temporal stabbing\" icepick\" headaches  New onset eye pressure and visual blurriness- found to have narrow angle glaucoma        CTA head was negative for acute abnormality.  CTA head and neck unremarkable there is mild tortuosity noted in the left distal carotid artery but then nothing definitive for FMD. No cerebral aneurysms     Newly diagnosed narrow angle glaucoma- following with Ophthalmology     Discussed medication options again.  Will start gabapentin 200 mg at night for the paroxysmal left temporal pain. ESR was normal.        Change Nurtec to Qulipta 60 mg daily for migraine prevention     Continue Indocin as needed for stabbing headaches and naratriptan for migraines

## 2024-09-08 DIAGNOSIS — G43.701 CHRONIC MIGRAINE WITHOUT AURA WITH STATUS MIGRAINOSUS, NOT INTRACTABLE: ICD-10-CM

## 2024-09-09 RX ORDER — NARATRIPTAN 2.5 MG/1
2.5 TABLET ORAL AS NEEDED
Qty: 8 TABLET | Refills: 0 | Status: SHIPPED | OUTPATIENT
Start: 2024-09-09

## 2024-09-09 NOTE — TELEPHONE ENCOUNTER
[facility] sent to schedule appointment.    Medication: Naratriptan 2.5mg      Date of last refill: 6/10/24 (#8/2)  [place]      Last office visit: 1/25/24 / 2/7/24 telemed  Due back to clinic per last office note:  3m  Date next office visit scheduled:    Future Appointments   Date Time Provider Department Center   10/30/2024  1:30 PM Iva Nguyen MD [facility] [place]           Last OV note recommendation:        ICD-10-CM     1. Chronic migraine w/o aura w/o status migrainosus, not intractable  G43.709         2. Primary stabbing headache  G44.85                  Chronic headache disorder mixed migraines and neck tension headache  Superimposed left temporal stabbing\" icepick\" headaches  New onset eye pressure and visual blurriness- found to have narrow angle glaucoma        CTA head was negative for acute abnormality.  CTA head and neck unremarkable there is mild tortuosity noted in the left distal carotid artery but then nothing definitive for FMD. No cerebral aneurysms     Newly diagnosed narrow angle glaucoma- following with Ophthalmology     Discussed medication options again.  Will start gabapentin 200 mg at night for the paroxysmal left temporal pain. ESR was normal.        Change Nurtec to Qulipta 60 mg daily for migraine prevention     Continue Indocin as needed for stabbing headaches and naratriptan for migraines

## 2024-09-17 DIAGNOSIS — M62.89 MUSCLE TIGHTNESS: ICD-10-CM

## 2024-09-17 RX ORDER — TIZANIDINE 2 MG/1
2 TABLET ORAL 2 TIMES DAILY PRN
Qty: 180 TABLET | Refills: 0 | Status: SHIPPED | OUTPATIENT
Start: 2024-09-17

## 2024-09-17 NOTE — TELEPHONE ENCOUNTER
Medication: TIZANIDINE 2 MG Oral Tab      Date of last refill: 6/10/24 (#180/0)  [place]     Last office visit: 2/7/24 telemedicine  Due back to clinic per last office note:  3 months (on 5/7/2024).   Date next office visit scheduled:    Future Appointments   Date Time Provider Department Center   10/30/2024  1:30 PM Iva Nguyen MD [facility] [place]           Last OV note recommendation:      Chronic headache disorder mixed migraines and neck tension headache  Superimposed left temporal stabbing\" icepick\" headaches  New onset eye pressure and visual blurriness- found to have narrow angle glaucoma        CTA head was negative for acute abnormality.  CTA head and neck unremarkable there is mild tortuosity noted in the left distal carotid artery but then nothing definitive for FMD. No cerebral aneurysms     Newly diagnosed narrow angle glaucoma- following with Ophthalmology     Discussed medication options again.  Will start gabapentin 200 mg at night for the paroxysmal left temporal pain. ESR was normal.        Change Nurtec to Qulipta 60 mg daily for migraine prevention     Continue Indocin as needed for stabbing headaches and naratriptan for migraines

## 2024-09-29 NOTE — TELEPHONE ENCOUNTER
From: Carl Alaniz  To: Bekah Castellano  Sent: 1/26/2024 11:02 AM CST  Subject: Imaging test    I saw you and Dr Reyes last year about neck pain and I had a procedure with Dr Reyes. I had an open order to get imaging of my neck that I didn’t get done but I went to ER this week for a migraine and a scan was done including my neck. I hope you can use these images. Please pass the results along to Dr. Reyes and well and let me know if I need to do anything else with your office.   
No

## 2024-10-01 ENCOUNTER — PATIENT MESSAGE (OUTPATIENT)
Dept: NEUROLOGY | Facility: CLINIC | Age: 69
End: 2024-10-01

## 2024-10-01 DIAGNOSIS — G43.701 CHRONIC MIGRAINE WITHOUT AURA WITH STATUS MIGRAINOSUS, NOT INTRACTABLE: ICD-10-CM

## 2024-10-01 RX ORDER — NARATRIPTAN 2.5 MG/1
2.5 TABLET ORAL AS NEEDED
Qty: 8 TABLET | Refills: 0 | Status: SHIPPED | OUTPATIENT
Start: 2024-10-01

## 2024-10-01 RX ORDER — NARATRIPTAN 2.5 MG/1
2.5 TABLET ORAL AS NEEDED
Qty: 8 TABLET | Refills: 0 | Status: CANCELLED | OUTPATIENT
Start: 2024-10-01

## 2024-10-01 NOTE — TELEPHONE ENCOUNTER
Medication: Naratriptan     Date of last refill: 09/09/2024 (#8/ 0)  [place]     Last office visit: 01/25/2024  Due back to clinic per last office note:  3 month  Date next office visit scheduled:    Future Appointments   Date Time Provider Department Center   10/30/2024  1:30 PM Iva Nguyen MD [facility]   11/4/2024 11:50 AM Leo Burton MD [facility]           Last OV note recommendation:    Chronic headache disorder mixed migraines and neck tension headache  Superimposed left temporal stabbing\" icepick\" headaches  New onset eye pressure and visual blurriness     CTA head was negative for acute abnormality.  CTA head and neck unremarkable there is mild tortuosity noted in the left distal carotid artery but then nothing definitive for FMD. No cerebral aneurysms     Referral to ophthalmology for evaluation     Patient had tried multiple preventive agents for migraines including CGRP monthly injections and Botox.  Currently she is on amitriptyline 75 mg for both headaches and fibromyalgia  On medication review it was noted that patient is still taking estrogen and has been on it for many years advised to wean it off after discussion with her gynecologist  4.  Start venlafaxine ER 37.5 mg in the morning for headache prophylaxis/chronic pain and this will also help depression and hot flashes  Discussion given amitriptyline on board     5.  Continue Indocin as needed for stabbing headaches and naratriptan for migraines  Nurtec was not covered by insurance

## 2024-10-01 NOTE — TELEPHONE ENCOUNTER
From: Carl Alaniz  To: Leo Burton  Sent: 10/1/2024 11:25 AM CDT  Subject: Appointment     I will be at [facility] location tomorrow for an appointment with another doctor at 2:30 if a cancellation comes available tomorrow before or after 2:30 maybe you can squeeze me in?   Zak Alaniz  [phone]

## 2024-10-02 ENCOUNTER — PATIENT MESSAGE (OUTPATIENT)
Dept: FAMILY MEDICINE CLINIC | Facility: CLINIC | Age: 69
End: 2024-10-02

## 2024-10-03 NOTE — TELEPHONE ENCOUNTER
From: Carl Alaniz  To: Olga Lidia Romero  Sent: 10/2/2024 2:10 PM CDT  Subject: Stomach upset    Hi we went out for sushi on Saturday and my stomach has been upset with diarrhea. Is there anything I can take in case it’s food poisoning

## 2024-10-04 ENCOUNTER — LAB ENCOUNTER (OUTPATIENT)
Dept: LAB | Age: 69
End: 2024-10-04
Attending: Other
Payer: MEDICARE

## 2024-10-04 DIAGNOSIS — Z51.81 THERAPEUTIC DRUG MONITORING: ICD-10-CM

## 2024-10-04 DIAGNOSIS — F41.9 ANXIETY DISORDER, UNSPECIFIED TYPE: ICD-10-CM

## 2024-10-04 DIAGNOSIS — E78.2 MIXED HYPERLIPIDEMIA: ICD-10-CM

## 2024-10-04 DIAGNOSIS — R53.83 FATIGUE, UNSPECIFIED TYPE: ICD-10-CM

## 2024-10-04 DIAGNOSIS — I25.10 CORONARY ATHEROSCLEROSIS OF NATIVE CORONARY ARTERY: Primary | ICD-10-CM

## 2024-10-04 LAB
ALT SERPL-CCNC: 13 U/L
AST SERPL-CCNC: 15 U/L (ref ?–34)
BASOPHILS # BLD AUTO: 0.04 X10(3) UL (ref 0–0.2)
BASOPHILS NFR BLD AUTO: 0.6 %
CHOLEST SERPL-MCNC: 228 MG/DL (ref ?–200)
EOSINOPHIL # BLD AUTO: 0.07 X10(3) UL (ref 0–0.7)
EOSINOPHIL NFR BLD AUTO: 1 %
ERYTHROCYTE [DISTWIDTH] IN BLOOD BY AUTOMATED COUNT: 13.1 %
FOLATE SERPL-MCNC: 10.8 NG/ML (ref 5.4–?)
HCT VFR BLD AUTO: 40.3 %
HDLC SERPL-MCNC: 68 MG/DL (ref 40–59)
HGB BLD-MCNC: 13.3 G/DL
IMM GRANULOCYTES # BLD AUTO: 0.02 X10(3) UL (ref 0–1)
IMM GRANULOCYTES NFR BLD: 0.3 %
LDLC SERPL CALC-MCNC: 122 MG/DL (ref ?–100)
LYMPHOCYTES # BLD AUTO: 2.27 X10(3) UL (ref 1–4)
LYMPHOCYTES NFR BLD AUTO: 32 %
MCH RBC QN AUTO: 29.2 PG (ref 26–34)
MCHC RBC AUTO-ENTMCNC: 33 G/DL (ref 31–37)
MCV RBC AUTO: 88.4 FL
MONOCYTES # BLD AUTO: 0.56 X10(3) UL (ref 0.1–1)
MONOCYTES NFR BLD AUTO: 7.9 %
NEUTROPHILS # BLD AUTO: 4.14 X10 (3) UL (ref 1.5–7.7)
NEUTROPHILS # BLD AUTO: 4.14 X10(3) UL (ref 1.5–7.7)
NEUTROPHILS NFR BLD AUTO: 58.2 %
NONHDLC SERPL-MCNC: 160 MG/DL (ref ?–130)
PLATELET # BLD AUTO: 298 10(3)UL (ref 150–450)
RBC # BLD AUTO: 4.56 X10(6)UL
T4 FREE SERPL-MCNC: 1.1 NG/DL (ref 0.8–1.7)
TRIGL SERPL-MCNC: 219 MG/DL (ref 30–149)
TSI SER-ACNC: 1.15 MIU/ML (ref 0.55–4.78)
VIT B12 SERPL-MCNC: 346 PG/ML (ref 211–911)
VLDLC SERPL CALC-MCNC: 39 MG/DL (ref 0–30)
WBC # BLD AUTO: 7.1 X10(3) UL (ref 4–11)

## 2024-10-04 PROCEDURE — 84460 ALANINE AMINO (ALT) (SGPT): CPT

## 2024-10-04 PROCEDURE — 84450 TRANSFERASE (AST) (SGOT): CPT

## 2024-10-04 PROCEDURE — 82746 ASSAY OF FOLIC ACID SERUM: CPT

## 2024-10-04 PROCEDURE — 36415 COLL VENOUS BLD VENIPUNCTURE: CPT

## 2024-10-04 PROCEDURE — 80061 LIPID PANEL: CPT

## 2024-10-04 PROCEDURE — 82607 VITAMIN B-12: CPT

## 2024-10-04 PROCEDURE — 85025 COMPLETE CBC W/AUTO DIFF WBC: CPT

## 2024-10-04 PROCEDURE — 84443 ASSAY THYROID STIM HORMONE: CPT

## 2024-10-04 PROCEDURE — 84439 ASSAY OF FREE THYROXINE: CPT

## 2024-10-04 NOTE — TELEPHONE ENCOUNTER
Left voicemail asking patient to call the office and speak with a nurse about her symptoms if she still needs help.

## 2024-10-10 ENCOUNTER — OFFICE VISIT (OUTPATIENT)
Dept: NEUROLOGY | Facility: CLINIC | Age: 69
End: 2024-10-10
Payer: MEDICARE

## 2024-10-10 ENCOUNTER — TELEPHONE (OUTPATIENT)
Dept: NEUROLOGY | Facility: CLINIC | Age: 69
End: 2024-10-10

## 2024-10-10 VITALS
DIASTOLIC BLOOD PRESSURE: 76 MMHG | BODY MASS INDEX: 29 KG/M2 | WEIGHT: 179 LBS | RESPIRATION RATE: 18 BRPM | HEART RATE: 90 BPM | SYSTOLIC BLOOD PRESSURE: 124 MMHG | OXYGEN SATURATION: 98 %

## 2024-10-10 DIAGNOSIS — G43.701 CHRONIC MIGRAINE WITHOUT AURA WITH STATUS MIGRAINOSUS, NOT INTRACTABLE: Primary | ICD-10-CM

## 2024-10-10 DIAGNOSIS — M47.812 OSTEOARTHRITIS OF CERVICAL SPINE, UNSPECIFIED SPINAL OSTEOARTHRITIS COMPLICATION STATUS: ICD-10-CM

## 2024-10-10 DIAGNOSIS — M54.81 CERVICO-OCCIPITAL NEURALGIA: ICD-10-CM

## 2024-10-10 PROCEDURE — 99214 OFFICE O/P EST MOD 30 MIN: CPT | Performed by: OTHER

## 2024-10-10 NOTE — PATIENT INSTRUCTIONS
After your visit at the [place] office  today, please direct any follow up questions or medication needs to the staff in our [place] office so that your concerns may be promptly addressed.  We are available through [employer] or at the numbers below:    The phone number is:   [phone], option 1    The fax number is:  [phone]    Your pharmacy should also send any requests electronically to the [place] office.       Refill policies:    Allow 2-3 business days for refills; controlled substances may take longer.  Contact your pharmacy at least 5 days prior to running out of medication and have them send an electronic request or submit request through the “request refill” option in your [employer] account.  Refills are not addressed on weekends; covering physicians do not authorize routine medications on weekends.  No narcotics or controlled substances are refilled after noon on Fridays or by on call physicians.  By law, narcotics must be electronically prescribed.  A 30 day supply with no refills is the maximum allowed.  If your prescription is due for a refill, you may be due for a follow up appointment.  To best provide you care, patients receiving routine medications need to be seen at least once a year.  Patients receiving narcotic/controlled substance medications need to be seen at least once every 3 months.  In the event that your preferred pharmacy does not have the requested medication in stock (e.g. Backordered), it is your responsibility to find another pharmacy that has the requested medication available.  We will gladly send a new prescription to that pharmacy at your request.    Scheduling Tests:    If your physician has ordered radiology tests such as MRI or CT scans, please contact Central Scheduling at [phone] right away to schedule the test.  Once scheduled, the [facility] Centralized Referral Team will work with your insurance carrier to obtain pre-certification or prior authorization.   Depending on your insurance carrier, approval may take 3-10 days.  It is highly recommended patients assure they have received an authorization before having a test performed.  If test is done without insurance authorization, patient may be responsible for the entire amount billed.      Precertification and Prior Authorizations:  If your physician has recommended that you have a procedure or additional testing performed the [facility] Centralized Referral Team will contact your insurance carrier to obtain pre-certification or prior authorization.    You are strongly encouraged to contact your insurance carrier to verify that your procedure/test has been approved and is a COVERED benefit.  Although the [facility] Centralized Referral Team does its due diligence, the insurance carrier gives the disclaimer that \"Although the procedure is authorized, this does not guarantee payment.\"    Ultimately the patient is responsible for payment.   Thank you for your understanding in this matter.  Paperwork Completion:  If you require FMLA or disability paperwork for your recovery, please make sure to either drop it off or have it faxed to our office at [phone]. Be sure the form has your name and date of birth on it.  The form will be faxed to our Forms Department and they will complete it for you.  There is a 25$ fee for all forms that need to be filled out.  Please be aware there is a 10-14 day turnaround time.  You will need to sign a release of information (TAMIKO) form if your paperwork does not come with one.  You may call the Forms Department with any questions at [phone].  Their fax number is [phone].

## 2024-10-28 ENCOUNTER — NURSE TRIAGE (OUTPATIENT)
Dept: FAMILY MEDICINE CLINIC | Facility: CLINIC | Age: 69
End: 2024-10-28

## 2024-10-28 NOTE — TELEPHONE ENCOUNTER
Action Requested: Summary for Provider     []  Critical Lab, Recommendations Needed  [] Need Additional Advice  [x]   ALYSSAI    []   Need Orders  [] Need Medications Sent to Pharmacy  []  Other     SUMMARY: Called and spoke to pt. Patient thought she was scheduling appointment with Dr. Marcus with pain clinic, has seen them in the past for nerve pain. Informed pt she is scheduled with Dr. Marcus in our office. Patient would like to cancel that appointment and will call pain clinic to schedule. Patient also following with neuro for migraines.     Patient is scheduled 10/31 with Dr. Romero for annual, also wanted to address intermittent shortness of breath she has been having with exertion over past few months. Denies chest pain/ any other sxs. Only occurs with activity, such as going up the stairs. UC/ER warnings provided prior to appointment.     IRVING Romero     Reason for call: Acute and Appt Request  Onset: months       Reason for Disposition   MILD longstanding difficulty breathing (e.g., speaks in phrases, SOB even at rest, pulse 100-120) and SAME as normal    Protocols used: Breathing Difficulty-A-OH

## 2024-10-28 NOTE — TELEPHONE ENCOUNTER
Patient scheduled appointment online with , pcp is  for \"Headache and nerve pain relief \"      Please triage       Future Appointments   Date Time Provider Department Center   10/29/2024  1:45 PM Truong Marcus MD [facility]   10/31/2024  1:00 PM Olga Lidia Romero,  [facility]

## 2024-10-29 ENCOUNTER — OFFICE VISIT (OUTPATIENT)
Dept: PAIN CLINIC | Facility: CLINIC | Age: 69
End: 2024-10-29
Payer: MEDICARE

## 2024-10-29 VITALS — OXYGEN SATURATION: 95 % | HEART RATE: 86 BPM | DIASTOLIC BLOOD PRESSURE: 62 MMHG | SYSTOLIC BLOOD PRESSURE: 108 MMHG

## 2024-10-29 DIAGNOSIS — G43.001 MIGRAINE WITHOUT AURA AND WITH STATUS MIGRAINOSUS, NOT INTRACTABLE: ICD-10-CM

## 2024-10-29 DIAGNOSIS — M25.50 CHRONIC JOINT PAIN: ICD-10-CM

## 2024-10-29 DIAGNOSIS — M50.30 DEGENERATION OF CERVICAL DISC WITHOUT MYELOPATHY: ICD-10-CM

## 2024-10-29 DIAGNOSIS — G89.29 CHRONIC JOINT PAIN: ICD-10-CM

## 2024-10-29 DIAGNOSIS — M47.812 CERVICAL SPONDYLOSIS: Primary | ICD-10-CM

## 2024-10-29 PROBLEM — I77.3 FIBROMUSCULAR DYSPLASIA: Status: ACTIVE | Noted: 2024-03-07

## 2024-10-29 PROBLEM — I77.3 FIBROMUSCULAR DYSPLASIA (HCC): Status: ACTIVE | Noted: 2024-03-07

## 2024-10-29 PROCEDURE — 99214 OFFICE O/P EST MOD 30 MIN: CPT | Performed by: NURSE PRACTITIONER

## 2024-10-29 NOTE — PROGRESS NOTES
Patient presents in office today with reported pain in left side of neck and head/face, sometimes pain zaps down her arm    Current pain level reported = 0/10, just took excedrin which relieves pain for around 4 hours, then wears off    Last reported dose of n/a      Narcotic Contract renewal n/a    Urine Drug screen n/a

## 2024-10-29 NOTE — PATIENT INSTRUCTIONS

## 2024-10-29 NOTE — PROGRESS NOTES
HPI:   Carl Alaniz presents with complaints of left-sided neck pain radiating into the left upper trapezius region and left side of the skull stabbing type pain: Is not her chronic migraine symptoms  .  Patient denies any radiating symptoms down her upper extremities.  Patient denies any fever chills injuries or events.  Patient denies any symptoms in her lower extremities.  Patient denies any loss of bowel or bladder control      The pain is described as moderate aching, stabbing, grabbing, soreness that is constant .  The patient’s activity level has remained the same since last visit.  The pain is worst in the early morning, in the late evening.    Changes in condition/history since last visit: Patient continues to work with her neurologist who is attempting to get Botox authorized for her migraine headaches    Last procedure: Left C5-6 C6-7 facet injections    date: October 31, 2023    Percentage of relief experienced from the procedure: Patient cannot recall %    Duration of the relief: Not applicable    The following activities will increase the patient’s pain: Reading, working, cervical flexion, playing the piano    The following activities decrease the patient’s pain: medications, heat, stretching, changing position    Functional Assessment: Patient reports that they are able to complete all of their ADL's such as eating, bathing, using the toilet, dressing and getting up from a bed or a chair independently.    A comprehensive 10 point review of systems was completed.  Pertinent positives and negatives noted in the the HPI.     Past Medical History:   Diagnosis Date    Abnormal Holter monitor finding 10/20/2005    rare PAC's and PVC's    Abnormal MRI, cervical spine 02/05/2008    uncovertebral degenerative change is suggested, right-sided C2 C3, and left sided C4 C5 with disc bulging noted also at the C4 C5 level.  small central protrusions effacing the subarachnoid space C5 C6 and C6 C7    Chest pain  02/2002    with exercises    Chronic fatigue syndrome     Colitis 04/2006    sigmoid erythema consistent with nonspecific colitis    Decreased sexual desire 02/1998    Depression with anxiety     psychiatry: Dr. Iva Nguyen    Diverticulosis 04/2006    scattered, minimal    Endometriosis     s/p ablation therapy    Fibromyalgia     History of colon polyps     GI: Dr. Abisai Bay    History of femur fracture 04/18/2012    s/p closed reduction percutaneous screw fixation of right femoral neck fracture  internal fixation of the right hip    HTN (hypertension)     Hyperlipidemia     IBD (inflammatory bowel disease)     IBS (irritable bowel syndrome) 03/17/2006    chronic, recurrent. GI: Dr. Abisai Bay    Intervertebral disc protrusion 04/06/2005    L5/S1 small central protrusion effacing the thecal sax with a small annular fissure in the posterior annular fibers    Migraines     neuro: Dr. Leo Burton    Nephrolithiasis     Osteopenia 05/31/2012    Palpitations 02/1998    likely anxiety related to dystrophic nails secondary to mechanical injury     Postmenopausal HRT (hormone replacement therapy)     Seasonal allergies     Vitamin D deficiency 03/04/2019        Past Surgical History:   Procedure Laterality Date    Cholecystectomy  11/10    bile leak, sepsis, pancreatitis    Colonoscopy  10/16, 4/06, 9/00, 8/97 2000 polyps, 2006 no polyps    Colonoscopy  11/2019    Colonoscopy N/A 11/14/2019    Procedure: COLONOSCOPY, POSSIBLE BIOPSY, POSSIBLE POLYPECTOMY 55678;  Surgeon: Abisai Bay MD;  Location: [facility]    Egd scope  2000, 3/18    normal, empiric dilation    Endometr ablate, thermal  2000    Ercp,diagnostic  2/11    Laparoscopy,lysis adhesns      endometirosis    Leg/ankle surgery proc unlisted  4/18/2012    closed reduction percutaneous screw fixation of right femoral neck fracture  internal fixation of the right hip    Other  April 2016    Cervical Facet injection, Ganglion block     Removal gallbladder      Tubal ligation          Current Medications:  Current Outpatient Medications   Medication Sig Dispense Refill    aspirin-acetaminophen-caffeine 250-250-65 MG Oral Tab Take 1 tablet by mouth every 4 (four) hours as needed for Pain.      amitriptyline 75 MG Oral Tab Take 1 tablet (75 mg total) by mouth nightly. 30 tablet 2    Naratriptan HCl 2.5 MG Oral Tab Take 1 tablet (2.5 mg total) by mouth as needed. 8 tablet 0    TIZANIDINE 2 MG Oral Tab TAKE 1 TABLET BY MOUTH 2 TIMES DAILY AS NEEDED. 180 tablet 0    ezetimibe 10 MG Oral Tab Take 1 tablet (10 mg total) by mouth every evening.      LINZESS 145 MCG Oral Cap Take 1 capsule by mouth daily.      Nerve Stimulator (NERIVIO) Does not apply Device USE ONE 45 MINUTE TREATMENT EVERY OTHER DAY FOR PREVENTION OR AT ONSET OF MIGRAINE AS DIRECTED      NURTEC 75 MG Oral Tablet Dispersible Place 1 tablet onto the tongue every other day.      ALPRAZolam 0.5 MG Oral Tab Take 0.5-1 tablets (0.25-0.5 mg total) by mouth daily as needed for Anxiety. 30 tablet 2    clonazePAM 0.5 MG Oral Tab Take 1.5-2 tablets (0.75-1 mg total) by mouth nightly as needed for Anxiety. 60 tablet 2    valACYclovir 500 MG Oral Tab       REPATHA SURECLICK 140 MG/ML Subcutaneous Solution Auto-injector Repatha SureClick 140 mg/mL subcutaneous pen injector, [RxNorm: 9944406]      gabapentin 100 MG Oral Cap Take 2 capsules (200 mg total) by mouth nightly.      gabapentin 300 MG Oral Cap Take 1 capsule (300 mg total) by mouth in the morning and 1 capsule (300 mg total) before bedtime. Take 300 mg nightly x 3 days then 300 mg twice daily. 60 capsule 2    Atogepant 60 MG Oral Tab Take 60 mg by mouth daily. 30 tablet 5    Cholecalciferol (VITAMIN D3) 250 MCG (32957 UT) Oral Cap Take 1 capsule by mouth daily. 3000 international unit      amLODIPine 5 MG Oral Tab Take 1 tablet (5 mg total) by mouth daily.      docusate sodium (DULCOLAX PINK STOOL SOFTENER) 100 MG Oral Cap Take 1 capsule  (100 mg total) by mouth 2 (two) times daily. 30 capsule 0    dicyclomine 20 MG Oral Tab Take 1 tablet (20 mg total) by mouth 4 (four) times daily.      estradiol 1 MG Oral Tab       albuterol (PROAIR HFA) 108 (90 Base) MCG/ACT Inhalation Aero Soln Inhale 2 puffs into the lungs every 6 (six) hours as needed for Wheezing. 18 g 1    Esomeprazole Magnesium 20 MG Oral Capsule Delayed Release Take 2 capsules (40 mg total) by mouth nightly.      MULTIVITAMIN TAB/CAP Take 1 tablet by mouth daily.        Patient requires assistance with: No assistance required    Have you recently had any feelings of harming yourself or others? The patient's response was no.    Physical Exam:   /62 (BP Location: Left arm, Patient Position: Sitting, Cuff Size: adult)   Pulse 86   SpO2 95%   VAS Pain Score: 0 /10 due to the Excedrin that she recently took  General Appearance: Well developed, well nourished, normal build, independent body habitus, no apparent physical disabilities, well groomed    Neurological Exam: WNL-Orientation to time, place and person, normal mood & effect, normal concentration & attention span  Inspection:   Coordination:  Well coordinated; able to engage in rapid alternating movements bilateral upper extremities    ROM Cervical Spine:  See chart below:  Motion Right (+ or -) Left (+ or -)   Cervical flexion - +   Cervical extension - +   Cervical lateral bending - +   Cervical rotation - +     Integument:  Skin over area of cervical spine intact; no erythema, rashes, excoriations, lesions noted    Palpation:  See chart below:  Palpation of Right (+ or -) Left (+ or -)   Cervical Facets - +   Thoracic Facets - -   Paraspinal - +   Trapezius - +   Scapula - +   + left occipital region  DTR:  DTR grossly intact throughout bilateral upper extremities, 2/4 throughout    Strength:  Strength of bilateral upper extremities grossly intact; 5/5 throughout    Sensation:  Normal sensation noted to light touch and pressure  throughout bilateral upper extremities.    Tests:  Test Right (+ or -) Left (+ or -)   Spurling - -   Maya’s Test - -   Clonus - -     ID#[id number]  Radiology/Lab Test Reviewed:   PROCEDURE:  XR CERVICAL SPINE COMPLETE W/FLEX + EXT (CPT=72052)     TECHNIQUE:  AP, lateral, obliques, coned down view, and flexion/extension views of the spine were obtained.     COMPARISON:  None.     INDICATIONS:  M50.30 Degeneration of cervical disc without myelopathy M47.812 Cervical spondylosis     PATIENT STATED HISTORY: (As transcribed by Technologist)  Pt has arthritis and has neck and bilateral shoulder pain.         FINDINGS:  No acute fracture or subluxation in the cervical spine.  Normal cervical lordosis with anatomic alignment.  Vertebral body heights are well-maintained.  Moderate degenerative disc space loss and endplate spurring most pronounced at C5-6 and  C6-7.  Mild facet arthropathy throughout the cervical spine.  No abnormal motion with flexion or extension views.  C1-2 articulation intact.  Normal mineralization.  Unremarkable soft tissues.                  Impression  CONCLUSION:  No acute fracture or subluxation in the cervical spine.  Degenerative changes as above.        LOCATION:  [facility]        Dictated by (CST): Clark Hernandez MD on 5/21/2024 at 3:55 PM      Finalized by (CST): Clark Hernandez MD on 5/21/2024 at 3:56 PM    Lab Results   Component Value Date    WBC 7.1 10/04/2024    WBC 8.1 01/24/2024    WBC 8.0 01/03/2023     Lab Results   Component Value Date    HEMOGLOBIN 13.2 08/13/2015     Lab Results   Component Value Date    .0 10/04/2024    .0 01/24/2024    .0 01/03/2023           Patient Education: Patient was advised to continue normal activities as tolerated and was advised against any form of bedrest, since recent guidelines promote and encourage physical activity for improvment of functionality and overall pain.  (Family Practice Vol. 16, No. 1, 74-54Â© [employer]  1999 ), Patient was shown interactive content, shown and explained procedure on spinal model..  Patient educated and verbalized understanding.  Medical Decision Making:   Diagnosis:    Encounter Diagnoses   Name Primary?    Cervical spondylosis Yes    Chronic joint pain     Degeneration of cervical disc without myelopathy     Migraine without aura and with status migrainosus, not intractable      Impression:   Patient is 69-year-old female with a history of migraine headaches, major depressive disorder, hypertension, hyperlipidemia, coronary artery disease, vitamin D deficiency, anxiety disorder, OCD, irritable bowel, GERD, fibromyalgia, osteopenia, postmenopausal hormone replacement therapy and left-sided cervicalgia in the setting of cervical disc degeneration most significant at C5-6 C6-7.  Patient did have facet injections in 2023 on the left side at these levels and cannot recall level of relief.  Discussed with patient completing median branch blocks at left C5-6 C6-7 and have patient monitor response during the diagnostic phase return to clinic for follow-up and if significant relief of the left-sided neck pain would repeat the block staging towards radiofrequency.    For left temporal region headache symptoms this may be result of tight muscles on the left side due to the disc degeneration and may resolve with the treatment of C5-6 C6-7 levels however we will continue to monitor these and if symptoms are more consistent with left occipital neuralgia may consider left occipital nerve block.    Patient has done acupuncture and chiropractic in the past and is open to these as well.  Will place order for acupuncture.  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    See plan below  Plan:   > #1 left C5-6 C6-7 median branch block  > Follow-up 2 weeks post block to  evaluate response  > If positive response would repeat left C5-6 although branch blocks staging towards radiofrequency  > If left sided headache persist may consider left occipital nerve block  > Continue all conservative treatment management medications and therapies as currently needed To help reduce symptoms  > Acupuncture consultation ordered: Patient provided information regarding self-pay model  Orders Placed This Encounter   Procedures    [facility] PAIN NAVIGATOR       Meds & Refills for this Visit:  Requested Prescriptions      No prescriptions requested or ordered in this encounter       Imaging & Consults:  ACUPUNCTURE [place] - INTERNAL REFERRAL    The patient indicates understanding of these issues and agrees to the plan.      ID#[id number]

## 2024-10-31 ENCOUNTER — TELEPHONE (OUTPATIENT)
Dept: PAIN CLINIC | Facility: CLINIC | Age: 69
End: 2024-10-31

## 2024-10-31 ENCOUNTER — LAB ENCOUNTER (OUTPATIENT)
Dept: LAB | Age: 69
End: 2024-10-31
Attending: FAMILY MEDICINE
Payer: MEDICARE

## 2024-10-31 ENCOUNTER — OFFICE VISIT (OUTPATIENT)
Dept: FAMILY MEDICINE CLINIC | Facility: CLINIC | Age: 69
End: 2024-10-31
Payer: MEDICARE

## 2024-10-31 VITALS
DIASTOLIC BLOOD PRESSURE: 62 MMHG | HEART RATE: 68 BPM | HEIGHT: 66 IN | OXYGEN SATURATION: 98 % | RESPIRATION RATE: 16 BRPM | WEIGHT: 175 LBS | BODY MASS INDEX: 28.13 KG/M2 | SYSTOLIC BLOOD PRESSURE: 110 MMHG | TEMPERATURE: 97 F

## 2024-10-31 DIAGNOSIS — R06.09 DOE (DYSPNEA ON EXERTION): ICD-10-CM

## 2024-10-31 DIAGNOSIS — F33.0 MDD (MAJOR DEPRESSIVE DISORDER), RECURRENT EPISODE, MILD (HCC): ICD-10-CM

## 2024-10-31 DIAGNOSIS — Z13.1 SCREENING FOR DIABETES MELLITUS: ICD-10-CM

## 2024-10-31 DIAGNOSIS — Z12.11 SCREENING FOR COLON CANCER: ICD-10-CM

## 2024-10-31 DIAGNOSIS — M79.7 FIBROMYALGIA: ICD-10-CM

## 2024-10-31 DIAGNOSIS — Z00.00 ENCOUNTER FOR ANNUAL HEALTH EXAMINATION: Primary | ICD-10-CM

## 2024-10-31 DIAGNOSIS — I70.0 ABDOMINAL AORTIC ATHEROSCLEROSIS (HCC): ICD-10-CM

## 2024-10-31 DIAGNOSIS — Z00.00 ENCOUNTER FOR ANNUAL HEALTH EXAMINATION: ICD-10-CM

## 2024-10-31 DIAGNOSIS — R94.31 ABNORMAL EKG: ICD-10-CM

## 2024-10-31 DIAGNOSIS — Z13.1 SCREENING FOR DIABETES MELLITUS (DM): ICD-10-CM

## 2024-10-31 DIAGNOSIS — I10 ESSENTIAL HYPERTENSION, BENIGN: ICD-10-CM

## 2024-10-31 DIAGNOSIS — E78.2 MIXED HYPERLIPIDEMIA: ICD-10-CM

## 2024-10-31 DIAGNOSIS — Z23 NEED FOR VACCINATION: ICD-10-CM

## 2024-10-31 DIAGNOSIS — J44.89 ASTHMA WITH COPD (CHRONIC OBSTRUCTIVE PULMONARY DISEASE) (HCC): ICD-10-CM

## 2024-10-31 DIAGNOSIS — G43.009 MIGRAINE WITHOUT AURA AND WITHOUT STATUS MIGRAINOSUS, NOT INTRACTABLE: ICD-10-CM

## 2024-10-31 LAB
ALBUMIN SERPL-MCNC: 4.5 G/DL (ref 3.2–4.8)
ALBUMIN/GLOB SERPL: 1.6 {RATIO} (ref 1–2)
ALP LIVER SERPL-CCNC: 32 U/L
ALT SERPL-CCNC: 24 U/L
ANION GAP SERPL CALC-SCNC: 7 MMOL/L (ref 0–18)
AST SERPL-CCNC: 23 U/L (ref ?–34)
BILIRUB SERPL-MCNC: 0.3 MG/DL (ref 0.2–1.1)
BUN BLD-MCNC: 12 MG/DL (ref 9–23)
CALCIUM BLD-MCNC: 10.2 MG/DL (ref 8.7–10.4)
CHLORIDE SERPL-SCNC: 101 MMOL/L (ref 98–112)
CO2 SERPL-SCNC: 28 MMOL/L (ref 21–32)
CREAT BLD-MCNC: 0.9 MG/DL
EGFRCR SERPLBLD CKD-EPI 2021: 70 ML/MIN/1.73M2 (ref 60–?)
EST. AVERAGE GLUCOSE BLD GHB EST-MCNC: 120 MG/DL (ref 68–126)
FASTING STATUS PATIENT QL REPORTED: NO
GLOBULIN PLAS-MCNC: 2.9 G/DL (ref 2–3.5)
GLUCOSE BLD-MCNC: 80 MG/DL (ref 70–99)
HBA1C MFR BLD: 5.8 % (ref ?–5.7)
OSMOLALITY SERPL CALC.SUM OF ELEC: 281 MOSM/KG (ref 275–295)
POTASSIUM SERPL-SCNC: 3.8 MMOL/L (ref 3.5–5.1)
PROT SERPL-MCNC: 7.4 G/DL (ref 5.7–8.2)
SODIUM SERPL-SCNC: 136 MMOL/L (ref 136–145)

## 2024-10-31 PROCEDURE — 80053 COMPREHEN METABOLIC PANEL: CPT

## 2024-10-31 PROCEDURE — 83036 HEMOGLOBIN GLYCOSYLATED A1C: CPT

## 2024-10-31 PROCEDURE — 36415 COLL VENOUS BLD VENIPUNCTURE: CPT

## 2024-10-31 NOTE — TELEPHONE ENCOUNTER
Prior authorization request completed for: left C5/6,C6/7 MBB  Authorization # [id number]  Authorization dates: 10/31/24-4/29/25  CPT codes approved: 00101,37610  Number of visits/dates of service approved: 1  Physician: claire  Location: [facility]     Patient can be scheduled. Routed to Navigator.

## 2024-10-31 NOTE — PROGRESS NOTES
Subjective:   Carl Alaniz is a 69 year old female who presents for a MA AHA (Medicare Advantage Annual Health Assessment) and scheduled follow up of multiple significant but stable problems and acute complicated new problem.   Chronic conditions are stable. Compliant with medications as per specialists.  States she has never smoked but had been exposed to second hand smoke for 25yrs. Over the past 6mo, she had noticed she is having a harder time with going up and down her stairs. Even after taking a shower, she feels tired and short of breath. Denies any cp/p, n/v, diaphoresis or LE edema. HL, has been unable to tolerate statins due to muscle aches. Has been following with cards and taking Zetia daily. Was given rx for Repatha but insurance is not covering it and unable to afford the copay.      History/Other:   Fall Risk Assessment:   She has been screened for Falls and is High Risk. Fall Prevention information provided to patient in After Visit Summary.    Do you feel unsteady when standing or walking?: Yes  Do you worry about falling?: Yes  Have you fallen in the past year?: Yes  How many times have you fallen?: 1  Were you injured?: No     Cognitive Assessment:   Abnormal  What day of the week is this?: Correct  What month is it?: Correct  What year is it?: Correct  Recall \"Ball\": Correct  Recall \"Flag\": Incorrect  Recall \"Tree\": Correct      Functional Ability/Status:   Carl Alaniz has some abnormal functions as listed below:  She has Dressing and/or Bathing issues based on screening of functional status.  Difficulty dressing or bathing?: Yes  Bathing or Showering: Able without help  Dressing: Able without help  She has difficulties Affording Meds based on screening of functional status. She has Vision problems based on screening of functional status. She has Walking problems based on screening of functional status. She has problems with Daily Activities based on screening of functional status. She  has problems with Memory based on screening of functional status.       Depression Screening (PHQ):  PHQ-2 SCORE: 2  , done 10/31/2024   Feeling down, depressed, or hopeless: 2        Advanced Directives:   She does NOT have a Living Will. [Do you have a living will?: No]  She does NOT have a Power of  for Health Care. [Do you have a healthcare power of ?: No]  Discussed Advance Care Planning with patient (and family/surrogate if present). Standard forms made available to patient in After Visit Summary.      Patient Active Problem List   Diagnosis    Seasonal allergies    Migraines    IBS (irritable bowel syndrome)    Fibromyalgia    HTN (hypertension)    Diverticulosis    Postmenopausal HRT (hormone replacement therapy)    Osteopenia    Chronic migraine without aura with status migrainosus, not intractable    Anxiety disorder    Gastroesophageal reflux disease without esophagitis    History of colon polyps    Mixed hyperlipidemia    Vitamin D deficiency    Post-menopausal bleeding    Urinary, incontinence, stress female    Obsessive-compulsive disorder    Coronary atherosclerosis of native coronary artery    MDD (major depressive disorder), recurrent episode, mild (HCC)    MDD (major depressive disorder), recurrent, in partial remission (HCC)    Fibromuscular dysplasia (HCC)    Asthma with COPD (chronic obstructive pulmonary disease) (HCC)     Allergies:  She is allergic to codeine, morphine, sulfa antibiotics, statins, alc-benzyl alc-sulfamethoxazole-trimethoprim, amoxicillin-pot clavulanate, bactrim [sulfamethoxazole w/trimethoprim], cefdinir, cefdinir, ciprofloxacin, citric acid, clarithromycin, dairy products, dust, hydrocodone, lactose, other, pain relief [goodys extra strength], peppermint flavor, reglan [metoclopramide], seasonal, tomato, and zomig [zolmitriptan].    Current Medications:  Outpatient Medications Marked as Taking for the 10/31/24 encounter (Office Visit) with Olga Lidia Romero  Jennifer, DO   Medication Sig    aspirin-acetaminophen-caffeine 250-250-65 MG Oral Tab Take 1 tablet by mouth every 4 (four) hours as needed for Pain.    amitriptyline 75 MG Oral Tab Take 1 tablet (75 mg total) by mouth nightly.    Naratriptan HCl 2.5 MG Oral Tab Take 1 tablet (2.5 mg total) by mouth as needed.    TIZANIDINE 2 MG Oral Tab TAKE 1 TABLET BY MOUTH 2 TIMES DAILY AS NEEDED.    ezetimibe 10 MG Oral Tab Take 1 tablet (10 mg total) by mouth every evening.    LINZESS 145 MCG Oral Cap Take 1 capsule by mouth daily.    ALPRAZolam 0.5 MG Oral Tab Take 0.5-1 tablets (0.25-0.5 mg total) by mouth daily as needed for Anxiety.    clonazePAM 0.5 MG Oral Tab Take 1.5-2 tablets (0.75-1 mg total) by mouth nightly as needed for Anxiety.    valACYclovir 500 MG Oral Tab     Atogepant 60 MG Oral Tab Take 60 mg by mouth daily.    Cholecalciferol (VITAMIN D3) 250 MCG (93478 UT) Oral Cap Take 1 capsule by mouth daily. 3000 international unit    amLODIPine 5 MG Oral Tab Take 1 tablet (5 mg total) by mouth daily.    docusate sodium (DULCOLAX PINK STOOL SOFTENER) 100 MG Oral Cap Take 1 capsule (100 mg total) by mouth 2 (two) times daily.    dicyclomine 20 MG Oral Tab Take 1 tablet (20 mg total) by mouth 4 (four) times daily.    estradiol 1 MG Oral Tab     albuterol (PROAIR HFA) 108 (90 Base) MCG/ACT Inhalation Aero Soln Inhale 2 puffs into the lungs every 6 (six) hours as needed for Wheezing.    Esomeprazole Magnesium 20 MG Oral Capsule Delayed Release Take 2 capsules (40 mg total) by mouth nightly.    MULTIVITAMIN TAB/CAP Take 1 tablet by mouth daily.       Medical History:  She  has a past medical history of Abnormal Holter monitor finding (10/20/2005), Abnormal MRI, cervical spine (02/05/2008), Chest pain (02/2002), Chronic fatigue syndrome, Colitis (04/2006), Decreased sexual desire (02/1998), Depression with anxiety, Diverticulosis (04/2006), Endometriosis, Fibromyalgia, History of colon polyps, History of femur fracture  (04/18/2012), HTN (hypertension), Hyperlipidemia, IBD (inflammatory bowel disease), IBS (irritable bowel syndrome) (03/17/2006), Intervertebral disc protrusion (04/06/2005), Migraines, Nephrolithiasis, Osteopenia (05/31/2012), Palpitations (02/1998), Postmenopausal HRT (hormone replacement therapy), Seasonal allergies, and Vitamin D deficiency (03/04/2019).  Surgical History:  She  has a past surgical history that includes cholecystectomy (11/10); endometr ablate, thermal (2000); laparoscopy,lysis adhesns; colonoscopy (10/16, 4/06, 9/00, 8/97); tubal ligation; leg/ankle surgery proc unlisted (4/18/2012); other (April 2016); ercp,diagnostic (2/11); egd scope (2000, 3/18); removal gallbladder; colonoscopy (11/2019); and colonoscopy (N/A, 11/14/2019).   Family History:  Her family history includes Breast Cancer (age of onset: 20) in her paternal aunt; Cancer in an other family member; Colon Cancer in her maternal grandmother; Lung Disorder in her mother.  Social History:  She  reports that she has never smoked. She has never used smokeless tobacco. She reports current alcohol use. She reports that she does not use drugs.    Tobacco:  She has never smoked tobacco.    CAGE Alcohol Screen:   CAGE screening score of 0 on 10/31/2024, showing low risk of alcohol abuse.      Patient Care Team:  Olga Lidia Romero DO as PCP - General (Family Medicine)  Mckenzie Zhang MD as Consulting Physician (NEUROLOGY)  Mckenzie Marcus MD as Consulting Physician (Anesthesiology)  Tesha Santiago APN (PAIN MANAGEMENT)  Lilia Luo MD as Consulting Physician (ENDOCRINOLOGY)  Abisai Bay MD (GASTROENTEROLOGY)  Leo Burton MD as Consulting Physician (NEUROLOGY)  Kelly Cruz APRN (Nurse Practitioner Family)    Review of Systems  GENERAL: feels well otherwise  SKIN: denies any unusual skin lesions  EYES: denies blurred vision or double vision  HEENT: denies nasal congestion, sinus pain or ST  LUNGS: + shortness  of breath with exertion  CARDIOVASCULAR: denies chest pain on exertion  GI: denies abdominal pain, denies heartburn  : denies dysuria, vaginal discharge or itching   MUSCULOSKELETAL: denies back pain  NEURO: +hx of migraine headaches  PSYCHE: + depression or anxiety  HEMATOLOGIC: denies hx of anemia  ENDOCRINE: denies thyroid history  ALL/ASTHMA: denies hx of allergy or asthma    Objective:   Physical Exam  General Appearance:  Alert, cooperative, no distress, appears stated age   Head:  Normocephalic, without obvious abnormality, atraumatic   Eyes:  PERRL, EOM's intact both eyes   Ears:  Normal TM's and external ear canals, both ears   Nose: Nares normal    Throat: Lips, mucosa, and tongue normal; teeth and gums normal   Neck: Supple, symmetrical, trachea midline, no adenopathy    Back:   Symmetric, no curvature, ROM normal, no CVA tenderness   Lungs:   Clear to auscultation bilaterally, respirations unlabored   Heart:  Regular rate and rhythm, S1 and S2 normal    Abdomen:   Soft, non-tender, bowel sounds active all four quadrants,  no masses, no organomegaly   Pelvic: Deferred   Extremities: Extremities normal, atraumatic, no cyanosis or edema   Pulses: 2+ and symmetric   Skin: Skin color, texture, turgor normal, no rashes or lesions   Lymph nodes: Cervical nodes normal   Neurologic: Normal       /62   Pulse 68   Temp 97.3 °F (36.3 °C) (Temporal)   Resp 16   Ht 5' 6\" (1.676 m)   Wt 175 lb (79.4 kg)   SpO2 98%   BMI 28.25 kg/m²  Estimated body mass index is 28.25 kg/m² as calculated from the following:    Height as of this encounter: 5' 6\" (1.676 m).    Weight as of this encounter: 175 lb (79.4 kg).    Medicare Hearing Assessment:   Hearing Screening    Screening Method: Other         Visual Acuity:   Right Eye Visual Acuity: Corrected Right Eye Chart Acuity: 20/25   Left Eye Visual Acuity: Corrected Left Eye Chart Acuity: 20/25   Both Eyes Visual Acuity: Corrected Both Eyes Chart Acuity: 20/25    Able To Tolerate Visual Acuity: Yes        Assessment & Plan:   Carl Alaniz is a 69 year old female who presents for a Medicare Assessment.     1. Encounter for annual health examination (Primary)  - reviewed anticipatory guidance based on age  -     Comp Metabolic Panel (14); Future; Expected date: 10/31/2024  -     Hemoglobin A1C; Future; Expected date: 10/31/2024.  2. Screening for colon cancer  -     Referral to GI for Colon Cancer Screening/Colonoscopy    3. Screening for diabetes mellitus  -     Comp Metabolic Panel (14); Future; Expected date: 10/31/2024  -     Hemoglobin A1C; Future; Expected date: 10/31/2024    4. MAHARAJ (dyspnea on exertion)  5. Abnormal EKG  - EKG: HR 78, NSR, nonspecific T wave abnormality, when compared to EKG from 9/15/21 nonspecific T wave abnormality, worse in inferior leads T wave inversion now evident in anterior leads  - discussed findings with pt  - will obtain stress echo  - if any concerning s/s, pt to go to ED  - f/u with cards as well  -     EKG with interpretation and Report -IN OFFICE [28111]  -     CARD ECHO STRESS ECHO/REST AND STRESS(CPT=93350/89108 DMG 84736); Future; Expected date: 10/31/2024    6. Abdominal aortic atherosclerosis (HCC)  - stable, controlling risk factors  - following with cards    7. Asthma with COPD (chronic obstructive pulmonary disease) (HCC)  - stable  - cpm    8. Essential hypertension, benign  - off meds  - following with cards  -     Comp Metabolic Panel (14); Future; Expected date: 10/31/2024    9. Mixed hyperlipidemia  - cont zetia   - low fat/chol diet and regular exercise  - following with cards  -     Comp Metabolic Panel (14); Future; Expected date: 10/31/2024    10. MDD (major depressive disorder), recurrent episode, mild (HCC)  - denies SI/Hi  - following with psychiatrist   - compliant with meds    11. Fibromyalgia  - stable  - cpm    12. Migraine without aura and without status migrainosus, not intractable  - cpm  - following  with neurology    13. Need for vaccination  -     Prevnar 20 (PCV20) [28826]  The patient indicates understanding of these issues and agrees to the plan.  Reinforced healthy diet, lifestyle, and exercise.      Return in 6 months (on 4/30/2025).     Olga Lidia Romero DO, 10/31/2024     Supplementary Documentation:   General Health:  In the past six months, have you lost more than 10 pounds without trying?: 2 - No  Has your appetite been poor?: No  Type of Diet: Balanced  How does the patient maintain a good energy level?: Daily Walks  How would you describe your daily physical activity?: Light  How would you describe your current health state?: Good  How do you maintain positive mental well-being?: Visiting Family;Visiting Friends  On a scale of 0 to 10, with 0 being no pain and 10 being severe pain, what is your pain level?: 4 - (Moderate)  In the past six months, have you experienced urine leakage?: 1-Yes  At any time do you feel concerned for the safety/well-being of yourself and/or your children, in your home or elsewhere?: No  Have you had any immunizations at another office such as Influenza, Hepatitis B, Tetanus, or Pneumococcal?: No    Health Maintenance   Topic Date Due    Pneumococcal Vaccine: 65+ Years (1 of 2 - PCV) Never done    Zoster Vaccines (1 of 2) Never done    DEXA Scan  09/13/2022    MA Annual Health Assessment  01/01/2024    Annual Depression Screening  01/01/2024    COVID-19 Vaccine (4 - 2023-24 season) 09/01/2024    Colorectal Cancer Screening  11/14/2024    Influenza Vaccine (1) 06/30/2025 (Originally 10/1/2024)    Mammogram  10/09/2025    Fall Risk Screening (Annual)  Completed

## 2024-11-03 PROBLEM — Z63.4 BEREAVEMENT: Status: RESOLVED | Noted: 2024-04-10 | Resolved: 2024-11-03

## 2024-11-03 LAB
ATRIAL RATE: 78 BPM
P AXIS: 43 DEGREES
P-R INTERVAL: 152 MS
Q-T INTERVAL: 386 MS
QRS DURATION: 90 MS
QTC CALCULATION (BEZET): 440 MS
R AXIS: -57 DEGREES
T AXIS: -44 DEGREES
VENTRICULAR RATE: 78 BPM

## 2024-11-04 NOTE — TELEPHONE ENCOUNTER
Contacted patient informed that insurance approval was obtained,offered patient to schedule injection. Patient VU and mentioned that she needs to look at her schedule first and will call back to schedule.

## 2024-11-06 ENCOUNTER — PATIENT MESSAGE (OUTPATIENT)
Dept: FAMILY MEDICINE CLINIC | Facility: CLINIC | Age: 69
End: 2024-11-06

## 2024-11-07 NOTE — TELEPHONE ENCOUNTER
Hi Star,  Hope you're doing well. Your labs demonstrate an A1c of 5.8, which is in the prediabetic range. Please work on a low carbohydrate diet, regular exercise and weight loss. All your other labs are in acceptable range.  Take care,  Dr. CUTLER   Written by Olga Lidia Romero, DO on 11/7/2024  3:15 PM CST

## 2024-11-09 DIAGNOSIS — G43.701 CHRONIC MIGRAINE WITHOUT AURA WITH STATUS MIGRAINOSUS, NOT INTRACTABLE: ICD-10-CM

## 2024-11-11 ENCOUNTER — MED REC SCAN ONLY (OUTPATIENT)
Dept: FAMILY MEDICINE CLINIC | Facility: CLINIC | Age: 69
End: 2024-11-11

## 2024-11-11 DIAGNOSIS — G43.701 CHRONIC MIGRAINE WITHOUT AURA WITH STATUS MIGRAINOSUS, NOT INTRACTABLE: ICD-10-CM

## 2024-11-11 RX ORDER — NARATRIPTAN 2.5 MG/1
2.5 TABLET ORAL AS NEEDED
Qty: 8 TABLET | Refills: 0 | Status: SHIPPED | OUTPATIENT
Start: 2024-11-11

## 2024-11-11 RX ORDER — NARATRIPTAN 2.5 MG/1
2.5 TABLET ORAL AS NEEDED
Qty: 8 TABLET | Refills: 0 | OUTPATIENT
Start: 2024-11-11

## 2024-11-11 NOTE — TELEPHONE ENCOUNTER
Medication: Naratriptan HCl 2.5 MG Oral Tab      Date of last refill: 10/1/24 (#8/0)  [place]     Last office visit: 10/10/24  Due back to clinic per last office note:  3 months (around 1/10/2025).   Date next office visit scheduled:    Future Appointments   Date Time Provider Department Center   11/13/2024  1:00 PM PF CARD STRESS ECHO RM 1 PF [facility] [place]           Last OV note recommendation:    Chronic headache disorder mixed migraines and neck tension headache  Superimposed left temporal stabbing\" icepick\" headaches     CTA head was negative for acute abnormality.  CTA head and neck unremarkable there is mild tortuosity noted in the left distal carotid artery but then nothing definitive for FMD. No cerebral aneurysms     Patient had tried multiple preventive agents for migraines including CGRP monthly injections and Botox.    Tried recently Qulipta 60 mg daily but no good benefit. [place] Qulipta  Try Botox again     Discussed again getting off estrogen supplements as it can aggravate migraines  She tried but had bad hot flashes so went back up.

## 2024-11-12 ENCOUNTER — PATIENT MESSAGE (OUTPATIENT)
Dept: NEUROLOGY | Facility: CLINIC | Age: 69
End: 2024-11-12

## 2024-11-13 ENCOUNTER — HOSPITAL ENCOUNTER (OUTPATIENT)
Dept: CV DIAGNOSTICS | Age: 69
Discharge: HOME | End: 2024-11-13
Attending: FAMILY MEDICINE
Payer: MEDICARE

## 2024-11-13 DIAGNOSIS — R94.31 ABNORMAL EKG: ICD-10-CM

## 2024-11-13 DIAGNOSIS — R06.09 DOE (DYSPNEA ON EXERTION): ICD-10-CM

## 2024-11-13 PROCEDURE — 93018 CV STRESS TEST I&R ONLY: CPT | Performed by: FAMILY MEDICINE

## 2024-11-13 PROCEDURE — 93017 CV STRESS TEST TRACING ONLY: CPT | Performed by: FAMILY MEDICINE

## 2024-11-13 PROCEDURE — 93350 STRESS TTE ONLY: CPT | Performed by: FAMILY MEDICINE

## 2024-11-14 ENCOUNTER — TELEPHONE (OUTPATIENT)
Dept: NEUROLOGY | Facility: CLINIC | Age: 69
End: 2024-11-14

## 2024-11-14 NOTE — TELEPHONE ENCOUNTER
Patient called in she would like to come in sooner for a botox appt and I anything comes up to reach out to the patient. Because her insurance will be changing

## 2024-11-20 ENCOUNTER — PATIENT MESSAGE (OUTPATIENT)
Dept: NEUROLOGY | Facility: CLINIC | Age: 69
End: 2024-11-20

## 2024-11-20 NOTE — TELEPHONE ENCOUNTER
The patient has been scheduled for first available on 01/24/25. She is requesting a sooner appointment due to her insurance.   Can we possibly double book the appointment on 11/25/24 at 3:50pm? Please advise.

## 2024-11-20 NOTE — TELEPHONE ENCOUNTER
Due to minimal staff at end of day, Botox will need to be scheduled earlier in the day.   Patient could be scheduled on 12/9 at 10:50.

## 2024-11-21 PROBLEM — F33.9 EPISODE OF RECURRENT MAJOR DEPRESSIVE DISORDER (HCC): Status: ACTIVE | Noted: 2024-11-21

## 2024-11-21 PROBLEM — F33.9 EPISODE OF RECURRENT MAJOR DEPRESSIVE DISORDER: Status: ACTIVE | Noted: 2024-11-21

## 2024-11-30 ENCOUNTER — PATIENT MESSAGE (OUTPATIENT)
Dept: FAMILY MEDICINE CLINIC | Facility: CLINIC | Age: 69
End: 2024-11-30

## 2024-11-30 DIAGNOSIS — K90.41 GLUTEN INTOLERANCE: Primary | ICD-10-CM

## 2024-12-02 NOTE — TELEPHONE ENCOUNTER
Dr. Romero - patient is requesting a recommendation for a nutritionist to help with diet and one that can help with autoimmune issues with gluten intolerance and migraine triggering foods

## 2024-12-04 ENCOUNTER — TELEPHONE (OUTPATIENT)
Dept: NEUROLOGY | Facility: CLINIC | Age: 69
End: 2024-12-04

## 2024-12-04 NOTE — TELEPHONE ENCOUNTER
Patient is calling requesting a sooner appointment for Botox inj. Patient is currently scheduled for 12/09/24. Patient stated she is in pain, pain starting on her neck and it goes to her head. Please advice if patient can be seen sooner

## 2024-12-04 NOTE — TELEPHONE ENCOUNTER
Patient is currently scheduled for Monday 12/09/2024. Spoke to patient and she states she was calling to see if anything was available sooner. Advised no current appointments available but will let her know if something opens before Monday. Patient verbalized understanding.

## 2024-12-06 DIAGNOSIS — G43.701 CHRONIC MIGRAINE WITHOUT AURA WITH STATUS MIGRAINOSUS, NOT INTRACTABLE: ICD-10-CM

## 2024-12-06 RX ORDER — NARATRIPTAN 2.5 MG/1
2.5 TABLET ORAL AS NEEDED
Qty: 8 TABLET | Refills: 0 | Status: SHIPPED | OUTPATIENT
Start: 2024-12-06

## 2024-12-06 NOTE — TELEPHONE ENCOUNTER
Medication: NARATRIPTAN HCL 2.5 MG Oral Tab      Date of last refill: 11/11/2024 (#8/0)  [place]     Last office visit: 10/10/2024  Due back to clinic per last office note:  Around 01/10/2025  Date next office visit scheduled:    Future Appointments   Date Time Provider Department Center   12/9/2024 10:50 AM Leo Burton MD [facility]   2/18/2025  1:30 PM Iva Nguyen MD [facility]           Last OV note recommendation:    ASSESSMENT/PLAN:             ICD-10-CM     1. Chronic migraine without aura with status migrainosus, not intractable  G43.701         2. Cervico-occipital neuralgia  M54.81         3. Osteoarthritis of cervical spine, unspecified spinal osteoarthritis complication status  M47.812               Chronic headache disorder mixed migraines and neck tension headache  Superimposed left temporal stabbing\" icepick\" headaches     CTA head was negative for acute abnormality.  CTA head and neck unremarkable there is mild tortuosity noted in the left distal carotid artery but then nothing definitive for FMD. No cerebral aneurysms     Patient had tried multiple preventive agents for migraines including CGRP monthly injections and Botox.    Tried recently Qulipta 60 mg daily but no good benefit. [place] Qulipta  Try Botox again     Discussed again getting off estrogen supplements as it can aggravate migraines  She tried but had bad hot flashes so went back up.     Follow with pain medicine to discuss RFA or other options     Leo Burton MD  [facility] [place]

## 2024-12-09 ENCOUNTER — OFFICE VISIT (OUTPATIENT)
Dept: NEUROLOGY | Facility: CLINIC | Age: 69
End: 2024-12-09
Payer: MEDICARE

## 2024-12-09 VITALS
RESPIRATION RATE: 16 BRPM | OXYGEN SATURATION: 95 % | BODY MASS INDEX: 28.61 KG/M2 | WEIGHT: 178 LBS | SYSTOLIC BLOOD PRESSURE: 120 MMHG | HEIGHT: 66 IN | HEART RATE: 95 BPM | DIASTOLIC BLOOD PRESSURE: 82 MMHG

## 2024-12-09 DIAGNOSIS — G43.701 CHRONIC MIGRAINE WITHOUT AURA WITH STATUS MIGRAINOSUS, NOT INTRACTABLE: Primary | ICD-10-CM

## 2024-12-09 DIAGNOSIS — M50.30 DDD (DEGENERATIVE DISC DISEASE), CERVICAL: ICD-10-CM

## 2024-12-09 NOTE — PROGRESS NOTES
HPI:    Patient ID: Carl Alaniz is a 69 year old female.    Migraine   Associated symptoms include neck pain.       Zak Alaniz is a 69 year old female who presents for chronic migraines and occipital neuralgia.   She is here for Botox injections. States continues to have neck pain, interested in physical therapy        Still getting headaches, almost every day, constant headache in the left temporal area.  States has been getting frequent migraines. States working on computer makes with her eyes moving and trying to focus trigger a migraines.   Tried multiple treatment including CRGP injections and Botox. Qulipta not working  Still on estrogen supplements    Has cervical DJD and recent Xray from May 2024 shows moderate degenerative changes at C5-6 and C6-7. Had seen Pain management in the past and had ablation done.  She has not seen Pain management - Oct 2023, had left C6-7 facet joint injection, does not remember if it was effective or not.      Office visit:  Jan 2024    Patient is a 68-year-old female who presented with complaints of increased headaches and visual blurriness.  She has a chronic history of headaches and migraines and states now she has a constant pressure behind her eyes associated with visual blurriness.    She continues to have superimposed icepick headache in the left temporal region.   Had left cervical facet joint injections and also had occipital nerve blocks done in the past with mild relief  States she was having some sinus pressure and URI symptoms and then got doxycycline but no relief.  Her COVID test was negative  She came to the ER and got CT head and CTA brain and neck performed which was negative for any acute abnormality.  There was some mild irregularity in the left carotid.       Last office visit: Nov 2023  Patient is a 68 year old female with history of chronic migraines.  States she has been having new \"ice pick\" headaches in left temporal region and started after  getting left cervical C5/6 and C6/7 facet joint injection. States the left temple sharp pain lasts on and off throughout the day. She is on Nurtec for migraines but insurance didn't approved it so not on any abortive medication           Patient is a 67 year old female with history of chronic migraines.  States continues to have frequent headaches - tried Emgality and Botox with mild benefit. Last Botox injection was in Sept 2021. Reports Nurtec is effective but insurance didn't approved it as preventive therapy. She is taking it as an abortive agent. She states left occipital pain is flaring up again and has pain in the posterior trapezius region. She also c/o left temporal  pain.She is requesting for nerve block    She was following with pain medicine and had a radiofrequency ablation of the left cervical medial branch done which provided some relief in the past           HISTORY:  Past Medical History:    Abnormal Holter monitor finding    rare PAC's and PVC's    Abnormal MRI, cervical spine    uncovertebral degenerative change is suggested, right-sided C2 C3, and left sided C4 C5 with disc bulging noted also at the C4 C5 level.  small central protrusions effacing the subarachnoid space C5 C6 and C6 C7    Chest pain    with exercises    Chronic fatigue syndrome    Colitis    sigmoid erythema consistent with nonspecific colitis    Decreased sexual desire    Depression with anxiety    psychiatry: Dr. Iva Nguyen    Diverticulosis    scattered, minimal    Endometriosis    s/p ablation therapy    Fibromyalgia    History of colon polyps    GI: Dr. Abisai Bay    History of femur fracture    s/p closed reduction percutaneous screw fixation of right femoral neck fracture  internal fixation of the right hip    HTN (hypertension)    Hyperlipidemia    IBD (inflammatory bowel disease)    IBS (irritable bowel syndrome)    chronic, recurrent. GI: Dr. Abisai Bay    Intervertebral disc protrusion    L5/S1 small central  protrusion effacing the thecal sax with a small annular fissure in the posterior annular fibers    Migraines    neuro: Dr. Leo Burton    Nephrolithiasis    Osteopenia    Palpitations    likely anxiety related to dystrophic nails secondary to mechanical injury     Postmenopausal HRT (hormone replacement therapy)    Seasonal allergies    Vitamin D deficiency      Past Surgical History:   Procedure Laterality Date    Cholecystectomy  11/10    bile leak, sepsis, pancreatitis    Colonoscopy  10/16, 4/06, 9/00, 8/97 2000 polyps, 2006 no polyps    Colonoscopy  11/2019    Colonoscopy N/A 11/14/2019    Procedure: COLONOSCOPY, POSSIBLE BIOPSY, POSSIBLE POLYPECTOMY 49051;  Surgeon: Abisai Bay MD;  [facility]    Egd scope  2000, 3/18    normal, empiric dilation    Endometr ablate, thermal  2000    Ercp,diagnostic  2/11    Laparoscopy,lysis adhesns      endometirosis    Leg/ankle surgery proc unlisted  4/18/2012    closed reduction percutaneous screw fixation of right femoral neck fracture  internal fixation of the right hip    Other  April 2016    Cervical Facet injection, Ganglion block    Removal gallbladder      Tubal ligation        Family History   Problem Relation Age of Onset    Lung Disorder Mother         COPD, emphysema    Colon Cancer Maternal Grandmother         colon    Cancer Other         ovarian and breast    Breast Cancer Paternal Aunt 20      Social History     Socioeconomic History    Marital status:     Number of children: 0   Occupational History    Occupation: on disability, former    Tobacco Use    Smoking status: Never    Smokeless tobacco: Never    Tobacco comments:     20 yrs of second hand smoke, mother smoked   Vaping Use    Vaping status: Never Used   Substance and Sexual Activity    Alcohol use: Yes     Alcohol/week: 0.0 - 1.0 standard drinks of alcohol     Comment: occasional    Drug use: Never     Comment: salve    Sexual activity:  Not Currently     Partners: Male     Birth control/protection: Tubal Ligation     Comment: , together since 1994   Other Topics Concern    Caffeine Concern Yes     Comment: 1-1.5 cup of coffee in am    Exercise Yes     Comment: walking dog     Social Drivers of Health      [facility]    Social Connections    [facility]    Housing Stability        Review of Systems   Constitutional: Negative.    HENT: Negative.     Eyes:  Negative for visual disturbance.   Respiratory: Negative.     Cardiovascular: Negative.    Gastrointestinal: Negative.    Endocrine: Negative.    Genitourinary: Negative.    Musculoskeletal:  Positive for neck pain and neck stiffness.   Skin: Negative.    Allergic/Immunologic: Negative.    Neurological:  Positive for headaches.   Hematological: Negative.    Psychiatric/Behavioral: Negative.     All other systems reviewed and are negative.           Current Outpatient Medications   Medication Sig Dispense Refill    NARATRIPTAN HCL 2.5 MG Oral Tab TAKE 1 TABLET (2.5 MG TOTAL) BY MOUTH AS NEEDED. 8 tablet 0    clonazePAM 0.5 MG Oral Tab Take 1-2 tablets (0.5-1 mg total) by mouth nightly as needed for Anxiety. 60 tablet 2    ALPRAZolam 0.5 MG Oral Tab Take 0.5-1 tablets (0.25-0.5 mg total) by mouth daily as needed for Anxiety. 30 tablet 2    amitriptyline 75 MG Oral Tab Take 1 tablet (75 mg total) by mouth nightly. 30 tablet 2    aspirin-acetaminophen-caffeine 250-250-65 MG Oral Tab Take 1 tablet by mouth every 4 (four) hours as needed for Pain.      TIZANIDINE 2 MG Oral Tab TAKE 1 TABLET BY MOUTH 2 TIMES DAILY AS NEEDED. 180 tablet 0    ezetimibe 10 MG Oral Tab Take 1 tablet (10 mg total) by mouth every evening.      LINZESS 145 MCG Oral Cap Take 1 capsule by mouth daily.      valACYclovir 500 MG Oral Tab       Atogepant 60 MG Oral Tab Take 60 mg by mouth daily. 30  tablet 5    Cholecalciferol (VITAMIN D3) 250 MCG (18923 UT) Oral Cap Take 1 capsule by mouth daily. 3000 international unit      amLODIPine 5 MG Oral Tab Take 1 tablet (5 mg total) by mouth daily.      docusate sodium (DULCOLAX PINK STOOL SOFTENER) 100 MG Oral Cap Take 1 capsule (100 mg total) by mouth 2 (two) times daily. 30 capsule 0    dicyclomine 20 MG Oral Tab Take 1 tablet (20 mg total) by mouth 4 (four) times daily.      estradiol 1 MG Oral Tab       albuterol (PROAIR HFA) 108 (90 Base) MCG/ACT Inhalation Aero Soln Inhale 2 puffs into the lungs every 6 (six) hours as needed for Wheezing. 18 g 1    Esomeprazole Magnesium 20 MG Oral Capsule Delayed Release Take 2 capsules (40 mg total) by mouth nightly.      MULTIVITAMIN TAB/CAP Take 1 tablet by mouth daily.       Allergies:  Allergies   Allergen Reactions    Codeine ITCHING    Morphine NAUSEA AND VOMITING and ITCHING    Sulfa Antibiotics ITCHING    Statins MYALGIA    Alc-Benzyl Alc-Sulfamethoxazole-Trimethoprim RASH    Amoxicillin-Pot Clavulanate      Other reaction(s): rash    Bactrim [Sulfamethoxazole W/Trimethoprim] ITCHING    Cefdinir DIARRHEA     Abdominal pain    Cefdinir OTHER (SEE COMMENTS)    Ciprofloxacin ITCHING    Citric Acid OTHER (SEE COMMENTS)    Clarithromycin NAUSEA ONLY     Nausea and abdominal cramping    Dairy Products OTHER (SEE COMMENTS)    Dust     Hydrocodone ITCHING    Lactose OTHER (SEE COMMENTS)    Other OTHER (SEE COMMENTS)    Pain Relief [Goodys Extra Strength] ITCHING     Any strong pain med, Norco, Oxycodone, etc.    Peppermint Flavor     Reglan [Metoclopramide] PALPITATIONS and OTHER (SEE COMMENTS)     Hypertension with Reglan administration via IV    Seasonal     Tomato OTHER (SEE COMMENTS)    Zomig [Zolmitriptan] ITCHING     PHYSICAL EXAM:   Physical Exam  Blood pressure 120/82, pulse 95, resp. rate 16, height 66\", weight 178 lb (80.7 kg), SpO2 95%, not currently breastfeeding.    General Appearance: Well nourished, well  developed, no apparent distress.   HEENT: Normocephalic and atraumatic. + left temporal tenderness   Neck: restricted neck movements  Cardiovascular: Normal rate, regular rhythm and normal heart sounds.    Pulmonary/Chest: Effort normal and breath sounds normal.   Abdominal: Soft. Bowel sounds are normal.     Neurological patient is awake, alert and oriented to person, place and time with normal memory, fund of knowledge, attention/concentration and language.    Cranial Nerves:   II: Visual fields: normal  III: Pupils: equal, round, reactive to light  III,IV,VI: Extra Ocular Movements: intact  V: Facial sensation: intact  VII: Facial strength: intact  VIII: Hearing: intact  IX: Palate: intact  XI: Shoulder shrug: intact  XII: Tongue movement: normal    Motor : Normal tone. Strength is  5 out of 5 in all extremities bilaterally.    Sensory: Sensory examination is normal to light touch and pinprick     Coordination: Intact    Gait: Normal casual gait  TESTS/IMAGING:           Impression   CONCLUSION:       1.  No evidence of acute intracranial process.       2. Unremarkable CTA of the [id number]-[id number]-[id number].     3. There is some irregularity to the vessel wall involving the left distal internal carotid artery which is mildly \"lumpy/bumpy\" which can be seen in fibromuscular dysplasia.          ASSESSMENT/PLAN:       ICD-10-CM    1. Chronic migraine without aura with status migrainosus, not intractable  G43.701       2. DDD (degenerative disc disease), cervical  M50.30 Physical Therapy Referral - [facility]                 Chronic headache disorder mixed migraines and neck tension headache  Superimposed left temporal stabbing\" icepick\" headaches    CTA head was negative for acute abnormality.  CTA head and neck unremarkable there is mild tortuosity noted in the left distal carotid artery but then nothing definitive for FMD. No cerebral aneurysms    Patient had tried multiple preventive agents for migraines including CGRP  monthly injections and Botox.    Start Botox therapy    Refer to PT for neck pain/stiffness      Leo Burton MD  [facility]        Meds This Visit:  Requested Prescriptions      No prescriptions requested or ordered in this encounter       Imaging & Referrals:  OP REFERRAL TO [facility]     ID#[id number]

## 2024-12-09 NOTE — PATIENT INSTRUCTIONS
Refill policies:    Allow 2-3 business days for refills; controlled substances may take longer.  Contact your pharmacy at least 5 days prior to running out of medication and have them send an electronic request or submit request through the “request refill” option in your [employer] account.  Refills are not addressed on weekends; covering physicians do not authorize routine medications on weekends.  No narcotics or controlled substances are refilled after noon on Fridays or by on call physicians.  By law, narcotics must be electronically prescribed.  A 30 day supply with no refills is the maximum allowed.  If your prescription is due for a refill, you may be due for a follow up appointment.  To best provide you care, patients receiving routine medications need to be seen at least once a year.  Patients receiving narcotic/controlled substance medications need to be seen at least once every 3 months.  In the event that your preferred pharmacy does not have the requested medication in stock (e.g. Backordered), it is your responsibility to find another pharmacy that has the requested medication available.  We will gladly send a new prescription to that pharmacy at your request.    Scheduling Tests:    If your physician has ordered radiology tests such as MRI or CT scans, please contact Central Scheduling at [phone] right away to schedule the test.  Once scheduled, the [facility] Centralized Referral Team will work with your insurance carrier to obtain pre-certification or prior authorization.  Depending on your insurance carrier, approval may take 3-10 days.  It is highly recommended patients assure they have received an authorization before having a test performed.  If test is done without insurance authorization, patient may be responsible for the entire amount billed.      Precertification and Prior Authorizations:  If your physician has recommended that you have a procedure or additional testing performed the [facility]  Centralized Referral Team will contact your insurance carrier to obtain pre-certification or prior authorization.    You are strongly encouraged to contact your insurance carrier to verify that your procedure/test has been approved and is a COVERED benefit.  Although the [facility] Centralized Referral Team does its due diligence, the insurance carrier gives the disclaimer that \"Although the procedure is authorized, this does not guarantee payment.\"    Ultimately the patient is responsible for payment.   Thank you for your understanding in this matter.  Paperwork Completion:  If you require FMLA or disability paperwork for your recovery, please make sure to either drop it off or have it faxed to our office at [phone]. Be sure the form has your name and date of birth on it.  The form will be faxed to our Forms Department and they will complete it for you.  There is a 25$ fee for all forms that need to be filled out.  Please be aware there is a 10-14 day turnaround time.  You will need to sign a release of information (TAMIKO) form if your paperwork does not come with one.  You may call the Forms Department with any questions at [phone].  Their fax number is [phone].

## 2024-12-17 ENCOUNTER — APPOINTMENT (OUTPATIENT)
Dept: GENERAL RADIOLOGY | Facility: HOSPITAL | Age: 69
End: 2024-12-17
Attending: EMERGENCY MEDICINE
Payer: MEDICARE

## 2024-12-17 ENCOUNTER — APPOINTMENT (OUTPATIENT)
Dept: CT IMAGING | Facility: HOSPITAL | Age: 69
End: 2024-12-17
Attending: EMERGENCY MEDICINE
Payer: MEDICARE

## 2024-12-17 ENCOUNTER — HOSPITAL ENCOUNTER (EMERGENCY)
Facility: HOSPITAL | Age: 69
Discharge: HOME | End: 2024-12-17
Attending: EMERGENCY MEDICINE
Payer: MEDICARE

## 2024-12-17 VITALS
SYSTOLIC BLOOD PRESSURE: 133 MMHG | BODY MASS INDEX: 28.61 KG/M2 | RESPIRATION RATE: 19 BRPM | TEMPERATURE: 98 F | OXYGEN SATURATION: 100 % | HEART RATE: 76 BPM | DIASTOLIC BLOOD PRESSURE: 92 MMHG | HEIGHT: 66 IN | WEIGHT: 178 LBS

## 2024-12-17 DIAGNOSIS — R06.00 DYSPNEA, UNSPECIFIED TYPE: Primary | ICD-10-CM

## 2024-12-17 LAB
ALBUMIN SERPL-MCNC: 4.5 G/DL (ref 3.2–4.8)
ALBUMIN/GLOB SERPL: 1.7 {RATIO} (ref 1–2)
ALP LIVER SERPL-CCNC: 33 U/L
ALT SERPL-CCNC: 17 U/L
ANION GAP SERPL CALC-SCNC: 9 MMOL/L (ref 0–18)
AST SERPL-CCNC: 23 U/L (ref ?–34)
BASOPHILS # BLD AUTO: 0.05 X10(3) UL (ref 0–0.2)
BASOPHILS NFR BLD AUTO: 0.6 %
BILIRUB SERPL-MCNC: 0.3 MG/DL (ref 0.2–1.1)
BUN BLD-MCNC: 15 MG/DL (ref 9–23)
CALCIUM BLD-MCNC: 9.8 MG/DL (ref 8.7–10.4)
CHLORIDE SERPL-SCNC: 106 MMOL/L (ref 98–112)
CO2 SERPL-SCNC: 26 MMOL/L (ref 21–32)
CREAT BLD-MCNC: 0.92 MG/DL
EGFRCR SERPLBLD CKD-EPI 2021: 68 ML/MIN/1.73M2 (ref 60–?)
EOSINOPHIL # BLD AUTO: 0.11 X10(3) UL (ref 0–0.7)
EOSINOPHIL NFR BLD AUTO: 1.3 %
ERYTHROCYTE [DISTWIDTH] IN BLOOD BY AUTOMATED COUNT: 12.7 %
GLOBULIN PLAS-MCNC: 2.6 G/DL (ref 2–3.5)
GLUCOSE BLD-MCNC: 96 MG/DL (ref 70–99)
HCT VFR BLD AUTO: 40.1 %
HGB BLD-MCNC: 13.3 G/DL
IMM GRANULOCYTES # BLD AUTO: 0.03 X10(3) UL (ref 0–1)
IMM GRANULOCYTES NFR BLD: 0.3 %
LYMPHOCYTES # BLD AUTO: 3.08 X10(3) UL (ref 1–4)
LYMPHOCYTES NFR BLD AUTO: 35.2 %
MCH RBC QN AUTO: 29 PG (ref 26–34)
MCHC RBC AUTO-ENTMCNC: 33.2 G/DL (ref 31–37)
MCV RBC AUTO: 87.6 FL
MONOCYTES # BLD AUTO: 0.53 X10(3) UL (ref 0.1–1)
MONOCYTES NFR BLD AUTO: 6.1 %
NEUTROPHILS # BLD AUTO: 4.94 X10 (3) UL (ref 1.5–7.7)
NEUTROPHILS # BLD AUTO: 4.94 X10(3) UL (ref 1.5–7.7)
NEUTROPHILS NFR BLD AUTO: 56.5 %
OSMOLALITY SERPL CALC.SUM OF ELEC: 293 MOSM/KG (ref 275–295)
PLATELET # BLD AUTO: 282 10(3)UL (ref 150–450)
POTASSIUM SERPL-SCNC: 4.2 MMOL/L (ref 3.5–5.1)
PROT SERPL-MCNC: 7.1 G/DL (ref 5.7–8.2)
RBC # BLD AUTO: 4.58 X10(6)UL
SODIUM SERPL-SCNC: 141 MMOL/L (ref 136–145)
TROPONIN I SERPL HS-MCNC: <3 NG/L
WBC # BLD AUTO: 8.7 X10(3) UL (ref 4–11)

## 2024-12-17 PROCEDURE — 93005 ELECTROCARDIOGRAM TRACING: CPT

## 2024-12-17 PROCEDURE — 99285 EMERGENCY DEPT VISIT HI MDM: CPT

## 2024-12-17 PROCEDURE — 80053 COMPREHEN METABOLIC PANEL: CPT | Performed by: EMERGENCY MEDICINE

## 2024-12-17 PROCEDURE — 71275 CT ANGIOGRAPHY CHEST: CPT | Performed by: EMERGENCY MEDICINE

## 2024-12-17 PROCEDURE — 85025 COMPLETE CBC W/AUTO DIFF WBC: CPT

## 2024-12-17 PROCEDURE — 80053 COMPREHEN METABOLIC PANEL: CPT

## 2024-12-17 PROCEDURE — 84484 ASSAY OF TROPONIN QUANT: CPT

## 2024-12-17 PROCEDURE — 36415 COLL VENOUS BLD VENIPUNCTURE: CPT

## 2024-12-17 PROCEDURE — 93010 ELECTROCARDIOGRAM REPORT: CPT

## 2024-12-17 PROCEDURE — 84484 ASSAY OF TROPONIN QUANT: CPT | Performed by: EMERGENCY MEDICINE

## 2024-12-17 PROCEDURE — 85025 COMPLETE CBC W/AUTO DIFF WBC: CPT | Performed by: EMERGENCY MEDICINE

## 2024-12-17 PROCEDURE — 71045 X-RAY EXAM CHEST 1 VIEW: CPT | Performed by: EMERGENCY MEDICINE

## 2024-12-17 PROCEDURE — 99284 EMERGENCY DEPT VISIT MOD MDM: CPT

## 2024-12-17 NOTE — DISCHARGE INSTRUCTIONS
Consider pulmonary function testing.  Follow-up with your doctor.  Consider pulmonology.  Return if worsening symptoms, new complaints.

## 2024-12-17 NOTE — ED INITIAL ASSESSMENT (HPI)
Patient was at cardiologist (Dr. Nia Moe) today reported feeling dyspnea on exertion on and off since October but has gotten worse over the last two weeks. Reports when she goes up the stairs she has to sit for a few minutes. No dyspnea at rest.

## 2024-12-17 NOTE — ED PROVIDER NOTES
Patient Seen in: [facility] Emergency Department      History     Chief Complaint   Patient presents with    Difficulty Breathing     Stated Complaint: Dr. Moe wants CTA with [facility], SOB with walking    Subjective:   HPI      Patient is a very pleasant 69-year-old female presents from her cardiologist office.  She was sent to the ER for a CT scan to rule out PE.  Patient had exertional dyspnea for the last 2 months.  Seems worse over the last 2 weeks.  Says sometimes will have cough and bronchitic symptoms though not having that now.  She had a stress test at the end of November that was normal.  Symptoms have been persistent.  She will have to rest going up stairs.  No other specific complaints.  No fevers or chills.  Denies chest pain currently.  No history of blood clot.    Objective:     Past Medical History:    Abnormal Holter monitor finding    rare PAC's and PVC's    Abnormal MRI, cervical spine    uncovertebral degenerative change is suggested, right-sided C2 C3, and left sided C4 C5 with disc bulging noted also at the C4 C5 level.  small central protrusions effacing the subarachnoid space C5 C6 and C6 C7    Chest pain    with exercises    Chronic fatigue syndrome    Colitis    sigmoid erythema consistent with nonspecific colitis    Decreased sexual desire    Depression with anxiety    psychiatry: Dr. Iva Nguyen    Diverticulosis    scattered, minimal    Endometriosis    s/p ablation therapy    Fibromyalgia    History of colon polyps    GI: Dr. Abisai Bay    History of femur fracture    s/p closed reduction percutaneous screw fixation of right femoral neck fracture  internal fixation of the right hip    HTN (hypertension)    Hyperlipidemia    IBD (inflammatory bowel disease)    IBS (irritable bowel syndrome)    chronic, recurrent. GI: Dr. Abisai Bay    Intervertebral disc protrusion    L5/S1 small central protrusion effacing the thecal sax with a small annular fissure in the posterior annular  fibers    Migraines    neuro: Dr. Leo Burton    Nephrolithiasis    Osteopenia    Palpitations    likely anxiety related to dystrophic nails secondary to mechanical injury     Postmenopausal HRT (hormone replacement therapy)    Seasonal allergies    Vitamin D deficiency              Past Surgical History:   Procedure Laterality Date    Cholecystectomy  11/10    bile leak, sepsis, pancreatitis    Colonoscopy  10/16, 4/06, 9/00, 8/97 2000 polyps, 2006 no polyps    Colonoscopy  11/2019    Colonoscopy N/A 11/14/2019    Procedure: COLONOSCOPY, POSSIBLE BIOPSY, POSSIBLE POLYPECTOMY 44914;  Surgeon: Abisai Bay MD;  [facility]: [facility]    Egd scope  2000, 3/18    normal, empiric dilation    Endometr ablate, thermal  2000    Ercp,diagnostic  2/11    Laparoscopy,lysis adhesns      endometirosis    Leg/ankle surgery proc unlisted  4/18/2012    closed reduction percutaneous screw fixation of right femoral neck fracture  internal fixation of the right hip    Other  April 2016    Cervical Facet injection, Ganglion block    Removal gallbladder      Tubal ligation                  Social History     Socioeconomic History    Marital status:     Number of children: 0   Occupational History    Occupation: on disability, former    Tobacco Use    Smoking status: Never    Smokeless tobacco: Never    Tobacco comments:     20 yrs of second hand smoke, mother smoked   Vaping Use    Vaping status: Never Used   Substance and Sexual Activity    Alcohol use: Yes     Alcohol/week: 0.0 - 1.0 standard drinks of alcohol     Comment: occasional    Drug use: Never     Comment: salve    Sexual activity: Not Currently     Partners: Male     Birth control/protection: Tubal Ligation     Comment: , together since 1994   Other Topics Concern    Caffeine Concern Yes     Comment: 1-1.5 cup of coffee in am    Exercise Yes     Comment: walking dog     Social Drivers of Health      Received from [facility]    Social Connections    Received from [facility]    Housing Stability                  Physical Exam     ED Triage Vitals [12/17/24 1457]   /76   Pulse 85   Resp 18   Temp 97.6 °F (36.4 °C)   Temp src    SpO2 98 %   O2 Device None (Room air)       Current Vitals:   Vital Signs  BP: (!) 133/92  Pulse: 76  Resp: 19  Temp: 97.6 °F (36.4 °C)  MAP (mmHg): (!) 105    Oxygen Therapy  SpO2: 100 %  O2 Device: None (Room air)        Physical Exam  General: Patient is resting comfortably in no acute distress  HEENT: Normal cephalic atraumatic.  Nonicteric sclera.  Moist mucous membranes.  No meningismus.  No adenopathy  Lungs: No tachypnea.  Lungs clear to auscultation bilaterally without rales/rhonchi.  Equal breath sounds bilaterally  Cardiac: No tachycardia.  No murmurs.  Regular rate and rhythm.  Abdomen: Soft and nontender throughout.  No rebound or guarding  Extremities: No clubbing/cyanosis/edema.  Skin: No rashes, no pallor  Neuro: Awake oriented ×3.  Nonfocal.  Good strength throughout    ED Course     Labs Reviewed   COMP METABOLIC PANEL (14) - Abnormal; Notable for the following components:       Result Value    Alkaline Phosphatase 33 (*)     All other components within normal limits   TROPONIN I HIGH SENSITIVITY - Normal   CBC WITH DIFFERENTIAL WITH PLATELET     EKG    Rate, intervals and axes as noted on EKG Report.  Rate: 74  Rhythm: Sinus Rhythm  Reading: Normal sinus rhythm.  Nonspecific ST-T wave wave changes without significant changes from old EKGs.  Axis/intervals are noted but otherwise, agree with EKG report                Hemoglobin normal.  Troponin less than 3.  Labs reviewed.     CTA chest: No PE.  No evidence of chronic interstitial lung disease.  Central bronchiectasis without changes since 2010.    Chest x-ray: I personally reviewed the films and my independent interpertaion showed  no acute pathology.  Official report reviewed.  Blood work reviewed.  Troponin less than 3.  MDM      Persistent dyspnea on exertion.  Recent negative stress test.  Differential includes bronchospasm, deconditioning, coronary disease, PE, other.  Seen by her cardiologist and recommended a CT of the chest to rule out PE.  Will proceed to CT for further evaluation.  Will discuss with cardiology after imaging.  Dyspnea on exertion.  Symptoms over several months.  Recent negative stress echo.  Was sent in by cardiology to rule out PE.  CTA negative for this.  Discussed with patient, consider pulmonary function testing.  Follow-up with her cardiologist as well as pulmonology.  Return if worsening symptoms, new complaints.      Medical Decision Making      Disposition and Plan     Clinical Impression:  1. Dyspnea, unspecified type         Disposition:  Discharge  12/17/2024  5:27 pm    Follow-up:  Olga Lidia Romero DO  [address] [address]  [phone]    Follow up in 2 day(s)      Nia Moe MD  [address] [address]  [phone]    Follow up in 1 week(s)            Medications Prescribed:  Discharge Medication List as of 12/17/2024  5:32 PM              Supplementary Documentation:

## 2024-12-18 ENCOUNTER — PATIENT MESSAGE (OUTPATIENT)
Dept: FAMILY MEDICINE CLINIC | Facility: CLINIC | Age: 69
End: 2024-12-18

## 2024-12-18 ENCOUNTER — TELEPHONE (OUTPATIENT)
Dept: INTERNAL MEDICINE CLINIC | Facility: CLINIC | Age: 69
End: 2024-12-18

## 2024-12-18 DIAGNOSIS — J20.9 ACUTE BRONCHITIS, UNSPECIFIED ORGANISM: ICD-10-CM

## 2024-12-18 DIAGNOSIS — M62.89 MUSCLE TIGHTNESS: ICD-10-CM

## 2024-12-18 LAB
ATRIAL RATE: 74 BPM
P AXIS: 42 DEGREES
P-R INTERVAL: 156 MS
Q-T INTERVAL: 394 MS
QRS DURATION: 92 MS
QTC CALCULATION (BEZET): 437 MS
R AXIS: -51 DEGREES
T AXIS: 1 DEGREES
VENTRICULAR RATE: 74 BPM

## 2024-12-18 RX ORDER — ALBUTEROL SULFATE 90 UG/1
2 INHALANT RESPIRATORY (INHALATION) EVERY 6 HOURS PRN
Qty: 18 G | Refills: 1 | Status: CANCELLED | OUTPATIENT
Start: 2024-12-18

## 2024-12-18 NOTE — TELEPHONE ENCOUNTER
Patient called requesting medication for shortness of breath.  States she was seen in the ED yesterday for same.  Was at her Cardiologist and was reccommended to go to ED to rule out PE have CT and further work up. She states that the ED doctor didn't give her any medication and her inhaler (Albuterol) is empty. Advised to make an ED follow up appointment however she declined and wanted a message sent to Dr. Romero.  I secured an appointment for tomorrow and if Dr. Romero will refill albuterol then she will cancel.    Dr. Romero - Can you refill pended medication or do you need to see patient?

## 2024-12-18 NOTE — TELEPHONE ENCOUNTER
Medication:  TIZANIDINE 2 MG Oral Tab      Date of last refill: 09/17/2024 (#180/0)  [place]     Last office visit: 12/09/2024  Due back to clinic per last office note:  Around 03/03/2025  Date next office visit scheduled:    Future Appointments   Date Time Provider Department Center   2/18/2025  1:30 PM Iva Nguyen MD [facility]   3/12/2025  1:00 PM Leo Burton MD [facility]           Last OV note recommendation:    ASSESSMENT/PLAN:          ICD-10-CM     1. Chronic migraine without aura with status migrainosus, not intractable  G43.701         2. DDD (degenerative disc disease), cervical  M50.30 Physical Therapy Referral - [facility]                       Chronic headache disorder mixed migraines and neck tension headache  Superimposed left temporal stabbing\" icepick\" headaches     CTA head was negative for acute abnormality.  CTA head and neck unremarkable there is mild tortuosity noted in the left distal carotid artery but then nothing definitive for FMD. No cerebral aneurysms     Patient had tried multiple preventive agents for migraines including CGRP monthly injections and Botox.    Start Botox therapy     Refer to PT for neck pain/stiffness        Leo Burton MD  [facility]

## 2024-12-19 ENCOUNTER — OFFICE VISIT (OUTPATIENT)
Dept: FAMILY MEDICINE CLINIC | Facility: CLINIC | Age: 69
End: 2024-12-19
Payer: MEDICARE

## 2024-12-19 VITALS
HEIGHT: 66 IN | RESPIRATION RATE: 18 BRPM | SYSTOLIC BLOOD PRESSURE: 120 MMHG | OXYGEN SATURATION: 99 % | WEIGHT: 178 LBS | BODY MASS INDEX: 28.61 KG/M2 | TEMPERATURE: 97 F | DIASTOLIC BLOOD PRESSURE: 68 MMHG | HEART RATE: 91 BPM

## 2024-12-19 DIAGNOSIS — J44.89 ASTHMA WITH COPD (CHRONIC OBSTRUCTIVE PULMONARY DISEASE) (HCC): ICD-10-CM

## 2024-12-19 DIAGNOSIS — R06.09 DYSPNEA ON EXERTION: Primary | ICD-10-CM

## 2024-12-19 PROCEDURE — 1170F FXNL STATUS ASSESSED: CPT | Performed by: FAMILY MEDICINE

## 2024-12-19 PROCEDURE — 99214 OFFICE O/P EST MOD 30 MIN: CPT | Performed by: FAMILY MEDICINE

## 2024-12-19 RX ORDER — FLUTICASONE PROPIONATE 110 UG/1
2 AEROSOL, METERED RESPIRATORY (INHALATION) 2 TIMES DAILY
Qty: 1 EACH | Refills: 1 | Status: SHIPPED | OUTPATIENT
Start: 2024-12-19 | End: 2024-12-23

## 2024-12-19 RX ORDER — TIZANIDINE 2 MG/1
2 TABLET ORAL 2 TIMES DAILY PRN
Qty: 180 TABLET | Refills: 0 | Status: SHIPPED | OUTPATIENT
Start: 2024-12-19

## 2024-12-19 RX ORDER — ALBUTEROL SULFATE 0.83 MG/ML
2.5 SOLUTION RESPIRATORY (INHALATION) EVERY 4 HOURS PRN
Qty: 1 EACH | Refills: 3 | Status: SHIPPED | OUTPATIENT
Start: 2024-12-19

## 2024-12-19 RX ORDER — ALBUTEROL SULFATE 90 UG/1
2 INHALANT RESPIRATORY (INHALATION) EVERY 6 HOURS PRN
Qty: 1 EACH | Refills: 2 | Status: SHIPPED | OUTPATIENT
Start: 2024-12-19

## 2024-12-19 RX ORDER — BEMPEDOIC ACID AND EZETIMIBE 180; 10 MG/1; MG/1
TABLET, FILM COATED ORAL
COMMUNITY
Start: 2024-12-17

## 2024-12-19 NOTE — PROGRESS NOTES
Subjective:   Patient ID: Carl Alaniz is a 69 year old female.    HPI  69yr old female presents for f/u after recent ED visit for ruling out PE by cardiologist's office. States she had been having exertional dyspnea for the past few weeks. She had a normal stress test last month. She had seen her cardiology, who recommended she be seen in ED for CTA. States she only has the shortness of breath with walking but at rest she is fine. Denies any cp/p, n/v or diaphoresis.     Does have asthma with COPD, not taking any maintenance inhalers but has been using her albuterol. Needs new rx. No known triggers.        History/Other:   Review of Systems   Constitutional:  Negative for diaphoresis and fatigue.   Respiratory:  Positive for shortness of breath. Negative for cough and wheezing.    Cardiovascular:  Negative for chest pain and palpitations.   Neurological:  Negative for light-headedness and headaches.     Current Outpatient Medications   Medication Sig Dispense Refill    albuterol 108 (90 Base) MCG/ACT Inhalation Aero Soln Inhale 2 puffs into the lungs every 6 (six) hours as needed. 1 each 2    albuterol (2.5 MG/3ML) 0.083% Inhalation Nebu Soln Take 3 mL (2.5 mg total) by nebulization every 4 (four) hours as needed for Wheezing. 1 each 3    NARATRIPTAN HCL 2.5 MG Oral Tab TAKE 1 TABLET (2.5 MG TOTAL) BY MOUTH AS NEEDED. 8 tablet 0    clonazePAM 0.5 MG Oral Tab Take 1-2 tablets (0.5-1 mg total) by mouth nightly as needed for Anxiety. 60 tablet 2    ALPRAZolam 0.5 MG Oral Tab Take 0.5-1 tablets (0.25-0.5 mg total) by mouth daily as needed for Anxiety. 30 tablet 2    amitriptyline 75 MG Oral Tab Take 1 tablet (75 mg total) by mouth nightly. 30 tablet 2    aspirin-acetaminophen-caffeine 250-250-65 MG Oral Tab Take 1 tablet by mouth every 4 (four) hours as needed for Pain.      ezetimibe 10 MG Oral Tab Take 1 tablet (10 mg total) by mouth every evening.      LINZESS 145 MCG Oral Cap Take 1 capsule by mouth daily.       valACYclovir 500 MG Oral Tab       Atogepant 60 MG Oral Tab Take 60 mg by mouth daily. 30 tablet 5    Cholecalciferol (VITAMIN D3) 250 MCG (34318 UT) Oral Cap Take 1 capsule by mouth daily. 3000 international unit      amLODIPine 5 MG Oral Tab Take 1 tablet (5 mg total) by mouth daily.      docusate sodium (DULCOLAX PINK STOOL SOFTENER) 100 MG Oral Cap Take 1 capsule (100 mg total) by mouth 2 (two) times daily. 30 capsule 0    dicyclomine 20 MG Oral Tab Take 1 tablet (20 mg total) by mouth 4 (four) times daily.      estradiol 1 MG Oral Tab       albuterol (PROAIR HFA) 108 (90 Base) MCG/ACT Inhalation Aero Soln Inhale 2 puffs into the lungs every 6 (six) hours as needed for Wheezing. 18 g 1    Esomeprazole Magnesium 20 MG Oral Capsule Delayed Release Take 2 capsules (40 mg total) by mouth nightly.      MULTIVITAMIN TAB/CAP Take 1 tablet by mouth daily.      fluticasone propionate 110 MCG/ACT Inhalation Aerosol Inhale 2 puffs into the lungs 2 (two) times daily. 1 each 1    fluticasone-salmeterol 100-50 MCG/ACT Inhalation Aerosol Powder, Breath Activated Inhale 1 puff into the lungs 2 (two) times daily. 1 each 0    TIZANIDINE 2 MG Oral Tab TAKE 1 TABLET BY MOUTH 2 TIMES DAILY AS NEEDED. 180 tablet 0    NEXLIZET 180-10 MG Oral Tab  (Patient not taking: Reported on 12/19/2024)       Allergies:Allergies[1]    Objective:   Physical Exam  Vitals and nursing note reviewed.   Constitutional:       Appearance: Normal appearance.   HENT:      Head: Normocephalic and atraumatic.   Cardiovascular:      Rate and Rhythm: Normal rate and regular rhythm.   Pulmonary:      Effort: Pulmonary effort is normal.      Breath sounds: Normal breath sounds. No wheezing, rhonchi or rales.   Skin:     General: Skin is warm and dry.   Neurological:      Mental Status: She is alert and oriented to person, place, and time.   Psychiatric:         Mood and Affect: Mood normal.         Behavior: Behavior normal.         Assessment & Plan:   1.  Dyspnea on exertion    2. Asthma with COPD (chronic obstructive pulmonary disease) ([facility])    - reviewed cardiology notes, recent tests and ED visit  - will obtain PFTs  - will start pt on maintenance inhaler along with albuterol inhaler and nebulizer as needed  - will send rx for Flovent, reviewed indications, dosing and SEs  - avoidance of potential triggers  - will also refer to pulm, Dr. Ruvalcaba for further recs  - she understands and agrees with tx plan  - RTC in 1mo for re-eval, sooner if needed    No orders of the defined types were placed in this encounter.      Meds This Visit:  Requested Prescriptions     Signed Prescriptions Disp Refills    albuterol 108 (90 Base) MCG/ACT Inhalation Aero Soln 1 each 2     Sig: Inhale 2 puffs into the lungs every 6 (six) hours as needed.    albuterol (2.5 MG/3ML) 0.083% Inhalation Nebu Soln 1 each 3     Sig: Take 3 mL (2.5 mg total) by nebulization every 4 (four) hours as needed for Wheezing.       Imaging & Referrals:  PULMONARY - INTERNAL         [1]   Allergies  Allergen Reactions    Codeine ITCHING    Morphine NAUSEA AND VOMITING and ITCHING    Sulfa Antibiotics ITCHING    Statins MYALGIA    Alc-Benzyl Alc-Sulfamethoxazole-Trimethoprim RASH    Amoxicillin-Pot Clavulanate      Other reaction(s): rash    Bactrim [Sulfamethoxazole W/Trimethoprim] ITCHING    Cefdinir DIARRHEA     Abdominal pain    Cefdinir OTHER (SEE COMMENTS)    Ciprofloxacin ITCHING    Citric Acid OTHER (SEE COMMENTS)    Clarithromycin NAUSEA ONLY     Nausea and abdominal cramping    Dairy Products OTHER (SEE COMMENTS)    Dust     Hydrocodone ITCHING    Lactose OTHER (SEE COMMENTS)    Other OTHER (SEE COMMENTS)    Pain Relief [Goodys Extra Strength] ITCHING     Any strong pain med, Norco, Oxycodone, etc.    Peppermint Flavor     Reglan [Metoclopramide] PALPITATIONS and OTHER (SEE COMMENTS)     Hypertension with Reglan administration via IV    Seasonal     Tomato OTHER (SEE COMMENTS)    Zomig [Zolmitriptan]  ITCHING

## 2024-12-23 ENCOUNTER — E-VISIT (OUTPATIENT)
Dept: TELEHEALTH | Age: 69
End: 2024-12-23
Payer: MEDICARE

## 2024-12-23 ENCOUNTER — MOBILE ENCOUNTER (OUTPATIENT)
Dept: FAMILY MEDICINE CLINIC | Facility: CLINIC | Age: 69
End: 2024-12-23

## 2024-12-23 DIAGNOSIS — Z02.9 ADMINISTRATIVE ENCOUNTER: Primary | ICD-10-CM

## 2024-12-23 RX ORDER — PREDNISONE 20 MG/1
40 TABLET ORAL DAILY
Qty: 10 TABLET | Refills: 0 | Status: SHIPPED | OUTPATIENT
Start: 2024-12-23 | End: 2024-12-28

## 2024-12-23 RX ORDER — FLUTICASONE PROPIONATE AND SALMETEROL 100; 50 UG/1; UG/1
1 POWDER RESPIRATORY (INHALATION) 2 TIMES DAILY
Qty: 1 EACH | Refills: 0 | Status: SHIPPED | OUTPATIENT
Start: 2024-12-23

## 2024-12-23 RX ORDER — FLUTICASONE PROPIONATE 110 UG/1
2 AEROSOL, METERED RESPIRATORY (INHALATION) 2 TIMES DAILY
Qty: 1 EACH | Refills: 1 | Status: SHIPPED | OUTPATIENT
Start: 2024-12-23

## 2024-12-24 NOTE — PROGRESS NOTES
Pt. Wanted PCP to sent Rx to a different pharmacy. Looks like Rx was sent after e-visit submitted by on-call doctor.  Canceling e-visit.

## 2024-12-27 NOTE — PROGRESS NOTES
Paged by Pt.   Was having difficult time obtaining RX ordered by her provider.  (Flovent) not covered.  RX for Advair.   Pred Burst also sent as this has been effective int he past and pt having had time getting coverage for any ICS.     show

## 2025-01-08 DIAGNOSIS — G43.701 CHRONIC MIGRAINE WITHOUT AURA WITH STATUS MIGRAINOSUS, NOT INTRACTABLE: ICD-10-CM

## 2025-01-08 RX ORDER — NARATRIPTAN 2.5 MG/1
2.5 TABLET ORAL AS NEEDED
Qty: 8 TABLET | Refills: 2 | Status: SHIPPED | OUTPATIENT
Start: 2025-01-08

## 2025-01-08 NOTE — TELEPHONE ENCOUNTER
Medication: Naratriptan 2.5mg     Date of last refill: 12/6/24 (#8/0)  [place]      Last office visit: 12/9/24  Due back to clinic per last office note:  12w  Date next office visit scheduled:    Future Appointments   Date Time Provider Department Center   2/10/2025  2:00 PM Remy Bueno MD [id number] [facility]   2/18/2025  1:30 PM Iva Nguyen MD [id number]   3/12/2025  1:00 PM Leo Burton MD [facility]           Last OV note recommendation:      Chronic migraine without aura with status migrainosus, not intractable  G43.701          2. DDD (degenerative disc disease), cervical  M50.30 Physical Therapy Referral - [facility]                       Chronic headache disorder mixed migraines and neck tension headache  Superimposed left temporal stabbing\" icepick\" headaches     CTA head was negative for acute abnormality.  CTA head and neck unremarkable there is mild tortuosity noted in the left distal carotid artery but then nothing definitive for FMD. No cerebral aneurysms     Patient had tried multiple preventive agents for migraines including CGRP monthly injections and Botox.    Start Botox therapy

## 2025-02-03 ENCOUNTER — PATIENT MESSAGE (OUTPATIENT)
Dept: NEUROLOGY | Facility: CLINIC | Age: 70
End: 2025-02-03

## 2025-02-03 RX ORDER — METHYLPREDNISOLONE 4 MG/1
TABLET ORAL
Qty: 21 EACH | Refills: 0 | Status: SHIPPED | OUTPATIENT
Start: 2025-02-03

## 2025-02-03 NOTE — TELEPHONE ENCOUNTER
Refused. Patient was never prescribed this medication.       Medication: NURTEC 75 MG Oral Tablet Dispersible      Date of last refill:  (#/)  [place]     Last office visit: 12/09/2024  Due back to clinic per last office note:  Around 03/03/2025  Date next office visit scheduled:    Future Appointments   Date Time Provider Department Center   2/10/2025  2:00 PM Remy Bueno MD [facility] [id number] [facility]   2/18/2025  1:30 PM Iva Nguyen MD [facility]   3/12/2025  1:00 PM Leo Burton MD [facility]           Last OV note recommendation:      ASSESSMENT/PLAN:          ICD-10-CM     1. Chronic migraine without aura with status migrainosus, not intractable  G43.701         2. DDD (degenerative disc disease), cervical  M50.30 Physical Therapy Referral - [facility]                       Chronic headache disorder mixed migraines and neck tension headache  Superimposed left temporal stabbing\" icepick\" headaches     CTA head was negative for acute abnormality.  CTA head and neck unremarkable there is mild tortuosity noted in the left distal carotid artery but then nothing definitive for FMD. No cerebral aneurysms     Patient had tried multiple preventive agents for migraines including CGRP monthly injections and Botox.    Start Botox therapy     Refer to PT for neck pain/stiffness

## 2025-02-04 RX ORDER — RIMEGEPANT SULFATE 75 MG/75MG
1 TABLET, ORALLY DISINTEGRATING ORAL EVERY OTHER DAY
Qty: 16 TABLET | Refills: 5 | OUTPATIENT
Start: 2025-02-04

## 2025-02-10 ENCOUNTER — OFFICE VISIT (OUTPATIENT)
Facility: CLINIC | Age: 70
End: 2025-02-10
Payer: MEDICARE

## 2025-02-10 VITALS
SYSTOLIC BLOOD PRESSURE: 112 MMHG | HEIGHT: 66 IN | HEART RATE: 88 BPM | BODY MASS INDEX: 28.61 KG/M2 | DIASTOLIC BLOOD PRESSURE: 68 MMHG | OXYGEN SATURATION: 98 % | RESPIRATION RATE: 16 BRPM | WEIGHT: 178 LBS

## 2025-02-10 DIAGNOSIS — R06.02 SHORTNESS OF BREATH: Primary | ICD-10-CM

## 2025-02-10 DIAGNOSIS — J45.40 MODERATE PERSISTENT ASTHMA WITHOUT COMPLICATION (HCC): ICD-10-CM

## 2025-02-10 PROCEDURE — 1160F RVW MEDS BY RX/DR IN RCRD: CPT | Performed by: INTERNAL MEDICINE

## 2025-02-10 PROCEDURE — 1159F MED LIST DOCD IN RCRD: CPT | Performed by: INTERNAL MEDICINE

## 2025-02-10 PROCEDURE — 99204 OFFICE O/P NEW MOD 45 MIN: CPT | Performed by: INTERNAL MEDICINE

## 2025-02-10 RX ORDER — BUDESONIDE, GLYCOPYRROLATE, AND FORMOTEROL FUMARATE 160; 9; 4.8 UG/1; UG/1; UG/1
2 AEROSOL, METERED RESPIRATORY (INHALATION) 2 TIMES DAILY
Qty: 10.7 G | Refills: 5 | Status: SHIPPED | OUTPATIENT
Start: 2025-02-10

## 2025-02-10 NOTE — PROGRESS NOTES
[facility] General Pulmonary Consult Note    Chief Complaint:  Chief Complaint   Patient presents with    New Patient     Pt c/o dyspnea on exertion CT 12/17       History of Present Illness:  Carl Alaniz is a 69 year old female who presents today for evaluation of shortness of breath.  This has been ongoing for about a year.  Usually has some seasonal allergies but this has been persisting.  Was worse in fall but has subsided slightly.  No fevers, chills, feeling sick, etc.  Never smoker.  Mom smoked in house.  Denies any significant rhiorhea, sore throat, malaise etc.  Tried advair with little to minimal improvement.      Past Medical History:   Past Medical History:    Abnormal Holter monitor finding    rare PAC's and PVC's    Abnormal MRI, cervical spine    uncovertebral degenerative change is suggested, right-sided C2 C3, and left sided C4 C5 with disc bulging noted also at the C4 C5 level.  small central protrusions effacing the subarachnoid space C5 C6 and C6 C7    Chest pain    with exercises    Chronic fatigue syndrome    Colitis    sigmoid erythema consistent with nonspecific colitis    Decreased sexual desire    Depression with anxiety    psychiatry: Dr. Iva Nguyen    Diverticulosis    scattered, minimal    Endometriosis    s/p ablation therapy    Fibromyalgia    History of colon polyps    GI: Dr. Abisai Bay    History of femur fracture    s/p closed reduction percutaneous screw fixation of right femoral neck fracture  internal fixation of the right hip    HTN (hypertension)    Hyperlipidemia    IBD (inflammatory bowel disease)    IBS (irritable bowel syndrome)    chronic, recurrent. GI: Dr. Abisai Bay    Intervertebral disc protrusion    L5/S1 small central protrusion effacing the thecal sax with a small annular fissure in the posterior annular fibers    Migraines    neuro: Dr. Leo Burton    Nephrolithiasis    Osteopenia    Palpitations    likely anxiety related to dystrophic nails  secondary to mechanical injury     Postmenopausal HRT (hormone replacement therapy)    Seasonal allergies    Vitamin D deficiency        Past Surgical History:   Past Surgical History:   Procedure Laterality Date    Cholecystectomy  11/10    bile leak, sepsis, pancreatitis    Colonoscopy  10/16, 4/06, 9/00, 8/97 2000 polyps, 2006 no polyps    Colonoscopy  11/2019    Colonoscopy N/A 11/14/2019    Procedure: COLONOSCOPY, POSSIBLE BIOPSY, POSSIBLE POLYPECTOMY 82413;  Surgeon: Abisai Bay MD;  Location: [facility]    Egd scope  2000, 3/18    normal, empiric dilation    Endometr ablate, thermal  2000    Ercp,diagnostic  2/11    Laparoscopy,lysis adhesns      endometirosis    Leg/ankle surgery proc unlisted  4/18/2012    closed reduction percutaneous screw fixation of right femoral neck fracture  internal fixation of the right hip    Other  April 2016    Cervical Facet injection, Ganglion block    Removal gallbladder      Tubal ligation         Family Medical History:   Family History   Problem Relation Age of Onset    Lung Disorder Mother         COPD, emphysema    Colon Cancer Maternal Grandmother         colon    Cancer Other         ovarian and breast    Breast Cancer Paternal Aunt 20        Social History:   Social History     Socioeconomic History    Marital status:      Spouse name: Not on file    Number of children: 0    Years of education: Not on file    Highest education level: Not on file   Occupational History    Occupation: on disability, former    Tobacco Use    Smoking status: Never    Smokeless tobacco: Never    Tobacco comments:     20 yrs of second hand smoke, mother smoked   Vaping Use    Vaping status: Never Used   Substance and Sexual Activity    Alcohol use: Yes     Alcohol/week: 0.0 - 1.0 standard drinks of alcohol     Comment: occasional    Drug use: Never     Comment: salve    Sexual activity: Not Currently     Partners: Male     Birth  control/protection: Tubal Ligation     Comment: , together since 1994   Other Topics Concern     Service Not Asked    Blood Transfusions Not Asked    Caffeine Concern Yes     Comment: 1-1.5 cup of coffee in am    Occupational Exposure Not Asked    Hobby Hazards Not Asked    Sleep Concern Not Asked    Stress Concern Not Asked    Weight Concern Not Asked    Special Diet Not Asked    Back Care Not Asked    Exercise Yes     Comment: walking dog    Bike Helmet Not Asked    Seat Belt Not Asked    Self-Exams Not Asked   Social History Narrative    Not on file     Social Drivers of Health     Food Insecurity: Not on file   Transportation Needs: Not on file   Stress: Not on file   Housing Stability: Low Risk  (7/7/2021)    [facility]    Housing Stability     Mortgage Payment Concerns?: Not on file     Number of Places Lived in the Last Year: Not on file     Unstable Housing?: Not on file        Allergies: Codeine, Morphine, Sulfa antibiotics, Statins, Alc-benzyl alc-sulfamethoxazole-trimethoprim, Amoxicillin-pot clavulanate, Bactrim [sulfamethoxazole w/trimethoprim], Cefdinir, Cefdinir, Ciprofloxacin, Citric acid, Clarithromycin, Dairy products, Dust, Hydrocodone, Lactose, Other, Pain relief [goodys extra strength], Peppermint flavor, Reglan [metoclopramide], Seasonal, Tomato, and Zomig [zolmitriptan]     Medications:   Current Outpatient Medications   Medication Sig Dispense Refill    budeson-glycopyrrol-formoterol (BREZTRI AEROSPHERE) 160-9-4.8 MCG/ACT Inhalation Aerosol Inhale 2 puffs into the lungs 2 (two) times daily. 10.7 g 5    methylPREDNISolone (MEDROL) 4 MG Oral Tablet Therapy Pack Take as directed 21 each 0    NARATRIPTAN HCL 2.5 MG Oral Tab TAKE 1 TABLET (2.5 MG TOTAL) BY MOUTH AS NEEDED. 8 tablet 2    fluticasone propionate 110 MCG/ACT Inhalation Aerosol Inhale 2 puffs into the lungs 2 (two) times daily. 1 each 1    TIZANIDINE 2 MG  Oral Tab TAKE 1 TABLET BY MOUTH 2 TIMES DAILY AS NEEDED. 180 tablet 0    NEXLIZET 180-10 MG Oral Tab       albuterol 108 (90 Base) MCG/ACT Inhalation Aero Soln Inhale 2 puffs into the lungs every 6 (six) hours as needed. 1 each 2    albuterol (2.5 MG/3ML) 0.083% Inhalation Nebu Soln Take 3 mL (2.5 mg total) by nebulization every 4 (four) hours as needed for Wheezing. 1 each 3    clonazePAM 0.5 MG Oral Tab Take 1-2 tablets (0.5-1 mg total) by mouth nightly as needed for Anxiety. 60 tablet 2    ALPRAZolam 0.5 MG Oral Tab Take 0.5-1 tablets (0.25-0.5 mg total) by mouth daily as needed for Anxiety. 30 tablet 2    amitriptyline 75 MG Oral Tab Take 1 tablet (75 mg total) by mouth nightly. 30 tablet 2    aspirin-acetaminophen-caffeine 250-250-65 MG Oral Tab Take 1 tablet by mouth every 4 (four) hours as needed for Pain.      ezetimibe 10 MG Oral Tab Take 1 tablet (10 mg total) by mouth every evening.      LINZESS 145 MCG Oral Cap Take 1 capsule by mouth daily.      valACYclovir 500 MG Oral Tab       Atogepant 60 MG Oral Tab Take 60 mg by mouth daily. 30 tablet 5    Cholecalciferol (VITAMIN D3) 250 MCG (44287 UT) Oral Cap Take 1 capsule by mouth daily. 3000 international unit      amLODIPine 5 MG Oral Tab Take 1 tablet (5 mg total) by mouth daily.      docusate sodium (DULCOLAX PINK STOOL SOFTENER) 100 MG Oral Cap Take 1 capsule (100 mg total) by mouth 2 (two) times daily. 30 capsule 0    dicyclomine 20 MG Oral Tab Take 1 tablet (20 mg total) by mouth 4 (four) times daily.      estradiol 1 MG Oral Tab       albuterol (PROAIR HFA) 108 (90 Base) MCG/ACT Inhalation Aero Soln Inhale 2 puffs into the lungs every 6 (six) hours as needed for Wheezing. 18 g 1    Esomeprazole Magnesium 20 MG Oral Capsule Delayed Release Take 2 capsules (40 mg total) by mouth nightly.      MULTIVITAMIN TAB/CAP Take 1 tablet by mouth daily.         Review of Systems: Review of Systems    Physical Exam:  /68 (BP Location: Right arm, Patient  Position: Sitting, Cuff Size: adult)   Pulse 88   Resp 16   Ht 5' 6\" (1.676 m)   Wt 178 lb (80.7 kg)   SpO2 98%   BMI 28.73 kg/m²      Constitutional: alert, cooperative. No acute distress.  HEENT: Head NC/AT. Nares normal. Septum midline. Mucosa normal. No drainage or sinus tenderness.. Mallampati 2+  Cardio: Regular rate and rhythm. Normal S1 and S2. No murmurs.   Respiratory: Thorax symmetrical with no labored breathing. clear to auscultation bilaterally  GI: NABS. Abd soft, non-tender.  Extremities: No clubbing or cyanosis. No BLE edema.    Neurologic: A&Ox3. No gross motor deficits.  Skin: Warm, dry  Psych: Calm, cooperative. Pleasant affect.    Results:  Personally reviewed  WBC: 8.7, done on 12/17/2024.  HGB: 13.3, done on 12/17/2024.  PLT: 282, done on 12/17/2024.     Glucose: 96, done on 12/17/2024.  Cr: 0.92, done on 12/17/2024.  Last eGFR was 68 on 12/17/2024.  CA: 9.8, done on 12/17/2024.  Na: 141, done on 12/17/2024.  K: 4.2, done on 12/17/2024.  Cl: 106, done on 12/17/2024.  CO2: 26, done on 12/17/2024.  Last ALB was 4.5% done on 12/17/2024.     CTA CHEST (CPT=71275)    Result Date: 12/17/2024  CONCLUSION:   1. Negative for pulmonary embolism or other acute cardiopulmonary process.   2. No evidence of chronic interstitial lung disease.  Note made of mild central bronchiectasis involving all lobes, likely on a chronic post inflammatory basis.  This has a similar appearance dating back to 2010.    LOCATION:  [facility]   Dictated by (CST): Judy Morrell MD on 12/17/2024 at 5:13 PM     Finalized by (CST): Judy Morrell MD on 12/17/2024 at 5:18 PM       XR CHEST AP PORTABLE  (CPT=71045)    Result Date: 12/17/2024  CONCLUSION:  No active cardiopulmonary process identified.   LOCATION:  [id number]      Dictated by (CST): Vaghani, Clark, MD on 12/17/2024 at 4:20 PM     Finalized by (CST): Clark Hernandez MD on 12/17/2024 at 4:21 PM         Assessment/Plan:  #1. Shortness of breath with bronchiectasis  Check  PFTs  Start breztri with albuterol as needed  Check asthma allergen panel    No follow-ups on file.    Remy Bueno MD  2/10/2025

## 2025-03-04 ENCOUNTER — HOSPITAL ENCOUNTER (OUTPATIENT)
Dept: BONE DENSITY | Age: 70
Discharge: HOME | End: 2025-03-04
Attending: OBSTETRICS & GYNECOLOGY
Payer: MEDICARE

## 2025-03-04 DIAGNOSIS — Z13.820 SCREENING FOR OSTEOPOROSIS: ICD-10-CM

## 2025-03-04 PROCEDURE — 77080 DXA BONE DENSITY AXIAL: CPT | Performed by: OBSTETRICS & GYNECOLOGY

## 2025-03-14 ENCOUNTER — RT VISIT (OUTPATIENT)
Dept: RESPIRATORY THERAPY | Facility: HOSPITAL | Age: 70
End: 2025-03-14
Attending: INTERNAL MEDICINE
Payer: MEDICARE

## 2025-03-14 DIAGNOSIS — J45.40 MODERATE PERSISTENT ASTHMA WITHOUT COMPLICATION (HCC): ICD-10-CM

## 2025-03-14 DIAGNOSIS — R06.02 SHORTNESS OF BREATH: ICD-10-CM

## 2025-03-14 PROCEDURE — 94729 DIFFUSING CAPACITY: CPT | Performed by: INTERNAL MEDICINE

## 2025-03-14 PROCEDURE — 94726 PLETHYSMOGRAPHY LUNG VOLUMES: CPT | Performed by: INTERNAL MEDICINE

## 2025-03-14 PROCEDURE — 94010 BREATHING CAPACITY TEST: CPT | Performed by: INTERNAL MEDICINE

## 2025-03-14 NOTE — PROCEDURES
Patient is a 69-year-old female being assessed with a diagnosis of shortness of breath.    Results    FEV1 2.95 L normal to supranormal at 1.44 (124% of predicted)  FVC 3.71 L normal 1.23(122% of predicted)  Ratio was normal 79%  MVV is normal at 86% of predicted  Flow volume loop with some mild notching inspiratory and expiratory limb    Total lung capacity 6.28 L normal at 1.24  Residual volume 2.14 L normal at 0.21  Diffusion capacity is normal -1.26 (81% of predicted)  When corrected for alveolar volume, diffusion capacity decreases to the mild range -2.13 (70% of predicted)    Impression--no evidence of airways obstruction or restrictive defect.

## 2025-03-19 ENCOUNTER — TELEPHONE (OUTPATIENT)
Facility: CLINIC | Age: 70
End: 2025-03-19

## 2025-03-19 DIAGNOSIS — J45.909 UNCOMPLICATED ASTHMA, UNSPECIFIED ASTHMA SEVERITY, UNSPECIFIED WHETHER PERSISTENT (HCC): Primary | ICD-10-CM

## 2025-03-19 NOTE — TELEPHONE ENCOUNTER
Informed pt that her PFT results have not been interpreted but when they are, she will be able to see them in [facility].  Per Dr. Bueno, pt was advised to have Allergy Panel,  No order in Epic.  Order placed and pt advised to call insurance to check coverage.  Provided CPT code to pt.  Advised pt to schedule office visit to review results.

## 2025-03-20 DIAGNOSIS — M62.89 MUSCLE TIGHTNESS: ICD-10-CM

## 2025-03-20 NOTE — TELEPHONE ENCOUNTER
Medication:  TIZANIDINE 2 MG Oral Tab      Date of last refill: 12/19/2024 (#180/0)  [place]     Last office visit: 12/09/2024  Due back to clinic per last office note:  Around 03/03/2025  Date next office visit scheduled:    Future Appointments   Date Time Provider Department Center   5/22/2025  2:30 PM Iva Nguyen MD [facility]           Last OV note recommendation:       ASSESSMENT/PLAN:          ICD-10-CM     1. Chronic migraine without aura with status migrainosus, not intractable  G43.701         2. DDD (degenerative disc disease), cervical  M50.30 Physical Therapy Referral - [facility]                       Chronic headache disorder mixed migraines and neck tension headache  Superimposed left temporal stabbing\" icepick\" headaches     CTA head was negative for acute abnormality.  CTA head and neck unremarkable there is mild tortuosity noted in the left distal carotid artery but then nothing definitive for FMD. No cerebral aneurysms     Patient had tried multiple preventive agents for migraines including CGRP monthly injections and Botox.    Start Botox therapy     Refer to PT for neck pain/stiffness

## 2025-03-20 NOTE — TELEPHONE ENCOUNTER
Per Christine ARMAS RN, correct CPT code for allergy panel is 39998. Patient called, made aware code will be used multiple times depending on how many things is included in the test. Discussed with patient and advised to get allergy panel done if covered by insurance and then call our office to schedule a follow up appointment with Dr. Bueno or RINA Singh, they will then discuss PFT and allergy testing at that appointment. Patient verbalized understanding.

## 2025-03-20 NOTE — TELEPHONE ENCOUNTER
Received call from patient, reports called insurance to check if needs prior authorization for allergy testing, was informed doctors office needs to call her plan to obtain prior auth. [employer] called at [phone], unable to speak with agent. Provided information via automated system, testing facility, provider NPI, patient information, asthma diagnosis code, cpt code 19596 for allergens zone 8 , per automated system precertifcation not required for this test, however not a guaranteed of coverage and payment. Patient called, informed unable to speak with live agent. Informed there is a difference between prior authorization and precertification. Although precert not required unsure if need prior auth to ensure test is necessary and covered by plan. Patient reports being unsure of getting lab work done, states if the end goal is shots, she will not do shots. Explained Dr. Bueno reviews allergy testing and PFT results to help ensure adequate plan of care. Patient reports willing to do pulmonary rehab to help build stamina and hoping that it helps with her dyspnea. Patient reports currently not as active. Patient would not like to have shots, states has had it in the past with no relief. States previously followed with [facility], will call them and try to obtain records to be faxed to our office. Patient made aware will notify Dr. Bueno and call back with physician recommendations.

## 2025-03-21 RX ORDER — TIZANIDINE 2 MG/1
2 TABLET ORAL 2 TIMES DAILY PRN
Qty: 180 TABLET | Refills: 0 | Status: SHIPPED | OUTPATIENT
Start: 2025-03-21

## 2025-03-24 ENCOUNTER — TELEPHONE (OUTPATIENT)
Facility: CLINIC | Age: 70
End: 2025-03-24

## 2025-03-24 ENCOUNTER — NURSE TRIAGE (OUTPATIENT)
Dept: FAMILY MEDICINE CLINIC | Facility: CLINIC | Age: 70
End: 2025-03-24

## 2025-03-24 NOTE — TELEPHONE ENCOUNTER
Pt scheduled via [employer]. Please triage     Appointment for: Zakmanuelkiarra Smithjustynkaity ([id number])   Visit type: MYCHART EXAM (2964)   4/7/2025 12:40 PM (20 minutes) with Olga Lidia Romero in [facility] [address] [place] [address]      Patient comments:   Lump under breast

## 2025-03-24 NOTE — TELEPHONE ENCOUNTER
Action Requested: Summary for Provider     []  Critical Lab, Recommendations Needed  [x] Need Additional Advice  []   FYI    []   Need Orders  [] Need Medications Sent to Pharmacy  []  Other     SUMMARY: Pt unable to come in next week for sooner apt. Pt is okay with apt as scheduled. Said she would be able to come in this week if needed (unable to come in next week). Ok to see her 4/7/25 or do you want her seen sooner?     Reason for call: Lump  Onset: Years  Reason for Disposition   Small swelling or lump present > 1 week     Ongoing for years    Protocols used: Skin Lump or Localized Swelling-A-OH    Bump is not in breast, under breast on rib area  Has had bump for years. Was told by gyne is a \"lipoma\"  Lately has been itchy   Itching so hard skin is bruised   Bump is bothersome and \"bugging her\"   Some swelling which pt states is from her itching area. Also is red due to itching  Denies fever. No rash or pus   Size has not changed. About 2 inches long   Had gallbladder out and is wondering if this could be scar tissue  Offered apt next week, pt unable to come in. Advised to call with any change in symptoms

## 2025-03-24 NOTE — TELEPHONE ENCOUNTER
Called and spoke with pt. Notified pt Dr. Romero states ok to keep apt as scheduled. Provided pt with signs to look out for and when to reach out to us, increased swelling or redness, pain, fever, rash, etc. Pt verbalizes understanding and is agreeable to plan. Pt appreciative of follow up.    Terbinafine Counseling: Patient counseling regarding adverse effects of terbinafine including but not limited to headache, diarrhea, rash, upset stomach, liver function test abnormalities, itching, taste/smell disturbance, nausea, abdominal pain, and flatulence.  There is a rare possibility of liver failure that can occur when taking terbinafine.  The patient understands that a baseline LFT and kidney function test may be required. The patient verbalized understanding of the proper use and possible adverse effects of terbinafine.  All of the patient's questions and concerns were addressed.

## 2025-03-26 ENCOUNTER — TELEPHONE (OUTPATIENT)
Facility: CLINIC | Age: 70
End: 2025-03-26

## 2025-03-26 PROBLEM — J45.40 MODERATE PERSISTENT ASTHMA WITHOUT COMPLICATION (HCC): Status: ACTIVE | Noted: 2025-03-26

## 2025-03-29 DIAGNOSIS — G43.701 CHRONIC MIGRAINE WITHOUT AURA WITH STATUS MIGRAINOSUS, NOT INTRACTABLE: ICD-10-CM

## 2025-03-31 RX ORDER — NARATRIPTAN 2.5 MG/1
2.5 TABLET ORAL AS NEEDED
Qty: 8 TABLET | Refills: 2 | Status: SHIPPED | OUTPATIENT
Start: 2025-03-31

## 2025-03-31 NOTE — TELEPHONE ENCOUNTER
Medication:  NARATRIPTAN HCL 2.5 MG Oral Tab      Date of last refill: 01/08/2025 (#8/2)  [place]     Last office visit: 12/09/2024  Due back to clinic per last office note:  12 Weeks   Date next office visit scheduled:    Future Appointments   Date Time Provider Department Center   4/7/2025 12:40 PM Olga Lidia Romero,  [facility]   4/28/2025  2:30 PM Remy Bueno MD [id number]   5/22/2025  2:30 PM Iva Nguyen MD [id number]           Last OV note recommendation:       ASSESSMENT/PLAN:          ICD-10-CM     1. Chronic migraine without aura with status migrainosus, not intractable  G43.701         2. DDD (degenerative disc disease), cervical  M50.30 Physical Therapy Referral - [facility]                       Chronic headache disorder mixed migraines and neck tension headache  Superimposed left temporal stabbing\" icepick\" headaches     CTA head was negative for acute abnormality.  CTA head and neck unremarkable there is mild tortuosity noted in the left distal carotid artery but then nothing definitive for FMD. No cerebral aneurysms     Patient had tried multiple preventive agents for migraines including CGRP monthly injections and Botox.    Start Botox therapy     Refer to PT for neck pain/stiffness        Leo Burton MD  [facility]

## 2025-04-07 ENCOUNTER — OFFICE VISIT (OUTPATIENT)
Dept: FAMILY MEDICINE CLINIC | Facility: CLINIC | Age: 70
End: 2025-04-07
Payer: MEDICARE

## 2025-04-07 VITALS
RESPIRATION RATE: 16 BRPM | TEMPERATURE: 98 F | OXYGEN SATURATION: 97 % | WEIGHT: 181.13 LBS | HEART RATE: 74 BPM | SYSTOLIC BLOOD PRESSURE: 122 MMHG | DIASTOLIC BLOOD PRESSURE: 70 MMHG | HEIGHT: 66 IN | BODY MASS INDEX: 29.11 KG/M2

## 2025-04-07 DIAGNOSIS — E78.2 MIXED HYPERLIPIDEMIA: ICD-10-CM

## 2025-04-07 DIAGNOSIS — R68.2 DRY MOUTH: ICD-10-CM

## 2025-04-07 DIAGNOSIS — E55.9 VITAMIN D DEFICIENCY: ICD-10-CM

## 2025-04-07 DIAGNOSIS — R73.03 PREDIABETES: ICD-10-CM

## 2025-04-07 DIAGNOSIS — R22.9 LOCALIZED SUPERFICIAL SWELLING, MASS, OR LUMP: Primary | ICD-10-CM

## 2025-04-07 PROCEDURE — 1159F MED LIST DOCD IN RCRD: CPT | Performed by: FAMILY MEDICINE

## 2025-04-07 PROCEDURE — 99214 OFFICE O/P EST MOD 30 MIN: CPT | Performed by: FAMILY MEDICINE

## 2025-04-07 RX ORDER — LINACLOTIDE 72 UG/1
CAPSULE, GELATIN COATED ORAL
COMMUNITY
Start: 2025-04-06

## 2025-04-07 NOTE — PROGRESS NOTES
Subjective:   Carl Alaniz is a 69 year old female who presents for Lump (Upper abdominal . Mouth dryness . ) and Other     The following individual(s) verbally consented to be recorded using ambient AI listening technology and understand that they can each withdraw their consent to this listening technology at any point by asking the clinician to turn off or pause the recording:    Patient name: Carl Alaniz     History of Present Illness  The patient is a 69 year old who presents with c/o lump under the R rib cage and dry mouth.    She has had a lump under her rib cage for a couple of years, which has recently become bothersome due to persistent itching. The lump is movable and located at the bottom of her R rib cage. There is no significant pain, although it occasionally feels achy. She had gallbladder surgery in the past and wonders if the lump could be related to scar tissue. There has been no noticeable increase in the size of the lump.    She reports dry mouth over the past few months causing her to drink more water. She recalls being told she was 'pre-diabetic' with a previous A1c of 5.8. However, she attributes her thirst possibly to mouth breathing due to nasal congestion.    She is taking Repatha for high cholesterol, which she has been on for three months, and is interested in checking her cholesterol levels to see if there has been any improvement.     She also takes 2000 IU of vitamin D daily, as her levels were previously low.      History/Other:    Chief Complaint Reviewed and Verified  Nursing Notes Reviewed and   Verified  Tobacco Reviewed  Medications Reviewed  Problem List Reviewed    OB Status Reviewed         Tobacco:  She has never smoked tobacco.    Current Outpatient Medications   Medication Sig Dispense Refill    LINZESS 72 MCG Oral Cap       NARATRIPTAN HCL 2.5 MG Oral Tab TAKE 1 TABLET (2.5 MG TOTAL) BY MOUTH AS NEEDED. 8 tablet 2    TIZANIDINE 2 MG Oral Tab TAKE 1 TABLET  BY MOUTH 2 TIMES DAILY AS NEEDED. 180 tablet 0    REPATHA SURECLICK 140 MG/ML Subcutaneous Solution Auto-injector Inject 140 mg into the skin every 14 (fourteen) days.      amitriptyline 75 MG Oral Tab Take 1 tablet (75 mg total) by mouth nightly. 30 tablet 2    clonazePAM 0.5 MG Oral Tab Take 1-2 tablets (0.5-1 mg total) by mouth nightly as needed for Anxiety. 60 tablet 2    ALPRAZolam 0.5 MG Oral Tab Take 0.5-1 tablets (0.25-0.5 mg total) by mouth daily as needed for Anxiety. 30 tablet 2    budeson-glycopyrrol-formoterol (BREZTRI AEROSPHERE) 160-9-4.8 MCG/ACT Inhalation Aerosol Inhale 2 puffs into the lungs 2 (two) times daily. 10.7 g 5    fluticasone propionate 110 MCG/ACT Inhalation Aerosol Inhale 2 puffs into the lungs 2 (two) times daily. 1 each 1    albuterol (2.5 MG/3ML) 0.083% Inhalation Nebu Soln Take 3 mL (2.5 mg total) by nebulization every 4 (four) hours as needed for Wheezing. 1 each 3    aspirin-acetaminophen-caffeine 250-250-65 MG Oral Tab Take 1 tablet by mouth every 4 (four) hours as needed for Pain.      valACYclovir 500 MG Oral Tab       Cholecalciferol (VITAMIN D3) 250 MCG (35811 UT) Oral Cap Take 1 capsule by mouth daily. 3000 international unit      amLODIPine 5 MG Oral Tab Take 1 tablet (5 mg total) by mouth daily.      docusate sodium (DULCOLAX PINK STOOL SOFTENER) 100 MG Oral Cap Take 1 capsule (100 mg total) by mouth 2 (two) times daily. 30 capsule 0    dicyclomine 20 MG Oral Tab Take 1 tablet (20 mg total) by mouth 4 (four) times daily.      estradiol 1 MG Oral Tab       albuterol (PROAIR HFA) 108 (90 Base) MCG/ACT Inhalation Aero Soln Inhale 2 puffs into the lungs every 6 (six) hours as needed for Wheezing. 18 g 1    Esomeprazole Magnesium 20 MG Oral Capsule Delayed Release Take 2 capsules (40 mg total) by mouth nightly.      MULTIVITAMIN TAB/CAP Take 1 tablet by mouth daily.           Review of Systems:  Review of Systems   HENT:  Positive for congestion.         Dry mouth    Respiratory:  Negative for shortness of breath.    Cardiovascular:  Negative for chest pain and palpitations.   Endocrine: Negative for polyphagia and polyuria.            Skin:         Per hpi   Neurological:  Negative for light-headedness and headaches.       Objective:   /70   Pulse 74   Temp 97.9 °F (36.6 °C) (Temporal)   Resp 16   Ht 5' 6\" (1.676 m)   Wt 181 lb 2 oz (82.2 kg)   SpO2 97%   BMI 29.23 kg/m²  Estimated body mass index is 29.23 kg/m² as calculated from the following:    Height as of this encounter: 5' 6\" (1.676 m).    Weight as of this encounter: 181 lb 2 oz (82.2 kg).  Physical Exam  Vitals and nursing note reviewed.   Constitutional:       Appearance: Normal appearance.   HENT:      Head: Normocephalic and atraumatic.      Mouth/Throat:      Mouth: Mucous membranes are moist.   Eyes:      Extraocular Movements: Extraocular movements intact.      Pupils: Pupils are equal, round, and reactive to light.   Cardiovascular:      Rate and Rhythm: Normal rate and regular rhythm.   Pulmonary:      Effort: Pulmonary effort is normal.      Breath sounds: Normal breath sounds. No wheezing, rhonchi or rales.   Skin:     General: Skin is warm and dry.      Comments: Small mass, mobile noted over R lower ribcage, non-tender   Neurological:      Mental Status: She is alert and oriented to person, place, and time.   Psychiatric:         Mood and Affect: Mood normal.         Behavior: Behavior normal.         Assessment & Plan:   1. Localized superficial swelling, mass, or lump  - discussed possible etiologies and seems more consistent with lipoma  - will obtain US for better evaluation/dx  - instructed to use otc hydrocortisone to help with the itch or aloe vera gel  - US CHEST (CPT=76604); Future    2. Dry mouth  - likely due to medication SE of nasal congestion    3. Mixed hyperlipidemia  - cont Repatha  - will check labs  - work on low chol diet and regular exercise  - Comp Metabolic Panel (14)  [E]; Future  - Lipid Panel [E]; Future    4. Prediabetes  - Hemoglobin A1C [E]; Future    5. Vitamin D deficiency  - cont vit d3 2000iu daily otc   - Vitamin D [E]; Future    She understands and agrees with tx plan  RTC after completing testing, sooner if needed

## 2025-04-28 ENCOUNTER — TELEMEDICINE (OUTPATIENT)
Facility: CLINIC | Age: 70
End: 2025-04-28
Payer: MEDICARE

## 2025-04-28 DIAGNOSIS — R06.02 SHORTNESS OF BREATH: Primary | ICD-10-CM

## 2025-04-28 NOTE — PROGRESS NOTES
[facility] Pulmonary Follow Up Note    This visit is conducted using Telemedicine with live, interactive video and audio.    Patient has been referred to the Critical access hospital website at [email or web address] to review the yearly Consent to Treat document.         The following individual(s) verbally consented to be recorded using ambient AI listening technology and understand that they can each withdraw their consent to this listening technology at any point by asking the clinician to turn off or pause the recording:    Patient name: Carl Alaniz  Additional names:        Chief Complaint:  Chief Complaint   Patient presents with    Dyspnea       History of Present Illness:  History of Present Illness  Zak Alaniz is a 69 year old female who presents for follow-up after pulmonary testing.    Her recent pulmonary test results were excellent, and her breathing has been very good. Previously, she experienced shortness of breath, which has since resolved. During that period, she used albuterol but has stopped as the symptoms have subsided.    She described the shortness of breath as difficulty 'pushing the air out,' especially during activities like climbing stairs or carrying laundry. This issue has significantly improved, and she no longer needs to sit down to catch her breath during such activities.    She sometimes experiences similar symptoms in the spring and fall, but this was the first time she had to sit down due to shortness of breath.         Past Medical History:   Past Medical History[1]     Past Surgical History:   Past Surgical History[2]    Family Medical History: Family History[3]     Social History:   Social History     Socioeconomic History    Marital status:      Spouse name: Not on file    Number of children: 0    Years of education: Not on file    Highest education level: Not on file   Occupational History    Occupation: on disability, former    Tobacco Use    Smoking status: Never      Passive exposure: Never    Smokeless tobacco: Never    Tobacco comments:     20 yrs of second hand smoke, mother smoked   Vaping Use    Vaping status: Never Used   Substance and Sexual Activity    Alcohol use: Yes     Alcohol/week: 0.0 - 1.0 standard drinks of alcohol     Comment: occasional    Drug use: Never     Comment: salve    Sexual activity: Not Currently     Partners: Male     Birth control/protection: Tubal Ligation     Comment: , together since 1994   Other Topics Concern     Service Not Asked    Blood Transfusions Not Asked    Caffeine Concern Yes     Comment: 1-1.5 cup of coffee in am    Occupational Exposure Not Asked    Hobby Hazards Not Asked    Sleep Concern Not Asked    Stress Concern Not Asked    Weight Concern Not Asked    Special Diet Not Asked    Back Care Not Asked    Exercise Yes     Comment: walking dog    Bike Helmet Not Asked    Seat Belt Not Asked    Self-Exams Not Asked   Social History Narrative    Not on file     Social Drivers of Health     Food Insecurity: No Food Insecurity (4/7/2025)    NCSS - Food Insecurity     Worried About Running Out of Food in the Last Year: No     Ran Out of Food in the Last Year: No   Transportation Needs: No Transportation Needs (4/7/2025)    NCSS - Transportation     Lack of Transportation: No   Stress: Not on file   Housing Stability: Not At Risk (4/7/2025)    NCSS - Housing/Utilities     Has Housing: Yes     Worried About Losing Housing: No     Unable to Get Utilities: No        Medications: Current Medications[4]    Review of Systems: Review of Systems     Physical Exam:  There were no vitals taken for this visit.     Physcial exam deferred due to video visit    Results:  Images personally reviewed - reading is my own personal review  Results  DIAGNOSTIC  Pulmonary function test: Normal (04/28/2025)       PFTs:       No data to display                   No data to display                    WBC: 8.7, done on 12/17/2024.  HGB: 13.3, done  on 12/17/2024.  PLT: 282, done on 12/17/2024.     Glucose: 96, done on 12/17/2024.  Cr: 0.92, done on 12/17/2024.  Last eGFR was 68 on 12/17/2024.  CA: 9.8, done on 12/17/2024.  Na: 141, done on 12/17/2024.  K: 4.2, done on 12/17/2024.  Cl: 106, done on 12/17/2024.  CO2: 26, done on 12/17/2024.  Last ALB was 4.5% done on 12/17/2024.     XR DEXA BONE DENSITOMETRY (CPT=77080)  Result Date: 3/4/2025  CONCLUSION:  Bone mineral density values for femoral neck are compatible with the diagnosis of osteopenia by WHO definition (T score between -1.0 and -2.5).  If therapy is initiated, follow-up study is recommended in 2 years for further evaluation of therapeutic efficacy.   There is a 10 year fracture risk of 14 percent for major osteoporotic fracture and 1.2 percent for hip fracture.  Recommendation:  The Bone Health and Osteoporosis Foundation (BHOF) recommends pharmacological treatment for patients with a FRAX 10-year risk score of 3% or higher for a hip fracture or 20% or higher for a major osteoporotic fracture, to prevent osteoporosis and reduce fracture risk. All treatment decisions require clinical judgment and consideration of individual patient factors, including patient preferences, comorbidities, previous drug use, risk fractures not captured in the FRAX model (e.g. frailty, falls, vitamin D deficiency, increased bone turnover, interval significant decline in bone density) and possible under- or over-estimation of fracture risk by FRAX. Additional information regarding the FRAX model can be found at the BHOF website: bonehealthandosteoporosis.org.  The World Health Organization has defined the following categories based on bone density: Normal bone:  T-score greater than or equal to -1 Osteopenia: T-score  less than -1 and greater  than -2.5 Osteoporosis:  T-score less than or equal -2.5   LOCATION:  [facility]     Dictated by (CST): Judy Morrell MD on 3/04/2025 at 1:50 PM     Finalized by (CST): Judy Morrell MD on  3/04/2025 at 1:50 PM          Assessment/Plan:  Assessment & Plan  Shortness of breath  Resolved. Normal pulmonary function tests. Likely transient viral infection or allergies.  - Discontinue Breztri Aerosphere if symptoms remain resolved.  - Use albuterol as needed for future dyspnea.  - Contact clinic if dyspnea recurs or concerns arise.         No follow-ups on file.    I spent 20 minutes obtaining and reviewing records, preparing for the visit including reviewing chart and prior testing, face to face time examining the patient and obtaining history, counseling, arranging and reviewing office-based testing, independently reviewing relevant studies and documentation exclusive of other billable procedures.      Remy Bueno MD  4/28/2025         [1]   Past Medical History:   Abnormal Holter monitor finding    rare PAC's and PVC's    Abnormal MRI, cervical spine    uncovertebral degenerative change is suggested, right-sided C2 C3, and left sided C4 C5 with disc bulging noted also at the C4 C5 level.  small central protrusions effacing the subarachnoid space C5 C6 and C6 C7    Chest pain    with exercises    Chronic fatigue syndrome    Colitis    sigmoid erythema consistent with nonspecific colitis    Decreased sexual desire    Depression with anxiety    psychiatry: Dr. Iva Nguyen    Diverticulosis    scattered, minimal    Endometriosis    s/p ablation therapy    Fibromyalgia    History of colon polyps    GI: Dr. Abisai Bay    History of femur fracture    s/p closed reduction percutaneous screw fixation of right femoral neck fracture  internal fixation of the right hip    HTN (hypertension)    Hyperlipidemia    IBD (inflammatory bowel disease)    IBS (irritable bowel syndrome)    chronic, recurrent. GI: Dr. Abisai Bay    Intervertebral disc protrusion    L5/S1 small central protrusion effacing the thecal sax with a small annular fissure in the posterior annular fibers    Migraines    neuro:   Leo Lynqtadaezio    Nephrolithiasis    Osteopenia    Palpitations    likely anxiety related to dystrophic nails secondary to mechanical injury     Postmenopausal HRT (hormone replacement therapy)    Seasonal allergies    Vitamin D deficiency   [2]   Past Surgical History:  Procedure Laterality Date    Cholecystectomy  11/10    bile leak, sepsis, pancreatitis    Colonoscopy  10/16, 4/06, 9/00, 8/97 2000 polyps, 2006 no polyps    Colonoscopy  11/2019    Colonoscopy N/A 11/14/2019    Procedure: COLONOSCOPY, POSSIBLE BIOPSY, POSSIBLE POLYPECTOMY 09521;  Surgeon: Abisai Bay MD;  [facility]    Egd scope  2000, 3/18    normal, empiric dilation    Endometr ablate, thermal  2000    Ercp,diagnostic  2/11    Laparoscopy,lysis adhesns      endometirosis    Leg/ankle surgery proc unlisted  4/18/2012    closed reduction percutaneous screw fixation of right femoral neck fracture  internal fixation of the right hip    Other  April 2016    Cervical Facet injection, Ganglion block    Removal gallbladder      Tubal ligation     [3]   Family History  Problem Relation Age of Onset    Lung Disorder Mother         COPD, emphysema    Colon Cancer Maternal Grandmother         colon    Cancer Other         ovarian and breast    Breast Cancer Paternal Aunt 20   [4]   Current Outpatient Medications   Medication Sig Dispense Refill    LINZESS 72 MCG Oral Cap       NARATRIPTAN HCL 2.5 MG Oral Tab TAKE 1 TABLET (2.5 MG TOTAL) BY MOUTH AS NEEDED. 8 tablet 2    TIZANIDINE 2 MG Oral Tab TAKE 1 TABLET BY MOUTH 2 TIMES DAILY AS NEEDED. 180 tablet 0    REPATHA SURECLICK 140 MG/ML Subcutaneous Solution Auto-injector Inject 140 mg into the skin every 14 (fourteen) days.      amitriptyline 75 MG Oral Tab Take 1 tablet (75 mg total) by mouth nightly. 30 tablet 2    clonazePAM 0.5 MG Oral Tab Take 1-2 tablets (0.5-1 mg total) by mouth nightly as needed for Anxiety. 60 tablet 2    ALPRAZolam 0.5 MG Oral Tab Take 0.5-1  tablets (0.25-0.5 mg total) by mouth daily as needed for Anxiety. 30 tablet 2    budeson-glycopyrrol-formoterol (BREZTRI AEROSPHERE) 160-9-4.8 MCG/ACT Inhalation Aerosol Inhale 2 puffs into the lungs 2 (two) times daily. 10.7 g 5    fluticasone propionate 110 MCG/ACT Inhalation Aerosol Inhale 2 puffs into the lungs 2 (two) times daily. 1 each 1    albuterol (2.5 MG/3ML) 0.083% Inhalation Nebu Soln Take 3 mL (2.5 mg total) by nebulization every 4 (four) hours as needed for Wheezing. 1 each 3    aspirin-acetaminophen-caffeine 250-250-65 MG Oral Tab Take 1 tablet by mouth every 4 (four) hours as needed for Pain.      valACYclovir 500 MG Oral Tab       Cholecalciferol (VITAMIN D3) 250 MCG (69135 UT) Oral Cap Take 1 capsule by mouth daily. 3000 international unit      amLODIPine 5 MG Oral Tab Take 1 tablet (5 mg total) by mouth daily.      docusate sodium (DULCOLAX PINK STOOL SOFTENER) 100 MG Oral Cap Take 1 capsule (100 mg total) by mouth 2 (two) times daily. 30 capsule 0    dicyclomine 20 MG Oral Tab Take 1 tablet (20 mg total) by mouth 4 (four) times daily.      estradiol 1 MG Oral Tab       albuterol (PROAIR HFA) 108 (90 Base) MCG/ACT Inhalation Aero Soln Inhale 2 puffs into the lungs every 6 (six) hours as needed for Wheezing. 18 g 1    Esomeprazole Magnesium 20 MG Oral Capsule Delayed Release Take 2 capsules (40 mg total) by mouth nightly.      MULTIVITAMIN TAB/CAP Take 1 tablet by mouth daily.

## 2025-05-12 ENCOUNTER — PATIENT MESSAGE (OUTPATIENT)
Dept: PAIN CLINIC | Facility: CLINIC | Age: 70
End: 2025-05-12

## 2025-05-13 ENCOUNTER — TELEMEDICINE (OUTPATIENT)
Dept: TELEHEALTH | Age: 70
End: 2025-05-13
Payer: MEDICARE

## 2025-05-13 ENCOUNTER — NURSE TRIAGE (OUTPATIENT)
Dept: FAMILY MEDICINE CLINIC | Facility: CLINIC | Age: 70
End: 2025-05-13

## 2025-05-13 DIAGNOSIS — Z91.199 NO-SHOW FOR APPOINTMENT: Primary | ICD-10-CM

## 2025-05-13 PROCEDURE — 1160F RVW MEDS BY RX/DR IN RCRD: CPT | Performed by: PHYSICIAN ASSISTANT

## 2025-05-13 PROCEDURE — 1159F MED LIST DOCD IN RCRD: CPT | Performed by: PHYSICIAN ASSISTANT

## 2025-05-13 NOTE — PROGRESS NOTES
Multiple notifications, direct link, and "[employer]hart Message sent to patient to connect to visit.  After 20 minutes of waiting on video visit patent did not connect.   Referred patient in-person if having technical difficulties.

## 2025-05-13 NOTE — TELEPHONE ENCOUNTER
Action Requested: Summary for Provider     []  Critical Lab, Recommendations Needed  [] Need Additional Advice  []   FYI    []   Need Orders  [] Need Medications Sent to Pharmacy  []  Other     SUMMARY: Patient with body aches, pressure in ears, sore throat, congestion, and productive cough with yellow/green sputum. Advised patient to be seen in walk in clinic today, patient agreeable.   Patient reports some lightheadedness due to ear symptoms when she stands up, when she sits it goes away. Advised patient if lightheadedness persists or gets worse she will need to be seen as soon as possible in urgent care or emergency room, patient verbalized understanding.     Reason for call: Upper Respiratory Infection  Onset: April 12    -Patient taking mucinex and advil over the counter and is staying well hydrated    -Patient would like to do video visit, advised her she will need to be evaluated in person especially with ear symptoms provider will need to look in her ears to see if she has an infection   -Her temperature today is 99 F           Reason for Disposition   Sinus congestion (pressure, fullness) present > 10 days    Protocols used: Common Cold-A-[place]

## 2025-05-14 ENCOUNTER — NURSE TRIAGE (OUTPATIENT)
Dept: FAMILY MEDICINE CLINIC | Facility: CLINIC | Age: 70
End: 2025-05-14

## 2025-05-14 NOTE — TELEPHONE ENCOUNTER
Action Requested: Summary for Provider     []  Critical Lab, Recommendations Needed  [] Need Additional Advice  []   FYI    []   Need Orders  [] Need Medications Sent to Pharmacy  []  Other     SUMMARY: Patient called stating that she was positive for Covid yesterday. States that she no longer has a fever or shortness of breath. States that she had body aches yesterday but they have subsided today. Patient asking more information regarding COVID precautions and isolation. Patient was given CDC recommendations regarding isolation and treatment. Patient verbalizes understanding. ER precautions given, verbalizes understanding.     Reason for call: Covid  Onset:5/13/25                     Reason for Disposition   Colds with no complications    Protocols used: Common Cold-A-[place]

## 2025-05-21 NOTE — TELEPHONE ENCOUNTER
Current refill pending for Naratriptan for provider review. Pt notified via [address]       RN, please refuse as this is a duplicate request.
Duplicate request for naratriptan
Pt calling again to confirm that refill request for Naratriptan will be filled today. She states she is out of medication.
Spoke with pt, notified that due to the multiple requests we have received, [facility] denials as multiple requested received. Pt assured that there is an active pending refill that has been sent to provider for review.     RN, please refuse, duplicate request.
alert

## 2025-06-02 ENCOUNTER — OFFICE VISIT (OUTPATIENT)
Dept: PAIN CLINIC | Facility: CLINIC | Age: 70
End: 2025-06-02
Payer: MEDICARE

## 2025-06-02 VITALS
OXYGEN SATURATION: 98 % | BODY MASS INDEX: 29.09 KG/M2 | HEIGHT: 66 IN | HEART RATE: 92 BPM | WEIGHT: 181 LBS | SYSTOLIC BLOOD PRESSURE: 118 MMHG | DIASTOLIC BLOOD PRESSURE: 70 MMHG | RESPIRATION RATE: 18 BRPM

## 2025-06-02 DIAGNOSIS — G89.29 CHRONIC JOINT PAIN: Primary | ICD-10-CM

## 2025-06-02 DIAGNOSIS — M16.11 ARTHRITIS OF RIGHT HIP: ICD-10-CM

## 2025-06-02 DIAGNOSIS — M54.12 CERVICAL RADICULAR PAIN: ICD-10-CM

## 2025-06-02 DIAGNOSIS — M25.50 CHRONIC JOINT PAIN: Primary | ICD-10-CM

## 2025-06-02 DIAGNOSIS — M47.812 CERVICAL SPONDYLOSIS: ICD-10-CM

## 2025-06-02 DIAGNOSIS — M50.30 DEGENERATION OF CERVICAL DISC WITHOUT MYELOPATHY: ICD-10-CM

## 2025-06-02 DIAGNOSIS — M79.18 CERVICAL MYOFASCIAL PAIN SYNDROME: ICD-10-CM

## 2025-06-02 RX ORDER — PREDNISONE 10 MG/1
TABLET ORAL
Qty: 30 TABLET | Refills: 0 | Status: SHIPPED | OUTPATIENT
Start: 2025-06-02 | End: 2025-06-14

## 2025-06-02 NOTE — PROGRESS NOTES
Patient presents in office today with reported pain in neck and right hip     Current pain level reported = 8/10    Last reported dose of tizanidine took it this morning       Narcotic Contract renewal N/A    Urine Drug screen N/A

## 2025-06-02 NOTE — PROGRESS NOTES
HPI:   Carl Alaniz presents in clinic in follow-up.  Patient has not been seen since 2024.  At that time she was complaining of left neck pain and right hip pain.  She was provided an oral Medrol pack asked to update her cervical x-rays for consideration of left cervical medial branch blocks and monitor right hip bursa pain after Medrol however patient did not follow-up in clinic to review x-rays or response to the Medrol.  She did complete physical therapy in August 2024.  Continue seeing neurology.  For migraines.  Patient followed up October 29, 2024 and was recommended for left C5-6 C6-7 medial branch block however patient never did follow-up in clinic.    Today patient presents with complaints of neck pain and right hip pain.    The pain is described as moderate aching, burning, stabbing, grabbing, shooting, soreness that is constant .  The patient’s activity level has remained the same since last visit.  The pain is worst in the early morning, in the late evening.    Changes in condition/history since last visit: Patient recently had COVID    Last procedure: Left cervical facet injections at C5-6 and C6-7    date: October 31, 2023  After injection patient did report a migraine headache via [employer] message reporting severe pain on and off on the left side of the temple.  Patient was responded to that this may be a coincidence as she does have a history of chronic migraines.  She did follow-up with neurology.  Notes indicate patient had failed Botox and [facility] she was given a trial of Indocin.  She presented to the emergency room January 24, 2024 and told to use Tylenol alternating with ibuprofen hydrate follow-up with neurology.  Patient was then started on venlafaxine.  She was already on amitriptyline    Percentage of relief experienced from the procedure: Unable to recall %    Duration of the relief: Not applicable    The following activities will increase the patient’s pain:  walking, standing, household chores, outside yard work, staying in the same position for prolonged periods of time, transitioning, any prolonged activity    The following activities decrease the patient’s pain: medications, heat, limiting activity level, stretching, changing position, TENS unit on neck    Functional Assessment: Patient reports that they are able to complete all of their ADL's such as eating, bathing, using the toilet, dressing and getting up from a bed or a chair independently.    Current Medications:  Current Medications[1]   Patient requires assistance with: No assistance required    Reviewed Patient History Dated: Reviewed since last visit  Have you recently had any feelings of harming yourself or others? The patient's response was no.    Physical Exam:   /70   Pulse 92   Resp 18   Ht 66\"   Wt 181 lb (82.1 kg)   SpO2 98%   BMI 29.21 kg/m²   VAS Pain Score: 8 /10  General Appearance: Well developed, well nourished, normal build, independent body habitus, no apparent physical disabilities, well groomed    Neurological Exam: WNL-Orientation to time, place and person, normal mood & effect, normal concentration & attention span    Inspection:   + Positive pain with right hip flexion and external rotation  Bilateral lower extremities 5 out of 5  Mildly antalgic gait  No gross motor or sensory deficits to bilateral lower extremities  Coordination:  Well coordinated; able to engage in rapid alternating movements bilateral upper extremities    ROM Cervical Spine:  See chart below:  Motion Right (+ or -) Left (+ or -)   Cervical flexion - +   Cervical extension - +   Cervical lateral bending - +   Cervical rotation - +     Integument:  Skin over area of cervical spine intact; no erythema, rashes, excoriations, lesions noted    Palpation:  No pain to palpation bilateral cervical and thoracic facets, paraspinal musculature, trapezius, or scapulae  Palpation of Right (+ or -) Left (+ or -)    Cervical Facets - +   Thoracic Facets - -   Paraspinal - +   Trapezius - +   Scapula - -     DTR:  DTR grossly intact throughout bilateral upper extremities, 2/4 throughout    Strength:  Strength of bilateral upper extremities grossly intact; 5/5 throughout    Sensation:  Normal sensation noted to light touch and pressure throughout bilateral upper extremities.    Tests:  Test Right (+ or -) Left (+ or -)   Spurling - -   Maya’s Test - -   Clonus - -     Is there a focal area of pain in a skeletal muscle?: yes  Clinical evidence of a hyperirritable spot/taut band AND exam confirmation of a hypersensitive bundle/hard nodule producing pain: left upper trap and levator scap  Has the pt failed non-conservative treatment? Yes/PT/TENs/ice/stretching   Radiology/Lab Test Reviewed: no new images to review  Lab Results   Component Value Date    WBC 8.7 12/17/2024    WBC 7.1 10/04/2024    WBC 8.1 01/24/2024     Lab Results   Component Value Date    HEMOGLOBIN 13.2 08/13/2015     Lab Results   Component Value Date    .0 12/17/2024    .0 10/04/2024    .0 01/24/2024           Patient Education: Patient was advised to continue normal activities as tolerated and was advised against any form of bedrest, since recent guidelines promote and encourage physical activity for improvment of functionality and overall pain.  (Family Practice Vol. 16, No. 1, 85-49Â© [employer] 1999 ), Patient was given brochure,website information, and handouts., Patient was shown interactive content, shown and explained procedure on spinal model..  Patient educated and verbalized understanding.  Medical Decision Making:   Diagnosis:    Encounter Diagnoses   Name Primary?    Chronic joint pain Yes    Arthritis of right hip     Cervical myofascial pain syndrome     Cervical spondylosis     Degeneration of cervical disc without myelopathy     Cervical radicular pain      Impression:   Patient is 69-year-old female who has  been chronic pain clinic patient for upwards of 10 years.  She has chronic cervicalgia and has been treated with cervical facet injections in the past however today she is reporting pain that is starting at the cervical spine radiating across the left shoulder to the left tricep region.  Her MRI is from 2021 that does show degenerative changes at C5-6 C6-7 with foraminal narrowing.  We did discuss updating her cervical MRI for consideration for cervical epidural steroid injection.  She continues her home exercises, TENS unit and stretching to help reduce symptoms however they are progressively getting worse.    She is also reporting myofascial pain in the left upper trapezius and levator scapula muscles.  She does have positive taut bands palpable on the left cervical paraspinals.  For this we did discuss in office trigger point injections which she would like to proceed with.  She should continue her TENS unit and she was also provided information about a cervical rest/traction device that she can use intermittently at home.    We also reviewed cervical positioning \"technECK\" where I was able to show her images of the increased pressure on her cervical spine while working on her computer or other job related tasks.  She will continue her home exercises strengthening and postural changes.    Lastly she is reporting increased right hip pain.  She does have a right hip x-ray in the system that does show osteoarthritis.  She does have a history of hip fracture in the past with screws.  She is having painful range of motion.  She has not had injection or treatment for this.  We did discuss her right intra-articular hip injection and she would like to proceed once insurance has authorized.    In the interim I did provide patient a prednisone taper to help reduce her current symptoms.    After review of chart, imaging, patient history and prior treatment we did develop the following plan through shared decision  making.  Plan:   > For right hip pain recommend right intra-articular hip injection: If no relief would refer patient to orthopedic for further evaluation and recommendations  > For left cervical myofascial pain we did discuss trigger point injections in the office: Order placed ending insurance authorization  > For left cervical radicular symptoms I did discuss updating her cervical imaging as her MRI is from 2021.  Based on findings would consider cervical epidural steroid injection as she has completed physical therapy and failed other conservative measures for her chronic neck pain that is progressively getting worse.    > Prednisone taper as prescribed  > Follow-up after all injection therapy and imaging to review and determine next treatment course  Orders Placed This Encounter   Procedures    [facility] PAIN NAVIGATOR    [facility] PAIN NAVIGATOR       Meds & Refills for this Visit:  Requested Prescriptions     Signed Prescriptions Disp Refills    predniSONE 10 MG Oral Tab 30 tablet 0     Sig: Take 4 tablets (40 mg total) by mouth daily for 3 days, THEN 3 tablets (30 mg total) daily for 3 days, THEN 2 tablets (20 mg total) daily for 3 days, THEN 1 tablet (10 mg total) daily for 3 days.       Imaging & Consults:  MRI SPINE CERVICAL (CPT=72141)  XR CERVICAL SPINE COMPLETE W/FLEX + EXT (CPT=72052)    The patient indicates understanding of these issues and agrees to the plan.      ID#[id number]             [1]   Current Outpatient Medications   Medication Sig Dispense Refill    LINZESS 72 MCG Oral Cap       NARATRIPTAN HCL 2.5 MG Oral Tab TAKE 1 TABLET (2.5 MG TOTAL) BY MOUTH AS NEEDED. 8 tablet 2    TIZANIDINE 2 MG Oral Tab TAKE 1 TABLET BY MOUTH 2 TIMES DAILY AS NEEDED. 180 tablet 0    REPATHA SURECLICK 140 MG/ML Subcutaneous Solution Auto-injector Inject 140 mg into the skin every 14 (fourteen) days.      amitriptyline 75 MG Oral Tab Take 1 tablet (75 mg total) by mouth nightly. 30 tablet 2    clonazePAM 0.5 MG Oral Tab Take 1-2  tablets (0.5-1 mg total) by mouth nightly as needed for Anxiety. 60 tablet 2    ALPRAZolam 0.5 MG Oral Tab Take 0.5-1 tablets (0.25-0.5 mg total) by mouth daily as needed for Anxiety. 30 tablet 2    budeson-glycopyrrol-formoterol (BREZTRI AEROSPHERE) 160-9-4.8 MCG/ACT Inhalation Aerosol Inhale 2 puffs into the lungs 2 (two) times daily. 10.7 g 5    fluticasone propionate 110 MCG/ACT Inhalation Aerosol Inhale 2 puffs into the lungs 2 (two) times daily. 1 each 1    albuterol (2.5 MG/3ML) 0.083% Inhalation Nebu Soln Take 3 mL (2.5 mg total) by nebulization every 4 (four) hours as needed for Wheezing. 1 each 3    aspirin-acetaminophen-caffeine 250-250-65 MG Oral Tab Take 1 tablet by mouth every 4 (four) hours as needed for Pain.      valACYclovir 500 MG Oral Tab       Cholecalciferol (VITAMIN D3) 250 MCG (28437 UT) Oral Cap Take 1 capsule by mouth daily. 3000 international unit      amLODIPine 5 MG Oral Tab Take 1 tablet (5 mg total) by mouth daily.      docusate sodium (DULCOLAX PINK STOOL SOFTENER) 100 MG Oral Cap Take 1 capsule (100 mg total) by mouth 2 (two) times daily. 30 capsule 0    dicyclomine 20 MG Oral Tab Take 1 tablet (20 mg total) by mouth 4 (four) times daily.      estradiol 1 MG Oral Tab       albuterol (PROAIR HFA) 108 (90 Base) MCG/ACT Inhalation Aero Soln Inhale 2 puffs into the lungs every 6 (six) hours as needed for Wheezing. 18 g 1    Esomeprazole Magnesium 20 MG Oral Capsule Delayed Release Take 2 capsules (40 mg total) by mouth nightly.      MULTIVITAMIN TAB/CAP Take 1 tablet by mouth daily.

## 2025-06-02 NOTE — PATIENT INSTRUCTIONS
Refill policies:    Allow 2-3 business days for refills; controlled substances may take longer.  Contact your pharmacy at least 5 days prior to running out of medication and have them send an electronic request or submit request through the “request refill” option in your [employer] account.  Refills are not addressed on weekends; covering physicians do not authorize routine medications on weekends.  No narcotics or controlled substances are refilled after noon on Fridays or by on call physicians.  By law, narcotics must be electronically prescribed.  A 30 day supply with no refills is the maximum allowed.  If your prescription is due for a refill, you may be due for a follow up appointment.  To best provide you care, patients receiving routine medications need to be seen at least once a year.  Patients receiving narcotic/controlled substance medications need to be seen at least once every 3 months.  In the event that your preferred pharmacy does not have the requested medication in stock (e.g. Backordered), it is your responsibility to find another pharmacy that has the requested medication available.  We will gladly send a new prescription to that pharmacy at your request.    Scheduling Tests:    If your physician has ordered radiology tests such as MRI or CT scans, please contact Central Scheduling at [phone] right away to schedule the test.  Once scheduled, the [facility] Centralized Referral Team will work with your insurance carrier to obtain pre-certification or prior authorization.  Depending on your insurance carrier, approval may take 3-10 days.  It is highly recommended patients assure they have received an authorization before having a test performed.  If test is done without insurance authorization, patient may be responsible for the entire amount billed.      Precertification and Prior Authorizations:  If your physician has recommended that you have a procedure or additional testing performed the [facility]  Centralized Referral Team will contact your insurance carrier to obtain pre-certification or prior authorization.    You are strongly encouraged to contact your insurance carrier to verify that your procedure/test has been approved and is a COVERED benefit.  Although the [facility] Centralized Referral Team does its due diligence, the insurance carrier gives the disclaimer that \"Although the procedure is authorized, this does not guarantee payment.\"    Ultimately the patient is responsible for payment.   Thank you for your understanding in this matter.  Paperwork Completion:  If you require FMLA or disability paperwork for your recovery, please make sure to either drop it off or have it faxed to our office at [phone]. Be sure the form has your name and date of birth on it.  The form will be faxed to our Forms Department and they will complete it for you.  There is a 25$ fee for all forms that need to be filled out.  Please be aware there is a 10-14 day turnaround time.  You will need to sign a release of information (TAIMKO) form if your paperwork does not come with one.  You may call the Forms Department with any questions at [phone].  Their fax number is [phone].

## 2025-06-09 ENCOUNTER — TELEPHONE (OUTPATIENT)
Dept: PAIN CLINIC | Facility: CLINIC | Age: 70
End: 2025-06-09

## 2025-06-09 DIAGNOSIS — M16.11 ARTHRITIS OF RIGHT HIP: Primary | ICD-10-CM

## 2025-06-09 NOTE — TELEPHONE ENCOUNTER
Prior authorization request completed for: left upper trapezius and left levator scapular TPI    Authorization # no auth needed   Pre-D: no  Exclusions/Restrictions: no  Covered Benefit: yes  Authorization dates: n/a  CPT codes approved: 20552  Number of visits/dates of service approved: 1  Physician: claire  Location: office  Call Ref#: [id number]  Representative Name: connor charlton   Insurance Carrier: [phone]    Patient can be scheduled. Routed to Navigator.

## 2025-06-09 NOTE — TELEPHONE ENCOUNTER
Prior authorization request completed for: right IA steriod hip injection   Authorization # no auth needed   Pre-D: no  Exclusions/Restrictions: no  Covered Benefit: yes  Authorization dates: n/a  CPT codes approved: 20610  Number of visits/dates of service approved: 1  Physician: claire  Location: office/out patient hospital   Call Ref#: [id number]  Representative Name: connor charlton   Insurance Carrier: [phone]    Patient can be scheduled. Routed to Navigator.

## 2025-06-10 ENCOUNTER — TELEPHONE (OUTPATIENT)
Dept: PAIN CLINIC | Facility: CLINIC | Age: 70
End: 2025-06-10

## 2025-06-10 NOTE — TELEPHONE ENCOUNTER
Patient would like to know if it is necessary for her to have an updated Cervical MRI? As she had one done  last year. Patient also mentioned that she contacted her insurance and she has a high out of pocket for MRI.     TE routed to RINA Crowe.

## 2025-06-10 NOTE — TELEPHONE ENCOUNTER
Patient advised of insurance approval to proceed with injections and is agreeable to scheduling. pre-procedure instructions reviewed. Patient prefers Local sedation. Reviewed sedation instructions including No Fasting & No  Required. Patient encouraged but not required to hold NSAIDs for 24 hours prior to procedure. Patient verbalized understanding of instructions, no further needs at this time.     [facility]IN [facility]INIC  PRE-PROCEDURE INSTRUCTIONS WITHOUT SEDATION    Procedure: Right IA Steroid Hip Injection      Appointment Date: 06/24/2025  Check-In Time: 12:00 PM      Prior to the procedure:  Please update us prior to the procedure if you are experiencing any symptoms of infection such as cough, fever, chills, urinary symptoms, or have recently been prescribed antibiotics, have open wounds, have recently had surgery or dental procedures.    Day of Procedure:  **Drivers will be required for patients who receive prescriptions for Valium.    NO FASTING REQUIRED  Please bring your Insurance Card, Photo ID, List of Current Medications and Referral (if applicable) to your appointment.  Please park in the [facility]on Garage and follow the signs to the [facility].  Check in at [facility] ([address] [place] [address]) outpatient registration in the [facility].  Please note-No prescriptions will be written by Pain Clinic in OR on the day of procedure. If you require a refill of medications, please contact the office 48 hours prior to your procedure.  If you have an implanted Spinal Cord or Peripheral Nerve Stimulator: Please remember to turn device off for procedure.        Medication Hold:    Number of days you need to be off for the following medications:    Aggrenox 10 days   Agrylin (Anagrelide) 10 days  Brilinta (Ticagrelor) 7 days  Imbruvica (Ibrutinib) 3 days   Enbrel (Etanercept) 24 hours   Fragmin (Dalteparin) 24 hours   Pletal (Cilostazol) 7 days  Effient (Prasugrel) 7  days  Pradaxa 10 days  Trental 7 days  Eliquis (Apixaban) 3 days  Xarelto (Rivaroxaban) 3 days  Lovenox (Enoxaparin) 24 hours  Aspirin  Greater than 81mg but less than 325mg   5 days  325mg and greater                  7 days  Coumadin       5 days  Procedure may be cancelled if INR is elevated.   Excedrin (with aspirin) 7 days  Plavix (Clopidogrel)                            7 days    NSAIDs: 24 hours preferred      Ibuprofen (Motrin, Advil, Vicoprofen), Naproxen (Naprosyn, Aleve), Piroxcam (Feldene), Meloxicam (Mobic), Oxaprozin (Daypro), Diclofenac (Voltaren), Indomethacin (Indocin), Etodolac (Lodine), Nabumetone (Relafen), Celebrex (Celecoxib)           HERBAL SUPPLEMENTS  5 days preferred  Fish oil, krill oil, Omega-3, Vascepa, Vitamin E, Turmeric, Garlic                       Insurance Authorization:   Most insurances are now requiring a preauthorization for all procedures.  In the event that your insurance does not authorize your procedure within 48 hours of the scheduled date, your procedure will be cancelled and rescheduled to a later date.  Please contact your insurance carrier to determine what your financial responsibility will be for the procedure(s).      Cancellation/Rescheduling Appointment:   In the event you need to cancel or reschedule your appointment, you must notify the office 24 hours prior.    Post-procedure instructions:        Please schedule a follow up visit within 2 to 4 weeks after your last procedure date   Please call our office with any questions or concerns before or after your procedure at  [phone].  If you are a diabetic, please increase the frequency of your glucose monitoring after the procedure as this may cause a temporary increase in your blood sugar.  Contact your primary care physician if your blood sugar rises as you may require some medication adjustment.  It is normal to have increased pain at injection site for up to 3-5 days after procedure, you can use heat or  ice (20 minutes on 20 minutes off) for comfort.    **To hear a recorded version of these instructions, please call [phone] and follow the prompts.  **Para escuchar las instrucciones en Español, por favor de llamar el hetal [phone] opción 4.

## 2025-06-10 NOTE — TELEPHONE ENCOUNTER
Contacted patient informed that insurance did not require pre certification w/insurance. Offered patient to schedule.     Scheduled patient for In Office -Left Upper Trapezius & Left Levator Scapular TPI on 06/13/25 @ 11:15 AM.

## 2025-06-12 ENCOUNTER — MED REC SCAN ONLY (OUTPATIENT)
Dept: FAMILY MEDICINE CLINIC | Facility: CLINIC | Age: 70
End: 2025-06-12

## 2025-06-13 ENCOUNTER — NURSE ONLY (OUTPATIENT)
Dept: PAIN CLINIC | Facility: CLINIC | Age: 70
End: 2025-06-13
Payer: MEDICARE

## 2025-06-13 ENCOUNTER — OFFICE VISIT (OUTPATIENT)
Dept: PAIN CLINIC | Facility: CLINIC | Age: 70
End: 2025-06-13
Payer: MEDICARE

## 2025-06-13 VITALS
OXYGEN SATURATION: 98 % | HEART RATE: 104 BPM | DIASTOLIC BLOOD PRESSURE: 60 MMHG | BODY MASS INDEX: 28 KG/M2 | SYSTOLIC BLOOD PRESSURE: 110 MMHG | WEIGHT: 175 LBS

## 2025-06-13 DIAGNOSIS — M79.18 CERVICAL MYOFASCIAL PAIN SYNDROME: Primary | ICD-10-CM

## 2025-06-13 PROCEDURE — 1160F RVW MEDS BY RX/DR IN RCRD: CPT | Performed by: ANESTHESIOLOGY

## 2025-06-13 PROCEDURE — 1159F MED LIST DOCD IN RCRD: CPT | Performed by: ANESTHESIOLOGY

## 2025-06-13 PROCEDURE — 20553 NJX 1/MLT TRIGGER POINTS 3/>: CPT | Performed by: ANESTHESIOLOGY

## 2025-06-13 RX ORDER — TRIAMCINOLONE ACETONIDE 40 MG/ML
40 INJECTION, SUSPENSION INTRA-ARTICULAR; INTRAMUSCULAR ONCE
Status: COMPLETED | OUTPATIENT
Start: 2025-06-13 | End: 2025-06-13

## 2025-06-13 RX ORDER — BUPIVACAINE HYDROCHLORIDE 5 MG/ML
9 INJECTION, SOLUTION EPIDURAL; INTRACAUDAL; PERINEURAL ONCE
Status: COMPLETED | OUTPATIENT
Start: 2025-06-13 | End: 2025-06-13

## 2025-06-13 NOTE — PROGRESS NOTES
Timeout completed prior to procedure @ 6399.  Participants present for timeout:  Dr. Reyes , RN Natalie , significant other and patient.

## 2025-06-13 NOTE — PATIENT INSTRUCTIONS
Refill policies:    Allow 2-3 business days for refills; controlled substances may take longer.  Contact your pharmacy at least 5 days prior to running out of medication and have them send an electronic request or submit request through the “request refill” option in your [employer] account.  Refills are not addressed on weekends; covering physicians do not authorize routine medications on weekends.  No narcotics or controlled substances are refilled after noon on Fridays or by on call physicians.  By law, narcotics must be electronically prescribed.  A 30 day supply with no refills is the maximum allowed.  If your prescription is due for a refill, you may be due for a follow up appointment.  To best provide you care, patients receiving routine medications need to be seen at least once a year.  Patients receiving narcotic/controlled substance medications need to be seen at least once every 3 months.  In the event that your preferred pharmacy does not have the requested medication in stock (e.g. Backordered), it is your responsibility to find another pharmacy that has the requested medication available.  We will gladly send a new prescription to that pharmacy at your request.    Scheduling Tests:    If your physician has ordered radiology tests such as MRI or CT scans, please contact Central Scheduling at [phone] right away to schedule the test.  Once scheduled, the [facility] Centralized Referral Team will work with your insurance carrier to obtain pre-certification or prior authorization.  Depending on your insurance carrier, approval may take 3-10 days.  It is highly recommended patients assure they have received an authorization before having a test performed.  If test is done without insurance authorization, patient may be responsible for the entire amount billed.      Precertification and Prior Authorizations:  If your physician has recommended that you have a procedure or additional testing performed the [facility]  Centralized Referral Team will contact your insurance carrier to obtain pre-certification or prior authorization.    You are strongly encouraged to contact your insurance carrier to verify that your procedure/test has been approved and is a COVERED benefit.  Although the [facility] Centralized Referral Team does its due diligence, the insurance carrier gives the disclaimer that \"Although the procedure is authorized, this does not guarantee payment.\"    Ultimately the patient is responsible for payment.   Thank you for your understanding in this matter.  Paperwork Completion:  If you require FMLA or disability paperwork for your recovery, please make sure to either drop it off or have it faxed to our office at [phone]. Be sure the form has your name and date of birth on it.  The form will be faxed to our Forms Department and they will complete it for you.  There is a 25$ fee for all forms that need to be filled out.  Please be aware there is a 10-14 day turnaround time.  You will need to sign a release of information (TAMIKO) form if your paperwork does not come with one.  You may call the Forms Department with any questions at [phone].  Their fax number is [phone].

## 2025-06-13 NOTE — PROCEDURES
Date of procedure: June 13, 2025    Preop diagnosis: Left cervical myofascial pain    Postop diagnosis: Same    Procedure: Left cervical trigger point injection    Surgeon: Nam Reyes    Anesthesia: None    EBL: 0    Indication: Patient is a 69-year-old female with a history of cervical myofascial pain    Procedure detail: Informed consent obtained.  Timeout done.  Skin overlying the cervical spine was prepped in usual sterile fashion.  Subsequently, a 27-gauge needle was used to deliver 40 mg triamcinolone with 9 cc of 0.5% bupivacaine into multiple trigger points.  Muscles injected include the levator scapulae, the trapezius, the rhomboid.  Patient tolerated procedure well.    Nam Reyes MD

## 2025-06-13 NOTE — PROGRESS NOTES
Patient presents in office today for Left Upper Trapezius & Left Levator Scapular TPI    Current pain level reported = 4/10    Last reported dose of Tizanidine and Amitriptyline       Narcotic Contract renewal n/a    Urine Drug screen n/a

## 2025-06-17 ENCOUNTER — PATIENT MESSAGE (OUTPATIENT)
Dept: PAIN CLINIC | Facility: CLINIC | Age: 70
End: 2025-06-17

## 2025-06-24 ENCOUNTER — APPOINTMENT (OUTPATIENT)
Dept: GENERAL RADIOLOGY | Facility: HOSPITAL | Age: 70
End: 2025-06-24
Attending: ANESTHESIOLOGY
Payer: MEDICARE

## 2025-06-24 ENCOUNTER — HOSPITAL ENCOUNTER (OUTPATIENT)
Facility: HOSPITAL | Age: 70
Setting detail: HOSPITAL OUTPATIENT SURGERY
Discharge: HOME | End: 2025-06-24
Attending: ANESTHESIOLOGY | Admitting: ANESTHESIOLOGY
Payer: MEDICARE

## 2025-06-24 VITALS
SYSTOLIC BLOOD PRESSURE: 150 MMHG | DIASTOLIC BLOOD PRESSURE: 82 MMHG | RESPIRATION RATE: 18 BRPM | OXYGEN SATURATION: 98 % | HEART RATE: 75 BPM | TEMPERATURE: 98 F

## 2025-06-24 PROBLEM — M25.551 PAIN OF RIGHT HIP: Status: ACTIVE | Noted: 2025-06-24

## 2025-06-24 PROCEDURE — 77002 NEEDLE LOCALIZATION BY XRAY: CPT | Performed by: ANESTHESIOLOGY

## 2025-06-24 PROCEDURE — 20610 DRAIN/INJ JOINT/BURSA W/O US: CPT | Performed by: ANESTHESIOLOGY

## 2025-06-24 RX ORDER — IOPAMIDOL 408 MG/ML
INJECTION, SOLUTION INTRATHECAL
Status: DISCONTINUED | OUTPATIENT
Start: 2025-06-24 | End: 2025-06-24

## 2025-06-24 RX ORDER — LIDOCAINE HYDROCHLORIDE 10 MG/ML
INJECTION, SOLUTION EPIDURAL; INFILTRATION; INTRACAUDAL; PERINEURAL
Status: DISCONTINUED | OUTPATIENT
Start: 2025-06-24 | End: 2025-06-24

## 2025-06-24 RX ORDER — METHYLPREDNISOLONE ACETATE 40 MG/ML
INJECTION, SUSPENSION INTRA-ARTICULAR; INTRALESIONAL; INTRAMUSCULAR; SOFT TISSUE
Status: DISCONTINUED | OUTPATIENT
Start: 2025-06-24 | End: 2025-06-24

## 2025-06-24 RX ORDER — NALOXONE HYDROCHLORIDE 0.4 MG/ML
0.08 INJECTION, SOLUTION INTRAMUSCULAR; INTRAVENOUS; SUBCUTANEOUS AS NEEDED
Status: DISCONTINUED | OUTPATIENT
Start: 2025-06-24 | End: 2025-06-24

## 2025-06-24 NOTE — DISCHARGE INSTRUCTIONS
Home Care Instructions Following Your Pain Procedure     Carl,  It has been a pleasure to have you as our patient. To help you at home, you must follow these general discharge instructions. We will review these with you before you are discharged. It is our hope that you have a complete and uneventful recovery from our procedure.     General Instructions:  What to Expect:  Bandages from your procedure today can be removed when you get home.  Please avoid soaking and/or swimming for 24 hours.  Showering is okay  It is normal to have increased pain symptoms and/or pain at injection site for up to 3-5 days after procedure, you can use heat or ice (20 minutes on 20 minutes off) for comfort.  You may experience some temporary side effects which may include restlessness or insomnia, flushing of the face, or heart palpitations.  Please contact the provider if these symptoms do not resolve within 3-4 days.  Lightheadedness or nausea may occur and should resolve within 24 to 48 hours.  If you develop a headache after treatment, rest, drink fluids (with caffeine, if possible) and take mild over-the-counter pain medication.  If the headache does not improve with the above treatment, contact the physician.  Home Medications:  Resume all previously prescribed medication.  Please avoid taking NSAIDs (Non-Steriodal Anti-Inflammatory Drugs) such as:  Ibuprofen ( Advil, Motrin) Aleve (Naproxen), Diclofenac, Meloxicam for 6 hours after procedure.   If you are on Coumadin (Warfarin) or any other anti-coagulant (or \"blood thinning\") medication such as Plavix (Clopidogrel), Xarelto (Rivaroxaban), Eliquis (Apixaban), Effient (Prasugrel) etc., restart on the following day from the procedure unless otherwise directed by your provider.  If you are a diabetic, please increase the frequency of your glucose monitoring after the procedure as steroids may cause a temporary (2-3 day) increase in your blood sugar.  Contact your primary care  physician if your blood sugar remains elevated as you may require some medication adjustment.  Diet:  Resume your regular diet as tolerated.  Activity:  We recommend that you relax and rest during the rest of your procedure day.  If you feel weakness in your arms or legs do not drive.  Follow-up Appointment  Please schedule a follow-up visit within 3 to 4 weeks after your last procedure date.  Question or Concerns:  Feel free to call our office with any questions or concerns at [phone] (option #2)    Carl  Thank you for coming to [facility] for your procedure.  The nurses try very hard to make sure you receive the best care possible.  Your trust in them as well as us is greatly appreciated.    Thanks so much,   Dr. Nam Reyes

## 2025-06-24 NOTE — H&P
History & Physical Examination    Patient Name: Carl Alaniz  MRN: [id number]  [facility]: [id number]  YOB: 1955    Pre-Operative Diagnosis:  Arthritis of right hip [M16.11]    Present Illness: Hip pain    ASA: 3  MP class: 1  Sedation: Local      Prescriptions Prior to Admission[1]  Current Hospital Medications[2]    Allergies: Allergies[3]    Past Medical History[4]  Past Surgical History[5]  Family History[6]  Social History     Tobacco Use    Smoking status: Never     Passive exposure: Never    Smokeless tobacco: Never    Tobacco comments:     20 yrs of second hand smoke, mother smoked   Substance Use Topics    Alcohol use: Yes     Alcohol/week: 0.0 - 1.0 standard drinks of alcohol     Comment: occasional       SYSTEM Check if Review is Normal Check if Physical Exam is Normal If not normal, please explain:   HEENT [x ] [x ]    NECK & BACK [x ] [x ]    HEART [x ] [x ]    LUNGS [x ] [x ]    ABDOMEN [x ] [x ]    UROGENITAL [x ] [x ]    EXTREMITIES [x ] [x ]    OTHER        [ x ] I have discussed the risks and benefits and alternatives with the patient/family.  They understand and agree to proceed with plan of care.  [ x ] I have reviewed the History and Physical done within the last 30 days.  Any changes noted above.    Nam Reyes MD              [1]   Facility-Administered Medications Prior to Admission   Medication Dose Route Frequency Provider Last Rate Last Admin    [COMPLETED] triamcinolone acetonide (Kenalog-40) 40 MG/ML injection 40 mg  40 mg Intramuscular Once Nam Reyes MD   40 mg at 06/13/25 1059    [COMPLETED] bupivacaine PF (Marcaine) 0.5% injection  9 mL Injection Once Nam Reyes MD   9 mL at 06/13/25 1059     Medications Prior to Admission   Medication Sig Dispense Refill Last Dose/Taking    ALPRAZOLAM 0.5 MG Oral Tab TAKE 0.5-1 TABLETS (0.25-0.5 MG TOTAL) BY MOUTH DAILY AS NEEDED FOR ANXIETY 30 tablet 0     methylPREDNISolone (MEDROL) 4 MG Oral Tablet Therapy Pack Take as  directed 21 each 0     [] predniSONE 10 MG Oral Tab Take 4 tablets (40 mg total) by mouth daily for 3 days, THEN 3 tablets (30 mg total) daily for 3 days, THEN 2 tablets (20 mg total) daily for 3 days, THEN 1 tablet (10 mg total) daily for 3 days. (Patient not taking: No sig reported) 30 tablet 0     LINZESS 72 MCG Oral Cap        NARATRIPTAN HCL 2.5 MG Oral Tab TAKE 1 TABLET (2.5 MG TOTAL) BY MOUTH AS NEEDED. 8 tablet 2     TIZANIDINE 2 MG Oral Tab TAKE 1 TABLET BY MOUTH 2 TIMES DAILY AS NEEDED. 180 tablet 0     REPATHA SURECLICK 140 MG/ML Subcutaneous Solution Auto-injector Inject 140 mg into the skin every 14 (fourteen) days.       amitriptyline 75 MG Oral Tab Take 1 tablet (75 mg total) by mouth nightly. 30 tablet 2     clonazePAM 0.5 MG Oral Tab Take 1-2 tablets (0.5-1 mg total) by mouth nightly as needed for Anxiety. 60 tablet 2     budeson-glycopyrrol-formoterol (BREZTRI AEROSPHERE) 160-9-4.8 MCG/ACT Inhalation Aerosol Inhale 2 puffs into the lungs 2 (two) times daily. 10.7 g 5     fluticasone propionate 110 MCG/ACT Inhalation Aerosol Inhale 2 puffs into the lungs 2 (two) times daily. 1 each 1     albuterol (2.5 MG/3ML) 0.083% Inhalation Nebu Soln Take 3 mL (2.5 mg total) by nebulization every 4 (four) hours as needed for Wheezing. 1 each 3     aspirin-acetaminophen-caffeine 250-250-65 MG Oral Tab Take 1 tablet by mouth every 4 (four) hours as needed for Pain.       valACYclovir 500 MG Oral Tab        Cholecalciferol (VITAMIN D3) 250 MCG (97147 UT) Oral Cap Take 1 capsule by mouth daily. 3000 international unit       amLODIPine 5 MG Oral Tab Take 1 tablet (5 mg total) by mouth daily.       docusate sodium (DULCOLAX PINK STOOL SOFTENER) 100 MG Oral Cap Take 1 capsule (100 mg total) by mouth 2 (two) times daily. 30 capsule 0     dicyclomine 20 MG Oral Tab Take 1 tablet (20 mg total) by mouth 4 (four) times daily.       estradiol 1 MG Oral Tab        albuterol (PROAIR HFA) 108 (90 Base) MCG/ACT Inhalation  Aero Soln Inhale 2 puffs into the lungs every 6 (six) hours as needed for Wheezing. 18 g 1     Esomeprazole Magnesium 20 MG Oral Capsule Delayed Release Take 2 capsules (40 mg total) by mouth nightly.       MULTIVITAMIN TAB/CAP Take 1 tablet by mouth daily.      [2]   No current facility-administered medications for this encounter.   [3]   Allergies  Allergen Reactions    Codeine ITCHING    Morphine NAUSEA AND VOMITING and ITCHING    Sulfa Antibiotics ITCHING    Statins MYALGIA    Amoxicillin-Pot Clavulanate      Other reaction(s): rash    Cefdinir DIARRHEA     Abdominal pain    Ciprofloxacin ITCHING    Citric Acid OTHER (SEE COMMENTS)    Clarithromycin NAUSEA ONLY     Nausea and abdominal cramping    Dairy Products OTHER (SEE COMMENTS)    Dust     Hydrocodone ITCHING    Lactose OTHER (SEE COMMENTS)    Other OTHER (SEE COMMENTS)    Pain Relief [Goodys Extra Strength] ITCHING     Any strong pain med, Norco, Oxycodone, etc.    Peppermint Flavor     Reglan [Metoclopramide] PALPITATIONS and OTHER (SEE COMMENTS)     Hypertension with Reglan administration via IV    Seasonal     Tomato OTHER (SEE COMMENTS)    Zomig [Zolmitriptan] ITCHING   [4]   Past Medical History:   Abnormal Holter monitor finding    rare PAC's and PVC's    Abnormal MRI, cervical spine    uncovertebral degenerative change is suggested, right-sided C2 C3, and left sided C4 C5 with disc bulging noted also at the C4 C5 level.  small central protrusions effacing the subarachnoid space C5 C6 and C6 C7    Allergic rhinitis    Arthritis    Asthma (HCC)    Chest pain    with exercises    Chronic fatigue syndrome    Colitis    sigmoid erythema consistent with nonspecific colitis    Decreased sexual desire    Depression    Depression with anxiety    psychiatry: Dr. Iva Nguyen    Diverticulosis    scattered, minimal    Endometriosis    s/p ablation therapy    Esophageal reflux    Essential hypertension    Fibromyalgia    History of colon polyps    GI:   Abisai [place]    History of femur fracture    s/p closed reduction percutaneous screw fixation of right femoral neck fracture  internal fixation of the right hip    HTN (hypertension)    Hyperlipidemia    IBD (inflammatory bowel disease)    IBS (irritable bowel syndrome)    chronic, recurrent. GI: Dr. Abisai Bay    Intervertebral disc protrusion    L5/S1 small central protrusion effacing the thecal sax with a small annular fissure in the posterior annular fibers    Migraines    neuro: Dr. Leo Burton    Nephrolithiasis    Osteopenia    Palpitations    likely anxiety related to dystrophic nails secondary to mechanical injury     Postmenopausal HRT (hormone replacement therapy)    Seasonal allergies    Vitamin D deficiency   [5]   Past Surgical History:  Procedure Laterality Date    Cholecystectomy  11/10    bile leak, sepsis, pancreatitis    Colonoscopy  10/16, 4/06, 9/00, 8/97 2000 polyps, 2006 no polyps    Colonoscopy  11/2019    Colonoscopy N/A 11/14/2019    Procedure: COLONOSCOPY, POSSIBLE BIOPSY, POSSIBLE POLYPECTOMY 07277;  Surgeon: Abisai Bay MD;  [place] [facility] [place] [facility]    Egd scope  2000, 3/18    normal, empiric dilation    Endometr ablate, thermal  2000    Ercp,diagnostic  2/11    Laparoscopy,lysis adhesns      endometirosis    Leg/ankle surgery proc unlisted  4/18/2012    closed reduction percutaneous screw fixation of right femoral neck fracture  internal fixation of the right hip    Other  April 2016    Cervical Facet injection, Ganglion block    Removal gallbladder      Tubal ligation     [6]   Family History  Problem Relation Age of Onset    Lung Disorder Mother         COPD, emphysema    Hypertension Mother     Pulmonary Disease Mother     Colon Cancer Maternal Grandmother         colon    Cancer Other         ovarian and breast    Breast Cancer Paternal Aunt 20

## 2025-06-24 NOTE — OPERATIVE REPORT
[facility]  Operative Report  2025     Carl Alaniz Patient Status:  Hospital Outpatient Surgery    1955 MRN [id number]   Location [facility] Attending Nam Reyes MD   Hosp Day # 0 PCP Olga Lidia Romero DO     Indication: Carl is a 69 year old female with hip pain    Preoperative Diagnosis:  Arthritis of right hip [M16.11]    Postoperative Diagnosis: Same as above.    Procedure performed: Right HIP JOINT INJECTION with local    Anesthesia: Local      EBL: Less than 1 ml.    Procedure Description:  After reviewing the patient’s history and performing a focused physical examination, the diagnosis was confirmed and contraindications such as infection and coagulopathy were ruled out.  Following review of potential side effects and complications, including but not necessarily limited to infection, allergic reaction, local tissue breakdown, nerve injury, and paresis, the patient indicated they understood and agreed to proceed.       The patient was brought to the procedure room and placed in supine position. After prepping and draping, the hip joint was identified with the help of fluoroscopy.  A 22-gauge 3.5-inch spinal needle was used to enter the joint after local infiltration with lidocaine.  Then 0.5 cc dye was injected and a nice hip arthrogram was revealed.  After that, 40 mg Depo-Medrol mixed with 4 cc 1% Lidocaine was injected. The patient tolerated the procedure very well. The patient had complete understanding of the risks and benefits of the procedure.      Complications: None.    Follow up:  Clinic     Nam Reyes MD

## 2025-06-25 ENCOUNTER — TELEPHONE (OUTPATIENT)
Dept: PAIN CLINIC | Facility: CLINIC | Age: 70
End: 2025-06-25

## 2025-06-25 NOTE — TELEPHONE ENCOUNTER
Wolf called placed to patient for post procedure follow up. Patient stated she is a little better but is very sore. Informed patient that soreness is to be expected after the procedure. Educated patient that it takes 3-5 days for the steroid to be effective and to allow adequate time for medication to work. Encouraged patient to alternate ice and heat and to take medications as prescribed. Pt verbalized understanding to call with any questions or concerns.      Procedure: Right HIP JOINT INJECTION with local   Date: 6/24/25  Follow up Visit Scheduled: None on file, she will call back to schedule

## 2025-06-26 ENCOUNTER — PATIENT MESSAGE (OUTPATIENT)
Dept: PAIN CLINIC | Facility: CLINIC | Age: 70
End: 2025-06-26

## 2025-06-26 NOTE — TELEPHONE ENCOUNTER
Pt was prescribed a MDP by Dr. Burton on 6/3/25. Pt also has prescriptions for tizanidine and clonazepam (not through this clinic).     MRI is scheduled on 7/21/25 and is linked to wait list.

## 2025-06-27 RX ORDER — GABAPENTIN 100 MG/1
100 CAPSULE ORAL 3 TIMES DAILY
Qty: 90 CAPSULE | Refills: 0 | Status: SHIPPED | OUTPATIENT
Start: 2025-06-27

## 2025-06-27 NOTE — TELEPHONE ENCOUNTER
Patient called stating that she is in a lot of pain. Advised that we received [email or web address] message and it was routed to provider.    Patient was wondering if a steroid pack can be called into pharmacy in the meantime [id number] - [place], [place] - [address] AT [facility], [phone], [phone].    Patient stated that if RN needs to speak with patient, ok to call her. If not, [employer] message is ok as well.

## 2025-06-30 ENCOUNTER — PATIENT MESSAGE (OUTPATIENT)
Dept: PAIN CLINIC | Facility: CLINIC | Age: 70
End: 2025-06-30

## 2025-06-30 RX ORDER — METHYLPREDNISOLONE 4 MG/1
TABLET ORAL
Qty: 1 EACH | Refills: 0 | Status: SHIPPED | OUTPATIENT
Start: 2025-06-30

## 2025-06-30 RX ORDER — METHYLPREDNISOLONE 4 MG/1
TABLET ORAL
Qty: 1 EACH | Refills: 0 | Status: SHIPPED | OUTPATIENT
Start: 2025-06-30 | End: 2025-06-30

## 2025-07-02 ENCOUNTER — HOSPITAL ENCOUNTER (OUTPATIENT)
Dept: GENERAL RADIOLOGY | Age: 70
Discharge: HOME | End: 2025-07-02
Attending: NURSE PRACTITIONER
Payer: MEDICARE

## 2025-07-02 DIAGNOSIS — M79.18 CERVICAL MYOFASCIAL PAIN SYNDROME: ICD-10-CM

## 2025-07-02 DIAGNOSIS — M54.12 CERVICAL RADICULAR PAIN: ICD-10-CM

## 2025-07-02 DIAGNOSIS — M50.30 DEGENERATION OF CERVICAL DISC WITHOUT MYELOPATHY: ICD-10-CM

## 2025-07-02 DIAGNOSIS — M47.812 CERVICAL SPONDYLOSIS: ICD-10-CM

## 2025-07-02 PROCEDURE — 72052 X-RAY EXAM NECK SPINE 6/>VWS: CPT | Performed by: NURSE PRACTITIONER

## 2025-07-09 DIAGNOSIS — G43.701 CHRONIC MIGRAINE WITHOUT AURA WITH STATUS MIGRAINOSUS, NOT INTRACTABLE: ICD-10-CM

## 2025-07-09 RX ORDER — NARATRIPTAN 2.5 MG/1
2.5 TABLET ORAL AS NEEDED
Qty: 8 TABLET | Refills: 2 | Status: SHIPPED | OUTPATIENT
Start: 2025-07-09

## 2025-07-09 NOTE — TELEPHONE ENCOUNTER
Medication: NARATRIPTAN HCL 2.5 MG Oral Tab      Date of last refill: 03/31/2025 (#8/2)  [place]     Last office visit: 12/09/2024  Due back to clinic per last office note:  Around 03/03/2025  Date next office visit scheduled:    Future Appointments   Date Time Provider Department Center   7/15/2025 11:00 AM Bekah Castellano APRN [facility]   10/1/2025  1:30 PM Iva Nguyen MD [facility]           Last OV note recommendation:    ASSESSMENT/PLAN:          ICD-10-CM     1. Chronic migraine without aura with status migrainosus, not intractable  G43.701         2. DDD (degenerative disc disease), cervical  M50.30 Physical Therapy Referral - [place] Location                       Chronic headache disorder mixed migraines and neck tension headache  Superimposed left temporal stabbing\" icepick\" headaches     CTA head was negative for acute abnormality.  CTA head and neck unremarkable there is mild tortuosity noted in the left distal carotid artery but then nothing definitive for FMD. No cerebral aneurysms     Patient had tried multiple preventive agents for migraines including CGRP monthly injections and Botox.    Start Botox therapy     Refer to PT for neck pain/stiffness        Leo Burton MD  [facility]

## 2025-07-11 ENCOUNTER — TELEPHONE (OUTPATIENT)
Dept: ORTHOPEDICS CLINIC | Facility: CLINIC | Age: 70
End: 2025-07-11

## 2025-07-11 DIAGNOSIS — M25.551 PAIN OF RIGHT HIP: Primary | ICD-10-CM

## 2025-07-15 ENCOUNTER — OFFICE VISIT (OUTPATIENT)
Facility: CLINIC | Age: 70
End: 2025-07-15
Payer: MEDICARE

## 2025-07-15 ENCOUNTER — TELEPHONE (OUTPATIENT)
Dept: PHYSICAL THERAPY | Age: 70
End: 2025-07-15

## 2025-07-15 ENCOUNTER — HOSPITAL ENCOUNTER (OUTPATIENT)
Dept: GENERAL RADIOLOGY | Age: 70
Discharge: HOME | End: 2025-07-15
Attending: STUDENT IN AN ORGANIZED HEALTH CARE EDUCATION/TRAINING PROGRAM
Payer: MEDICARE

## 2025-07-15 VITALS — HEIGHT: 66 IN | WEIGHT: 179 LBS | BODY MASS INDEX: 28.77 KG/M2

## 2025-07-15 DIAGNOSIS — M25.551 PAIN OF RIGHT HIP: ICD-10-CM

## 2025-07-15 DIAGNOSIS — M16.11 PRIMARY OSTEOARTHRITIS OF RIGHT HIP: Primary | ICD-10-CM

## 2025-07-15 DIAGNOSIS — S72.001S CLOSED FRACTURE OF NECK OF RIGHT FEMUR, SEQUELA: ICD-10-CM

## 2025-07-15 PROCEDURE — 1159F MED LIST DOCD IN RCRD: CPT | Performed by: STUDENT IN AN ORGANIZED HEALTH CARE EDUCATION/TRAINING PROGRAM

## 2025-07-15 PROCEDURE — 99203 OFFICE O/P NEW LOW 30 MIN: CPT | Performed by: STUDENT IN AN ORGANIZED HEALTH CARE EDUCATION/TRAINING PROGRAM

## 2025-07-15 PROCEDURE — 1160F RVW MEDS BY RX/DR IN RCRD: CPT | Performed by: STUDENT IN AN ORGANIZED HEALTH CARE EDUCATION/TRAINING PROGRAM

## 2025-07-15 PROCEDURE — 73502 X-RAY EXAM HIP UNI 2-3 VIEWS: CPT | Performed by: STUDENT IN AN ORGANIZED HEALTH CARE EDUCATION/TRAINING PROGRAM

## 2025-07-15 NOTE — PROGRESS NOTES
Orthopaedic Surgery  [address] [address] [address]   [phone]     Chief Complaint:  Right Hip Pain    History of Present Illness  Zak Alaniz is a 69 year old female with a history of hip fracture who presents with hip pain. She was referred by Dr. Reyes for further evaluation of her hip.    She has a history of a hip fracture in 2012 after slipping on the stairs, for which she underwent surgery at [facility] and initially recovered well. Over the past six months, she has experienced worsening pain in the hip, which is exacerbated by walking. The pain is described as a pressure that intensifies with continued walking, primarily located on the outside of the hip (lateral thigh), occasionally radiating to the knee. No numbness, tingling, or lower back pain is reported. No significant groin or posterior hip pain.    She received an injection in June, which provided only one day of relief. The pain is described as a shifting sensation, mostly on the outside of the hip, and she experiences difficulty laying on her side due to the pain.    During the review of symptoms, she reports that turning her hip out and walking are particularly painful, and she is unable to walk more than half a block without discomfort. She does not have trouble putting on socks and shoes, but turning her hip out is 'kind of ouchy'.     Her family history includes two uncles who have had both hips replaced, although her mother did not have similar issues.    She does not require any assistive devices.      PMH/PSH/Family History/Social History/Meds/Allergies:   Past Medical History[1]    Past Surgical History[2]    Family History[3]    Social History     Socioeconomic History    Marital status:      Spouse name: Not on file    Number of children: 0    Years of education: Not on file    Highest education level: Not on file   Occupational History    Occupation: on disability, former    Tobacco Use     Smoking status: Never     Passive exposure: Never    Smokeless tobacco: Never    Tobacco comments:     20 yrs of second hand smoke, mother smoked   Vaping Use    Vaping status: Never Used   Substance and Sexual Activity    Alcohol use: Yes     Alcohol/week: 0.0 - 1.0 standard drinks of alcohol     Comment: occasional    Drug use: Not on file     Comment: salve    Sexual activity: Not Currently     Partners: Male     Birth control/protection: Tubal Ligation     Comment: , together since 1994   Other Topics Concern     Service Not Asked    Blood Transfusions Not Asked    Caffeine Concern Yes     Comment: 1-1.5 cup of coffee in am    Occupational Exposure Not Asked    Hobby Hazards Not Asked    Sleep Concern Not Asked    Stress Concern Not Asked    Weight Concern Not Asked    Special Diet Not Asked    Back Care Not Asked    Exercise Yes     Comment: walking dog    Bike Helmet Not Asked    Seat Belt Not Asked    Self-Exams Not Asked   Social History Narrative    Not on file     Social Drivers of Health     Food Insecurity: No Food Insecurity (4/7/2025)    NCSS - Food Insecurity     Worried About Running Out of Food in the Last Year: No     Ran Out of Food in the Last Year: No   Transportation Needs: No Transportation Needs (4/7/2025)    NCSS - Transportation     Lack of Transportation: No   Stress: Not on file   Housing Stability: Not At Risk (4/7/2025)    NCSS - Housing/Utilities     Has Housing: Yes     Worried About Losing Housing: No     Unable to Get Utilities: No        Current Outpatient Medications   Medication Instructions    albuterol (PROAIR HFA) 108 (90 Base) MCG/ACT Inhalation Aero Soln 2 puffs, Inhalation, Every 6 hours PRN    albuterol (VENTOLIN) 2.5 mg, Nebulization, Every 4 hours PRN    ALPRAZolam (XANAX) 0.25-0.5 mg, Oral, DAILY PRN    amitriptyline (ELAVIL) 75 mg, Oral, Nightly    amLODIPine (NORVASC) 5 mg, Daily    aspirin-acetaminophen-caffeine 250-250-65 MG Oral Tab 1 tablet,  Every 4 hours PRN    budeson-glycopyrrol-formoterol (BREZTRI AEROSPHERE) 160-9-4.8 MCG/ACT Inhalation Aerosol 2 puffs, Inhalation, 2 times daily    Cholecalciferol (VITAMIN D3) 250 MCG (79248 UT) Oral Cap 1 capsule, Daily    clonazePAM (KLONOPIN) 0.5-1 mg, Oral, Nightly PRN    dicyclomine 20 MG Oral Tab 1 tablet, 4 times daily    docusate sodium (DULCOLAX PINK STOOL SOFTENER) 100 mg, Oral, 2 times daily    Esomeprazole Magnesium (NEXIUM) 40 mg, Nightly    estradiol 1 MG Oral Tab No dose, route, or frequency recorded.    fluticasone propionate 110 MCG/ACT Inhalation Aerosol 2 puffs, Inhalation, 2 times daily    gabapentin (NEURONTIN) 100 mg, Oral, 3 times daily    LINZESS 72 MCG Oral Cap     methylPREDNISolone (MEDROL) 4 MG Oral Tablet Therapy Pack Take as directed    methylPREDNISolone (MEDROL) 4 MG Oral Tablet Therapy Pack Take as directed    MULTIVITAMIN TAB/CAP 1 tablet, Daily    Naratriptan HCl (AMERGE) 2.5 mg, Oral, As needed    progesterone 100 MG Oral Cap Daily    Repatha SureClick 140 mg, Every 14 days    tiZANidine (ZANAFLEX) 2 mg, Oral, 2 times daily PRN    valACYclovir 500 MG Oral Tab        Allergies[4]      Physical Exam:   Vitals:    07/15/25 1327   Weight: 179 lb (81.2 kg)   Height: 5' 6\" (1.676 m)     Estimated body mass index is 28.89 kg/m² as calculated from the following:    Height as of this encounter: 5' 6\" (1.676 m).    Weight as of this encounter: 179 lb (81.2 kg).    Constitutional: No acute distress, well nourished.  Eyes: Anicteric sclera, pink conjunctiva  Ears, Nose, Mouth and Throat: Normocephalic, atraumatic, moist mucous membranes  Cardiovascular: No pitting edema or varicosities in the lower extremities  Respiratory: No respiratory distress, normal respiratory rhythm and effort   Neurological:  Oriented to person, place, and time.  Psychological:  Appropriate mood and affect.    Comprehensive Right Hip Exam:      Gait: Normal  Leg lengths: No obvious limb length  discrepancy  Inspection: No erythema, ecchymoses, or wounds. No rash. Well healed mini direct lateral incision at the proximal thigh  Palpation: No tenderness at the trochanteric bursa or IT band  ROM: Flexion: 110 degrees, limited ability to bring knee to chest  Internal Rotation: 0 degrees  External Rotation: 40 degrees  ROM causes pain?: Some groin pain with flexion and internal rotation  Strength: Intact 5/5 strength with IP, Quadriceps, TA, GS, and EHL  Sensation: Grossly intact to light touch over SPN/DPN/Tibial/Sural nerve distributions  Special tests: Stinchfield: not painful; Trendelenberg: Negative  Vasc: Warm perfused extremity      Imaging:   XR AP Pelvis and 2 views AP and Lat of the right Hip were personally reviewed and interpreted    There are moderate degenerative changes of the hip with loss of joint space, subchondral sclerosis, and osteophyte formation. S/p cannulated screw fixation of femoral neck fracture  No current fracture or dislocation noted        Assessment:     ICD-10-CM    1. Primary osteoarthritis of right hip  M16.11 OP REFERRAL TO [facility]      2. Closed fracture of neck of right femur, sequela  S72.001S              Plan:   Patient is a 69-year-old female who presents with right hip pain  She had a prior femoral neck fracture treated with closed reduction and percutaneous pinning  She appears to have moderate osteoarthritic changes on x-ray, similar to the left side  Her location of the pain is more lateral, though she has some groin pain that is reproduced with range of motion of the hip  Differential includes trochanteric bursitis and gluteal tendinopathy  She recently underwent an intra-articular hip injection which only provided 1 day of relief  Given that we would have to wait 3 months prior to surgical intervention to minimize risk of infection, I discussed with her that I would recommend starting with a course of physical therapy    If the lateral hip  pain is coming from soft tissue I anticipate that some of this will improve with physical therapy    Discussed follow-up in 6 to 8 weeks if no improvement and we can discuss conversion total hip arthroplasty      Thank you very much for allowing me to participate in the care of this patient. If you have any questions, please do not hesitate to contact me.      Esteban Sanford MD  Adult Hip and Knee Reconstruction    Department of Orthopaedic Surgery  [facility]     [address] [place] [address]  [address] [place] [address]     t: [phone]  f: [phone]       [facility]  The following individual(s) verbally consented to be recorded using ambient AI listening technology and understand that they can each withdraw their consent to this listening technology at any point by asking the clinician to turn off or pause the recording:    Patient name: Carl Alaniz    Bindu tool was used for dictation purposes only and the patient was not recorded at any point during the visit.         [1]   Past Medical History:   Abnormal Holter monitor finding    rare PAC's and PVC's    Abnormal MRI, cervical spine    uncovertebral degenerative change is suggested, right-sided C2 C3, and left sided C4 C5 with disc bulging noted also at the C4 C5 level.  small central protrusions effacing the subarachnoid space C5 C6 and C6 C7    Allergic rhinitis    Arthritis    Asthma (HCC)    Chest pain    with exercises    Chronic fatigue syndrome    Colitis    sigmoid erythema consistent with nonspecific colitis    Decreased sexual desire    Depression    Depression with anxiety    psychiatry: Dr. Iva Nguyen    Diverticulosis    scattered, minimal    Endometriosis    s/p ablation therapy    Esophageal reflux    Essential hypertension    Fibromyalgia    History of colon polyps    GI: Dr. Abisai Bay    History of femur fracture    s/p closed reduction percutaneous screw fixation of right femoral  neck fracture  internal fixation of the right hip    HTN (hypertension)    Hyperlipidemia    IBD (inflammatory bowel disease)    IBS (irritable bowel syndrome)    chronic, recurrent. GI: Dr. Abisai Bay    Intervertebral disc protrusion    L5/S1 small central protrusion effacing the thecal sax with a small annular fissure in the posterior annular fibers    Migraines    neuro: Dr. Leo Burton    Nephrolithiasis    Osteopenia    Palpitations    likely anxiety related to dystrophic nails secondary to mechanical injury     Postmenopausal HRT (hormone replacement therapy)    Seasonal allergies    Vitamin D deficiency   [2]   Past Surgical History:  Procedure Laterality Date    Cholecystectomy  11/10    bile leak, sepsis, pancreatitis    Colonoscopy  10/16, 4/06, 9/00, 8/97 2000 polyps, 2006 no polyps    Colonoscopy  11/2019    Colonoscopy N/A 11/14/2019    Procedure: COLONOSCOPY, POSSIBLE BIOPSY, POSSIBLE POLYPECTOMY [address]5440;  Surgeon: Abisai Bay MD;  [facility] [facility]    Egd scope  2000, 3/18    normal, empiric dilation    Endometr ablate, thermal  2000    Ercp,diagnostic  2/11    Laparoscopy,lysis adhesns      endometirosis    Leg/ankle surgery proc unlisted  4/18/2012    closed reduction percutaneous screw fixation of right femoral neck fracture  internal fixation of the right hip    Other  April 2016    Cervical Facet injection, Ganglion block    Removal gallbladder      Tubal ligation     [3]   Family History  Problem Relation Age of Onset    Lung Disorder Mother         COPD, emphysema    Hypertension Mother     Pulmonary Disease Mother     Colon Cancer Maternal Grandmother         colon    Cancer Other         ovarian and breast    Breast Cancer Paternal Aunt 20   [4]   Allergies  Allergen Reactions    Codeine ITCHING    Morphine NAUSEA AND VOMITING and ITCHING    Sulfa Antibiotics ITCHING    Statins MYALGIA    Amoxicillin-Pot Clavulanate      Other reaction(s): rash    Cefdinir  DIARRHEA     Abdominal pain    Ciprofloxacin ITCHING    Citric Acid OTHER (SEE COMMENTS)    Clarithromycin NAUSEA ONLY     Nausea and abdominal cramping    Dairy Products OTHER (SEE COMMENTS)    Dust     Hydrocodone ITCHING    Lactose OTHER (SEE COMMENTS)    Other OTHER (SEE COMMENTS)    Pain Relief [Goodys Extra Strength] ITCHING     Any strong pain med, Norco, Oxycodone, etc.    Peppermint Flavor     Reglan [Metoclopramide] PALPITATIONS and OTHER (SEE COMMENTS)     Hypertension with Reglan administration via IV    Seasonal     Tomato OTHER (SEE COMMENTS)    Zomig [Zolmitriptan] ITCHING

## 2025-07-17 ENCOUNTER — TELEPHONE (OUTPATIENT)
Dept: PHYSICAL THERAPY | Age: 70
End: 2025-07-17

## 2025-07-24 ENCOUNTER — TELEPHONE (OUTPATIENT)
Dept: PHYSICAL THERAPY | Age: 70
End: 2025-07-24

## (undated) DIAGNOSIS — G43.701 CHRONIC MIGRAINE WITHOUT AURA WITH STATUS MIGRAINOSUS, NOT INTRACTABLE: Primary | ICD-10-CM

## (undated) DIAGNOSIS — G43.701 CHRONIC MIGRAINE WITHOUT AURA WITH STATUS MIGRAINOSUS, NOT INTRACTABLE: ICD-10-CM

## (undated) DIAGNOSIS — M25.511 CHRONIC RIGHT SHOULDER PAIN: Primary | ICD-10-CM

## (undated) DIAGNOSIS — G89.29 CHRONIC RIGHT SHOULDER PAIN: Primary | ICD-10-CM

## (undated) DEVICE — BANDAGE,ADHESIVE,FABRIC,1"X3",STRL,LF: Brand: CURAD

## (undated) DEVICE — REMOVER LOT 4OZ NONIRRITATING UNSCNT SFT

## (undated) DEVICE — GLOVE SUR 7.5 SENSICARE PIP WHT PWD F

## (undated) DEVICE — GLOVE SURG SENSICARE SZ 7

## (undated) DEVICE — MARKER SKIN 2 TIP

## (undated) DEVICE — REMOVER DURAPREP 3M

## (undated) DEVICE — GLOVE SURG SENSICARE SZ 7-1/2

## (undated) DEVICE — PAIN TRAY: Brand: MEDLINE INDUSTRIES, INC.

## (undated) DEVICE — NEEDLE SPNL 22GA L3.5IN BLK QNCKE STYL DISP

## (undated) DEVICE — GLV SURG SZ 7.5 THK10MIL WHT ISOLEX SYN

## (undated) DEVICE — SKIN REG/FINE DUAL MARKER, RULER, LABELS: Brand: MEDLINE

## (undated) DEVICE — BANDAID COVERLET 1X3

## (undated) DEVICE — NEEDLE SPNL 25GA L3.5IN BLU HUB QNCKE BVL

## (undated) DEVICE — NEEDLE SPINAL 22X3-1/2 BLK

## (undated) DEVICE — BANDAGE ADH W1INXL3IN NAT FAB WVN N ADH PD

## (undated) DEVICE — REMOVER LOT 4OZ N IRRIG UNSCNT SFT MOIST LIQ

## (undated) DEVICE — GLOVE SUR 7 SENSICARE PIP WHT PWD F

## (undated) DEVICE — GLOVE SUR 6.5 SENSICARE PIP WHT PWD F

## (undated) DEVICE — GLOVE SURG SENSICARE SZ 6-1/2

## (undated) NOTE — MR AVS SNAPSHOT
[address], [place] [address] [phone]               Thank you for choosing us for your health care visit with Jeannie Hunter MD.  We are glad to serve you and happy to provide you with this ? No narcotics or controlled substances are refilled after noon on Fridays or by on call physicians. ? By law, narcotics cannot be faxed or phoned into your pharmacy.  The prescription must be signed by the provider, picked up in our office and physically approved and is a COVERED benefit. Although the [facility] staff does its due diligence, the insurance carrier gives the disclaimer that \"Although the procedure is authorized, this does not guarantee payment. \"    Ultimately the patient is responsible for payme Take 1 tablet by mouth daily.            Esomeprazole Magnesium 20 MG Cpdr   2 tablets daily   Commonly known as:  NEXIUM           EXCEDRIN MIGRAINE 250-250-65 MG Tabs   Generic drug:  aspirin-acetaminophen-caffeine   Take 1 tablet by mouth every 6 (six) h Visit [facility] online at  [email or web address]

## (undated) NOTE — MR AVS SNAPSHOT
[facility] [address] [facility]  [address] [facility]  [place] [address]  [phone]               Thank you for choosing us for your health care visit with Wilfredo Baldwin PA-C. We are glad to serve you and happy to provide you with this summary of your visit.   Ryley blocks the sinus opening. A buildup of fluid in the sinuses then causes pain and pressure. It can also encourage bacteria to grow in the sinuses.   Common symptoms of acute sinusitis  You may have:  · Facial soreness pain  · Headache  · Fever  · Fluid drain Hydrocodone Itching    Morphine Nausea and vomiting    Naproxen     Headaches      Naratriptan Itching    OK to take brand name AMERGE    Pain Relief [Goodys Extra Strength] Itching    Any strong pain med, Norco, Oxycodone, etc.    Reglan [Metoclopramide] EXCEDRIN MIGRAINE 250-250-65 MG Tabs   Generic drug:  aspirin-acetaminophen-caffeine   Take 1 tablet by mouth every 6 (six) hours as needed for Pain.            Fluticasone Propionate 50 MCG/ACT Susp   SPRAY TWICE IN EACH NOSTRIL QD   Commonly known as:  [email or web address]. If you've recently had a stay at the Hospital you can access your discharge instructions in [employer] by going to Visits < Admission Summaries.  If you've been to the Emergency Department or your doctor's office, you can view yo

## (undated) NOTE — MR AVS SNAPSHOT
Jonn Wilson [address] [address] [phone]               Thank you for choosing us for your health care visit with Paul Curry MD.  We are glad to serve you and happy to provide you with this Pain Relief [Goodys Extra Strength] Itching    Any strong pain med, Norco, Oxycodone, etc.    Seasonal     Tomato Other (See Comments)    Zomig [Zolmitriptan] Itching                Today's Vital Signs     BP Pulse Temp Weight          118/70 mmHg 90 98. 4 Fluticasone Propionate 50 MCG/ACT Susp   SPRAY TWICE IN EACH NOSTRIL QD   Commonly known as:  FLONASE           LINZESS 290 MCG Caps   Generic drug:  Linaclotide   Take 290 mcg by mouth daily. MULTIVITAMIN TAB/CAP   Take 1 tablet by mouth daily.

## (undated) NOTE — LETTER
ASTHMA ACTION PLAN for Vaibhav Sit     : 1955     Date: 2018  Provider:  Ruthie Wilde MD  Phone for doctor or clinic: [address] [phone]

## (undated) NOTE — LETTER
18        Tyrus Apley  [address] [place], [place] [id number]      Dear Joelle Solorio,    [address] [facility] records indicate that you have outstanding lab work and or testing that was ordered for you and has not yet been completed:          HCV AB for   thru 1

## (undated) NOTE — ED AVS SNAPSHOT
[facility] in [address] [facility]    Phone:  [phone]    Fax:  [phone]           Allen Mitchell   MRN: [id number]    Department:  [facility] in [facility]   Date of Visi Admin Date Administration Dose                   04/18/2017  22:06 LORazepam (ATIVAN) injection 0.5 mg 0.5 mg                     Medication Information       Follow the directions for taking your medications provided by your doctor.  Please ask your healt a substitute for ongoing medical care. Often, one Emergency Department visit does not uncover every injury or illness.  If you have been referred to a primary care or a specialist physician for a follow-up visit, please tell this physician (or your personal Song Saleem (Uyendevante Bardales) [phone] ([address]) [phone]                Additional Information       We are concerned for your overall well being:    - If you are a smoker or have smoked in the las

## (undated) NOTE — ED AVS SNAPSHOT
Migue Balderas   MRN: [id number]    Department:  [facility] Emergency Department   Date of Visit:  8/10/2019           Disclosure     Insurance plans vary and the physician(s) referred by the ER may not be covered by your plan.  Please contact y tell this physician (or your personal doctor if your instructions are to return to your personal doctor) about any new or lasting problems. The primary care or specialist physician will see patients referred from the [facility] Emergency Department.  Lisa Guzman

## (undated) NOTE — LETTER
09/11/19    Dear Sim Nunez,    We are contacting you from Dr. Alex Crystal office. Your health is important to us.   Therefore, we are sending this friendly reminder that you have the following overdue labs and/or tests which were ordered at your last office

## (undated) NOTE — MR AVS SNAPSHOT
Dmitri Wilson [address] [place] [address] [phone]               Thank you for choosing us for your health care visit with Leonidas Pettit MD.  We are glad to serve you and happy to provide you with this Hydrocodone Itching    Morphine Nausea and vomiting    Naproxen     Headaches      Naratriptan Itching    OK to take brand name AMERGE    Pain Relief [Goodys Extra Strength] Itching    Any strong pain med, Norco, Oxycodone, etc.    Reglan [Metoclopramide] Take 1 tablet by mouth every 6 (six) hours as needed for Pain.            Fluticasone Propionate 50 MCG/ACT Susp   SPRAY TWICE IN EACH NOSTRIL QD   Commonly known as:  FLONASE           LINZESS 290 MCG Caps   Generic drug:  Linaclotide   Take 290 mcg by rox If you've recently had a stay at the Hospital you can access your discharge instructions in [employer] by going to Visits < Admission Summaries.  If you've been to the Emergency Department or your doctor's office, you can view your past visit information in My

## (undated) NOTE — LETTER
Patient Name: Carl Alaniz        : 1955       Medical Record #: [id number]    CONSENT FOR PROCEDURES/SEDATION    Date: 2025       Time: 9:07 AM        1. I authorize the performance upon Carl Alaniz the following:    _LEFT UPPER TRAPEZIUS,LEFT LEVATOR SCAPULAR TRIGGER POINT INJECTION_    2. I authorize Dr. Reyes (and whomever is designated as the doctor’s assistant), to perform the above mentioned procedures.    3. If any unforeseen conditions arise during this procedure calling for additional procedures, operations, or medications (including anesthesia and blood transfusion), I  further request and authorize the doctor to do whatever he/she deems advisable in my interest.    4. I consent to the taking and reproduction of any photographs in the course of this procedure for professional purposes.    5. I consent to the administration of such sedation as may be considered necessary or advisable by the physician responsible for this service, with the exception of  _____________________________.    6. I have been informed by my doctor of the nature and purpose of this procedure/sedation, possible alternative methods of treatment, risk involved and possible complications.      Signature of Patient:  ___________________________    Signature of person authorized to consent for patient: Relationship to patient:  ___________________________    ___________________    Witness: ____________________     Date: ______________    Provider: ____________________     Date: ______________

## (undated) NOTE — ED AVS SNAPSHOT
[facility] in [address] [facility]    Phone:  [phone]    Fax:  [phone]           Luis Miguel Hernandez   MRN: [id number]    Department:  [facility] Emergency Department in [facility]   Date of Visi IF THERE IS ANY CHANGE OR WORSENING OF YOUR CONDITION, CALL YOUR PRIMARY CARE PHYSICIAN AT ONCE OR RETURN IMMEDIATELY TO THE EMERGENCY DEPARTMENT.     If you have been prescribed any medication(s), please fill your prescription right away and begin taking t

## (undated) NOTE — LETTER
7/3/2018        Victorino Cerrato  [address] [place] [address]          Dear Ailin De Leon,       Here are your results from your recent visit with Gastroenterology.      Your blood testing was negative for celiac disease: an allergy to gluten which is prim

## (undated) NOTE — MR AVS SNAPSHOT
[address], [address] [place] [phone]               Thank you for choosing us for your health care visit with Reynold Grewal MD.  We are glad to serve you and happy to provide you with this s  protocol for controlled substances:  Written prescriptions    ? Written prescriptions must be picked up in office. ? Please allow the office 48-72 hours to fill the prescription. ? Patient must present photo ID at time of .   If a designat Alc-Benzyl Alc-Sulfamethoxazole-Trimethoprim Rash    Amoxicillin-Pot Clavulanate     Other reaction(s): rash    Bactrim [Sulfamethoxazole W/Trimethoprim] Itching    Ciprofloxacin Itching    Citric Acid Other (See Comments)    Clarithromycin Nausea only Commonly known as:  FLONASE           LINZESS 290 MCG Caps   Generic drug:  Linaclotide   Take 290 mcg by mouth daily. MULTIVITAMIN TAB/CAP   Take 1 tablet by mouth daily.            Naratriptan HCl 2.5 MG Tabs   TAKE 1 TABLET BY MOUTH AT ONSET OF Eat plenty of low-fat dairy products High fat meats and dairy   Choose whole grain products Foods high in sodium   Water is best for hydration Fast food.    Eat at home when possible     Tips for increasing your physical activity – Adults who are physically

## (undated) NOTE — LETTER
07/10/21        Michelle Zee  [address] [place] [phone]      Dear Danika Rios,    [facility] records indicate that you have outstanding lab work and or testing that was ordered for you and has not yet been completed:  Orders Placed This Encounter

## (undated) NOTE — LETTER
07/06/17        Ines Left  [address] [place] [id number]      Dear [employer],    [address] [place] [address] records indicate that you have outstanding lab work and or testing that was ordered for you and has not yet been completed:          Vitamin D, 25-Hydroxy [E]

## (undated) NOTE — LETTER
ASTHMA ACTION PLAN for Dayami Fails     : 1955     Date: 10/17/2017  Provider:  Austen Blackburn MD  Phone for doctor or clinic: Ilene Tapia [address] [place] [phone]

## (undated) NOTE — MR AVS SNAPSHOT
[place] [id number]               Thank you for choosing us for your health care visit with Laureen Marino MD.  We are glad to serve you and happy to provide you with this ? Refills are not addressed on weekends; covering physicians do not authorize routine medications on weekends. ? No narcotics or controlled substances are refilled after noon on Fridays or by on call physicians.   ? By law, narcotics cannot be faxed or eugene approved and is a COVERED benefit. Although the [facility] staff does its due diligence, the insurance carrier gives the disclaimer that \"Although the procedure is authorized, this does not guarantee payment. \"    Ultimately the patient is responsible for payme cefdinir 300 MG Caps   Take 1 capsule (300 mg total) by mouth 2 (two) times daily. Commonly known as:  OMNICEF           ergocalciferol 49858 units Caps   Take 1 capsule (50,000 Units total) by mouth once a week.    Commonly known as:  DRISDOL/VITAMIN D2 If you've recently had a stay at the Hospital you can access your discharge instructions in retickr by going to Visits < Admission Summaries.  If you've been to the Emergency Department or your doctor's office, you can view your past visit information in My

## (undated) NOTE — Clinical Note
Dear Dr Morenita Milner,  Thank you for referring your patient to [address] [place] [address] Drive, [address]. We value our relationship with you and appreciate your confidence in our service and staff.    It is with great pleasure that we were able to provide treatment to [facility] AND [facility]